# Patient Record
Sex: FEMALE | Race: WHITE | NOT HISPANIC OR LATINO | Employment: OTHER | URBAN - METROPOLITAN AREA
[De-identification: names, ages, dates, MRNs, and addresses within clinical notes are randomized per-mention and may not be internally consistent; named-entity substitution may affect disease eponyms.]

---

## 2017-02-06 ENCOUNTER — APPOINTMENT (OUTPATIENT)
Dept: LAB | Facility: HOSPITAL | Age: 72
End: 2017-02-06

## 2017-02-06 ENCOUNTER — OFFICE VISIT (OUTPATIENT)
Dept: INTERNAL MEDICINE | Facility: CLINIC | Age: 72
End: 2017-02-06

## 2017-02-06 VITALS
WEIGHT: 194 LBS | SYSTOLIC BLOOD PRESSURE: 134 MMHG | HEIGHT: 65 IN | HEART RATE: 68 BPM | BODY MASS INDEX: 32.32 KG/M2 | RESPIRATION RATE: 16 BRPM | DIASTOLIC BLOOD PRESSURE: 70 MMHG | TEMPERATURE: 97.3 F | OXYGEN SATURATION: 97 %

## 2017-02-06 DIAGNOSIS — E78.49 OTHER HYPERLIPIDEMIA: Primary | ICD-10-CM

## 2017-02-06 DIAGNOSIS — Z00.00 PREVENTATIVE HEALTH CARE: ICD-10-CM

## 2017-02-06 DIAGNOSIS — E66.9 NON MORBID OBESITY, UNSPECIFIED OBESITY TYPE: ICD-10-CM

## 2017-02-06 DIAGNOSIS — M15.8 OTHER OSTEOARTHRITIS INVOLVING MULTIPLE JOINTS: ICD-10-CM

## 2017-02-06 DIAGNOSIS — R53.83 FATIGUE, UNSPECIFIED TYPE: ICD-10-CM

## 2017-02-06 DIAGNOSIS — E55.9 VITAMIN D DEFICIENCY: ICD-10-CM

## 2017-02-06 DIAGNOSIS — Z82.49 FAMILY HISTORY OF HEART DISEASE: ICD-10-CM

## 2017-02-06 PROBLEM — C54.9 MALIGNANT NEOPLASM OF BODY OF UTERUS (HCC): Status: ACTIVE | Noted: 2017-02-06

## 2017-02-06 PROBLEM — M15.9 OSTEOARTHRITIS OF MULTIPLE JOINTS: Status: ACTIVE | Noted: 2017-02-06

## 2017-02-06 PROBLEM — E78.5 HYPERLIPIDEMIA: Status: ACTIVE | Noted: 2017-02-06

## 2017-02-06 LAB
25(OH)D3 SERPL-MCNC: 14.3 NG/ML
ALBUMIN SERPL-MCNC: 4.6 G/DL (ref 3.2–4.8)
ALBUMIN/GLOB SERPL: 1.5 G/DL (ref 1.5–2.5)
ALP SERPL-CCNC: 106 U/L (ref 25–100)
ALT SERPL W P-5'-P-CCNC: 16 U/L (ref 7–40)
ANION GAP SERPL CALCULATED.3IONS-SCNC: 8 MMOL/L (ref 3–11)
ARTICHOKE IGE QN: 184 MG/DL (ref 0–130)
AST SERPL-CCNC: 25 U/L (ref 0–33)
BACTERIA UR QL AUTO: ABNORMAL /HPF
BASOPHILS # BLD AUTO: 0.02 10*3/MM3 (ref 0–0.2)
BASOPHILS NFR BLD AUTO: 0.3 % (ref 0–1)
BILIRUB SERPL-MCNC: 0.6 MG/DL (ref 0.3–1.2)
BILIRUB UR QL STRIP: ABNORMAL
BUN BLD-MCNC: 17 MG/DL (ref 9–23)
BUN/CREAT SERPL: 24.3 (ref 7–25)
CALCIUM SPEC-SCNC: 9.9 MG/DL (ref 8.7–10.4)
CHLORIDE SERPL-SCNC: 105 MMOL/L (ref 99–109)
CHOLEST SERPL-MCNC: 275 MG/DL (ref 0–200)
CLARITY UR: ABNORMAL
CO2 SERPL-SCNC: 29 MMOL/L (ref 20–31)
COLOR UR: YELLOW
CREAT BLD-MCNC: 0.7 MG/DL (ref 0.6–1.3)
CRP SERPL-MCNC: 2.3 MG/DL (ref 0–10)
DEPRECATED RDW RBC AUTO: 45.8 FL (ref 37–54)
EOSINOPHIL # BLD AUTO: 0.05 10*3/MM3 (ref 0.1–0.3)
EOSINOPHIL NFR BLD AUTO: 0.7 % (ref 0–3)
ERYTHROCYTE [DISTWIDTH] IN BLOOD BY AUTOMATED COUNT: 12.8 % (ref 11.3–14.5)
GFR SERPL CREATININE-BSD FRML MDRD: 82 ML/MIN/1.73
GLOBULIN UR ELPH-MCNC: 3.1 GM/DL
GLUCOSE BLD-MCNC: 82 MG/DL (ref 70–100)
GLUCOSE UR STRIP-MCNC: NEGATIVE MG/DL
HCT VFR BLD AUTO: 42.9 % (ref 34.5–44)
HDLC SERPL-MCNC: 67 MG/DL (ref 40–60)
HGB BLD-MCNC: 13.7 G/DL (ref 11.5–15.5)
HGB UR QL STRIP.AUTO: NEGATIVE
HYALINE CASTS UR QL AUTO: ABNORMAL /LPF
IMM GRANULOCYTES # BLD: 0.01 10*3/MM3 (ref 0–0.03)
IMM GRANULOCYTES NFR BLD: 0.1 % (ref 0–0.6)
KETONES UR QL STRIP: NEGATIVE
LEUKOCYTE ESTERASE UR QL STRIP.AUTO: ABNORMAL
LYMPHOCYTES # BLD AUTO: 2.05 10*3/MM3 (ref 0.6–4.8)
LYMPHOCYTES NFR BLD AUTO: 29.9 % (ref 24–44)
MCH RBC QN AUTO: 31.4 PG (ref 27–31)
MCHC RBC AUTO-ENTMCNC: 31.9 G/DL (ref 32–36)
MCV RBC AUTO: 98.2 FL (ref 80–99)
MONOCYTES # BLD AUTO: 0.48 10*3/MM3 (ref 0–1)
MONOCYTES NFR BLD AUTO: 7 % (ref 0–12)
NEUTROPHILS # BLD AUTO: 4.24 10*3/MM3 (ref 1.5–8.3)
NEUTROPHILS NFR BLD AUTO: 62 % (ref 41–71)
NITRITE UR QL STRIP: NEGATIVE
PH UR STRIP.AUTO: <=5 [PH] (ref 5–8)
PLATELET # BLD AUTO: 246 10*3/MM3 (ref 150–450)
PMV BLD AUTO: 10.4 FL (ref 6–12)
POTASSIUM BLD-SCNC: 4 MMOL/L (ref 3.5–5.5)
PROT SERPL-MCNC: 7.7 G/DL (ref 5.7–8.2)
PROT UR QL STRIP: NEGATIVE
RBC # BLD AUTO: 4.37 10*6/MM3 (ref 3.89–5.14)
RBC # UR: ABNORMAL /HPF
REF LAB TEST METHOD: ABNORMAL
SODIUM BLD-SCNC: 142 MMOL/L (ref 132–146)
SP GR UR STRIP: 1.02 (ref 1–1.03)
SQUAMOUS #/AREA URNS HPF: ABNORMAL /HPF
TRIGL SERPL-MCNC: 108 MG/DL (ref 0–150)
TSH SERPL DL<=0.05 MIU/L-ACNC: 1.04 MIU/ML (ref 0.35–5.35)
UROBILINOGEN UR QL STRIP: ABNORMAL
VIT B12 BLD-MCNC: 226 PG/ML (ref 211–911)
WBC NRBC COR # BLD: 6.85 10*3/MM3 (ref 3.5–10.8)
WBC UR QL AUTO: ABNORMAL /HPF

## 2017-02-06 PROCEDURE — 99204 OFFICE O/P NEW MOD 45 MIN: CPT | Performed by: NURSE PRACTITIONER

## 2017-02-06 PROCEDURE — 85025 COMPLETE CBC W/AUTO DIFF WBC: CPT | Performed by: NURSE PRACTITIONER

## 2017-02-06 PROCEDURE — 93000 ELECTROCARDIOGRAM COMPLETE: CPT | Performed by: INTERNAL MEDICINE

## 2017-02-06 PROCEDURE — 84443 ASSAY THYROID STIM HORMONE: CPT | Performed by: NURSE PRACTITIONER

## 2017-02-06 PROCEDURE — 36415 COLL VENOUS BLD VENIPUNCTURE: CPT | Performed by: NURSE PRACTITIONER

## 2017-02-06 PROCEDURE — 82607 VITAMIN B-12: CPT | Performed by: NURSE PRACTITIONER

## 2017-02-06 PROCEDURE — 80053 COMPREHEN METABOLIC PANEL: CPT | Performed by: NURSE PRACTITIONER

## 2017-02-06 PROCEDURE — 86140 C-REACTIVE PROTEIN: CPT | Performed by: NURSE PRACTITIONER

## 2017-02-06 PROCEDURE — 82306 VITAMIN D 25 HYDROXY: CPT | Performed by: NURSE PRACTITIONER

## 2017-02-06 PROCEDURE — 81001 URINALYSIS AUTO W/SCOPE: CPT | Performed by: NURSE PRACTITIONER

## 2017-02-06 PROCEDURE — 80061 LIPID PANEL: CPT | Performed by: NURSE PRACTITIONER

## 2017-02-06 RX ORDER — ACETAMINOPHEN 500 MG
500 TABLET ORAL EVERY 6 HOURS PRN
COMMUNITY
End: 2020-07-06

## 2017-02-06 RX ORDER — MULTIVIT WITH CALCIUM,IRON,MIN
1 TABLET ORAL DAILY
Qty: 30 TABLET | Refills: 11
Start: 2017-02-06 | End: 2017-11-13

## 2017-02-06 NOTE — PROGRESS NOTES
Subjective   Bushra Vaz is a 71 y.o. female.     Chief Complaint   Patient presents with   • Hyperlipidemia       History of Present Illness     Here to establish care. Last PCP Dr. Hunter in [place]    1. Uterine Cancer: Pt was diagnosed with uterine cancer in 2010. She has TRINA, BSO and appendectomy done in 2010, followed by 6 chemotherapy treatments. At 5 years post treatment, she was discharged by her oncologist. Her last pelvic was done about one year ago.    2. Hyperlipidemia: Pt has history of elevated cholesterol levels, but is not aware of the readings. She was treated with Atorvastatin last year. After 10 days of treatment, she stopped taking the Atorvastatin due to severe joint pain. She has no known CAD, but her father who was a non-smoker dies of MI at age 79.     3. Osteoarthritis: Pt has several year history of joint pain. She had bilateral knee replacement surgeries done in 2012 due to severe osteoarthritis in her knees. Currently she has mild left hip and left shoulder pains occasionally, which have not yet limited her daily functioning. She takes a Tylenol on occasion for pain management, especially when she plans to be more active.     4. Obesity: Pt's current BMI is 32. She reports no significant weight gain or loss for many years.     The following portions of the patient's history were reviewed and updated as appropriate: allergies, current medications, past family history, past medical history, past social history, past surgical history and problem list.    Active Ambulatory Problems     Diagnosis Date Noted   • Hyperlipidemia 02/06/2017   • Malignant neoplasm of body of uterus 02/06/2017   • Non morbid obesity 02/06/2017   • Osteoarthritis of multiple joints 02/06/2017   • Preventative health care 02/06/2017   • Fatigue 02/06/2017   • Vitamin D deficiency 02/06/2017     Resolved Ambulatory Problems     Diagnosis Date Noted   • No Resolved Ambulatory Problems     Past Medical History    Diagnosis Date   • Osteoarthritis    • Uterine cancer      Past Surgical History   Procedure Laterality Date   • Total abdominal hysterectomy with salpingo oophorectomy  2010     uterine cancer; 6 chemotherapy treatments   • Replacement total knee bilateral Bilateral 2012   • Cataract extraction, bilateral  2002   • Appendectomy  2010     during hysterectomy     Family History   Problem Relation Age of Onset   • Lymphoma Mother      non-hodgkins   • Heart attack Father    • Colon cancer Sister 55   • Stomach cancer Brother 78     non-smoker   • Diabetes Sister      diet controlled   • Breast cancer Other    • Breast cancer Other    • Stroke Neg Hx    • Thyroid disease Neg Hx      Social History     Social History   • Marital status:      Spouse name: N/A   • Number of children: N/A   • Years of education: N/A     Occupational History   • Retired      Social History Main Topics   • Smoking status: Former Smoker   • Smokeless tobacco: Not on file      Comment: smoked socially as young adult   • Alcohol use Yes      Comment: 2-3 drinks weekly   • Drug use: No   • Sexual activity: Not on file     Other Topics Concern   • Not on file     Social History Narrative    Domestic life : Lives with         Religious : Baptism        Sleep hygiene : 7-8 hours/night        Caffeine use : 1 serving of diet coke or tea daily        Exercise habits : Walks 30 minutes daily        Diet : Well-balanced, but loves sweets        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   HENT: Negative for congestion, ear pain, sinus pressure, sore throat and trouble swallowing.    Eyes: Negative for itching and visual disturbance.   Respiratory: Negative for cough and shortness of breath.    Cardiovascular: Negative  "for chest pain, palpitations and leg swelling.   Gastrointestinal: Negative for abdominal pain, blood in stool, constipation, diarrhea and nausea.   Endocrine: Negative for cold intolerance and heat intolerance.   Genitourinary: Negative for dysuria, hematuria, pelvic pain, vaginal bleeding and vaginal discharge.   Musculoskeletal: Positive for arthralgias (left hip and left shoulder). Negative for back pain, gait problem, myalgias and neck pain.   Skin: Negative for rash and wound.   Allergic/Immunologic: Negative for environmental allergies and food allergies.   Neurological: Negative for dizziness, seizures, syncope and headaches.   Hematological: Negative for adenopathy. Does not bruise/bleed easily.   Psychiatric/Behavioral: Positive for sleep disturbance (mild disturbance - wakes frequently). The patient is not nervous/anxious.        Objective   Blood pressure 134/70, pulse 68, temperature 97.3 °F (36.3 °C), temperature source Oral, resp. rate 16, height 65\" (165.1 cm), weight 194 lb (88 kg), SpO2 97 %.    Physical Exam   Constitutional: She is oriented to person, place, and time. She appears well-developed and well-nourished.   HENT:   Head: Normocephalic.   Right Ear: Tympanic membrane and ear canal normal.   Left Ear: Tympanic membrane and ear canal normal.   Nose: No mucosal edema or nasal deformity.   Mouth/Throat: Uvula is midline, oropharynx is clear and moist and mucous membranes are normal.   Eyes: Conjunctivae, EOM and lids are normal. Pupils are equal, round, and reactive to light.   Neck: Trachea normal and full passive range of motion without pain. Neck supple. Carotid bruit is not present. No thyroid mass and no thyromegaly present.   Cardiovascular: Normal rate, regular rhythm and normal heart sounds.    No murmur heard.  Pulses:       Radial pulses are 2+ on the right side, and 2+ on the left side.        Dorsalis pedis pulses are 2+ on the right side, and 2+ on the left side.        Posterior " tibial pulses are 2+ on the right side, and 2+ on the left side.   No edema   Pulmonary/Chest: Effort normal and breath sounds normal. She exhibits no mass and no tenderness. Right breast exhibits no mass. Left breast exhibits no mass.   Abdominal: Soft. Normal appearance and bowel sounds are normal. There is no hepatosplenomegaly. There is no tenderness.   Genitourinary: No breast tenderness.   Genitourinary Comments: deferred   Musculoskeletal: Normal range of motion. She exhibits no edema or tenderness.   No back pain with palpation   Neurological: She is alert and oriented to person, place, and time. She has normal strength and normal reflexes. No cranial nerve deficit or sensory deficit. She displays a negative Romberg sign.   Skin: Skin is warm and dry.   Psychiatric: She has a normal mood and affect. Her speech is normal and behavior is normal. Thought content normal. Cognition and memory are normal.   pleasant   Vitals reviewed.      ECG 12 Lead  Date/Time: 2/6/2017 3:21 PM  Performed by: ALEX CHERRY  Authorized by: ALEX CHERRY   Interpreted by ED physician: Alex Cherry M.D.  Comparison: not compared with previous ECG   Previous ECG: no previous ECG available  Rhythm: sinus rhythm  Rate: normal  BPM: 60  Conduction: conduction normal  ST Segments: ST segments normal  T Waves: T waves normal  QRS axis: normal  Other: no other findings  Clinical impression: normal ECG  Comments: Indication - family history heart disease    Electronically signed Alex Cherry M.D.2/12/2017 4:22 PM           Assessment/Plan   Bushra was seen today for hyperlipidemia.    Diagnoses and all orders for this visit:    Other hyperlipidemia  -     Lipid Panel  -     Comprehensive Metabolic Panel    Non morbid obesity, unspecified obesity type    Other osteoarthritis involving multiple joints    Fatigue, unspecified type  -     CBC & Differential  -     Urinalysis With Microscopic  -     Vitamin B12  -     TSH  -      C-reactive Protein  -     CBC Auto Differential  -     Urinalysis  -     Urinalysis, Microscopic Only    Preventative health care  -     Multiple Vitamins-Minerals (MULTIPLE VITAMINS/WOMENS) tablet; Take 1 tablet by mouth Daily.  -     aspirin 81 MG tablet; Take 1 tablet by mouth Daily.    Vitamin D deficiency  -     Vitamin D 25 Hydroxy      1. Uterine Cancer: Pt is 6 years post TRINA and BSO and chemotherapy for uterine cancer. She is having no vaginal problems. She was discharged from the oncologist's care last year. She is due for pelvic exam and will schedule this in the next month.     2. Hyperlipidemia: Pt has history of elevated cholesterol with intolerance of Atorvastatin. She has no known CAD, but her father did die at age 79 of MI. Will do LP today, then discuss treatment if needed. Her LDL goal is < 100 and ideally 70. She was counseled about ASA therapy and advised to start ASA 81 mg daily due to family history. Consider starting Co Q 10.     3. Osteoarthritis: Pt has done well with bilateral knee replacement. She has intermittent mild joint pain in her left hip and shoulder, which has not limited her activities to date. She may continue taking Tylenol as needed for pain.     4. Obesity: Pt had BMI of 32. Her overall health would likely benefit from 20# weight loss in the next year. Will discuss referral to dietitian at next visit. She was advised to continue walking at least 30 minutes daily. She was advised to start a daily multiple vitamin.    5. Pt is to obtain records of last colonoscopy, mammogram, DEXA, and vaccines. Will plan Wellness in 1 month. She is to schedule pelvic exam soon.    EKG done today.    TGF present @ this OX.    Patient Instructions   1. Start daily Women's Multiple Vitamin.    2. Start Aspirin 81 mg daily.    3. Obtain records from colonoscopy, mammogram, DEXA, primary care.    4. Vaccine due: Tdap, Pneumonvax, and Shingles.    5. Continue walking 30 minutes daily.    6.  Wellness due in one month. May schedule [facility] if desired.       Current Outpatient Prescriptions:   •  acetaminophen (TYLENOL) 500 MG tablet, Take 500 mg by mouth Every 6 (Six) Hours As Needed for mild pain (1-3) (pain)., Disp: , Rfl:   •  aspirin 81 MG tablet, Take 1 tablet by mouth Daily., Disp: , Rfl:   •  Multiple Vitamins-Minerals (MULTIPLE VITAMINS/WOMENS) tablet, Take 1 tablet by mouth Daily., Disp: 30 tablet, Rfl: 11    Allergies   Allergen Reactions   • Atorvastatin      Joint pains         There is no immunization history on file for this patient.        This visit was 50 minutes with 25 minutes spent reviewing past history and counseling pt on management of lipids, heart disease prevention, weight, exercise, medications.         Electronically signed by BENJI Phan 2/8/2017 7:19 AM

## 2017-02-08 ENCOUNTER — TELEPHONE (OUTPATIENT)
Dept: INTERNAL MEDICINE | Facility: CLINIC | Age: 72
End: 2017-02-08

## 2017-02-08 DIAGNOSIS — E53.8 VITAMIN B12 DEFICIENCY: ICD-10-CM

## 2017-02-08 DIAGNOSIS — E55.9 VITAMIN D DEFICIENCY: ICD-10-CM

## 2017-02-08 DIAGNOSIS — E78.49 OTHER HYPERLIPIDEMIA: Primary | ICD-10-CM

## 2017-02-08 RX ORDER — VITAMIN B COMPLEX
1 TABLET ORAL DAILY
Refills: 0
Start: 2017-02-08 | End: 2017-11-13

## 2017-02-08 RX ORDER — PRAVASTATIN SODIUM 10 MG
10 TABLET ORAL EVERY OTHER DAY
Qty: 30 TABLET | Refills: 0 | Status: SHIPPED | OUTPATIENT
Start: 2017-02-08 | End: 2017-03-20 | Stop reason: SDUPTHER

## 2017-02-08 RX ORDER — UBIDECARENONE 200 MG
200 CAPSULE ORAL DAILY
Start: 2017-02-08 | End: 2017-11-13

## 2017-02-08 NOTE — TELEPHONE ENCOUNTER
Pt notified of labs: , HDL 67, , FBG 82, Vit B12 226, Vit D 14.3, other labs OK. Pt is to start Co Q 10 200 mg BID, Vit D 5000 iu daily, Vit B12 2500 mcg SL daily, and Pravastatin 10 mg QOD. She was counseled about risks of low Vit D and Vit B12 levels. Plan to repeat LP, CMP, and Vit D in 1 month. Pt was requested to contact the office with any apparent side effects from the statin.

## 2017-02-12 PROBLEM — Z82.49 FAMILY HISTORY OF HEART DISEASE: Status: ACTIVE | Noted: 2017-02-12

## 2017-02-23 ENCOUNTER — OFFICE VISIT (OUTPATIENT)
Dept: INTERNAL MEDICINE | Facility: CLINIC | Age: 72
End: 2017-02-23

## 2017-02-23 VITALS
DIASTOLIC BLOOD PRESSURE: 70 MMHG | RESPIRATION RATE: 16 BRPM | OXYGEN SATURATION: 96 % | TEMPERATURE: 99 F | WEIGHT: 196 LBS | BODY MASS INDEX: 32.62 KG/M2 | HEART RATE: 76 BPM | SYSTOLIC BLOOD PRESSURE: 122 MMHG

## 2017-02-23 DIAGNOSIS — Z23 NEED FOR STREPTOCOCCUS PNEUMONIAE VACCINATION: ICD-10-CM

## 2017-02-23 DIAGNOSIS — Z12.31 VISIT FOR SCREENING MAMMOGRAM: ICD-10-CM

## 2017-02-23 DIAGNOSIS — N95.2 VAGINAL ATROPHY: Primary | ICD-10-CM

## 2017-02-23 PROCEDURE — G0009 ADMIN PNEUMOCOCCAL VACCINE: HCPCS | Performed by: NURSE PRACTITIONER

## 2017-02-23 PROCEDURE — 99213 OFFICE O/P EST LOW 20 MIN: CPT | Performed by: NURSE PRACTITIONER

## 2017-02-23 PROCEDURE — 90732 PPSV23 VACC 2 YRS+ SUBQ/IM: CPT | Performed by: NURSE PRACTITIONER

## 2017-02-23 NOTE — PATIENT INSTRUCTIONS
1. Pelvic and Pap today.    2. May use Vagisil or KY jelly for lubrication.    3. Pneumovax given today. Rx for Tdap vaccine given to pt - to have done at pharmacy.    4. Continue Women's daily multiple vitamin, Vitamin D 5000 iu daily, 2-3 serving of dairy daily and walking 30 minutes 5-7 days/week.    5. Referred for mammogram.    6. Plan to repeat DEXA in 2018.     7. Continue current meds.    8. Wellness and fasting labs March 17.

## 2017-02-23 NOTE — PROGRESS NOTES
Subjective   Bushra Vaz is a 71 y.o. female.     History of Present Illness     1. Pt here for pelvic exam. Last pelvic was done about one year ago. Pt has history of uterine cancer with TRINA, BSO and appendectomy done in 2010. She had 6 chemotherapy treatments and was discharged by her oncologist at 5 years post treatment. She denies any pelvic symptoms, other than dryness, currently. Last mammogram was 2/12/16.     2. Osteoporosis. Pt had DEXA done 2/12/16, which showed osteoporosis in L-spine and left hip with T score in each area of -2.5. She has history of taking Boniva for a short time about 5-10 years ago, but developed GI distress. Currently she does not want to take any other meds. She wants to continue taking her Women's multiple vitamin daily, Vitamin D 5000 iu daily. She takes in at least 2 servings of dairy daily and has started a regular walking program walking 30 minutes 5-6 days/week.     The following portions of the patient's history were reviewed and updated as appropriate: allergies, current medications, past family history, past medical history, past social history, past surgical history and problem list.    Review of Systems   Constitutional: Negative for fatigue and fever.   HENT: Negative for congestion and sore throat.    Eyes: Negative for pain and itching.   Respiratory: Negative for cough and shortness of breath.    Cardiovascular: Negative for chest pain, palpitations and leg swelling.   Gastrointestinal: Negative for abdominal pain, blood in stool, constipation, diarrhea and nausea.   Endocrine: Negative for cold intolerance and heat intolerance.   Genitourinary: Negative for dysuria, flank pain, hematuria, pelvic pain, vaginal bleeding, vaginal discharge and vaginal pain.   Musculoskeletal: Negative for arthralgias and back pain.   Skin: Negative for rash and wound.   Allergic/Immunologic: Negative for environmental allergies and food allergies.   Neurological: Negative for  dizziness and headaches.   Hematological: Negative for adenopathy. Does not bruise/bleed easily.   Psychiatric/Behavioral: Negative for sleep disturbance. The patient is not nervous/anxious.        Objective   Blood pressure 122/70, pulse 76, temperature 99 °F (37.2 °C), temperature source Oral, resp. rate 16, weight 196 lb (88.9 kg), SpO2 96 %.    Physical Exam   Constitutional: She is oriented to person, place, and time. She appears well-developed and well-nourished.   Cardiovascular: Normal rate, regular rhythm and normal heart sounds.    No murmur heard.  Pulses:       Radial pulses are 2+ on the right side, and 2+ on the left side.   Pulmonary/Chest: Effort normal and breath sounds normal. She has no wheezes. She exhibits no mass and no tenderness. Right breast exhibits no mass. Left breast exhibits no mass.   Abdominal: Soft. Normal appearance and bowel sounds are normal. There is no hepatosplenomegaly. There is no tenderness. Hernia confirmed negative in the right inguinal area and confirmed negative in the left inguinal area.   Genitourinary: Rectum normal and vagina normal. No breast tenderness. Pelvic exam was performed with patient supine. There is no rash, tenderness or lesion on the right labia. There is no rash, tenderness or lesion on the left labia.   Genitourinary Comments: Uterus, cervix, ovaries surgically absent. Pap test done on vaginal tissue.    Lymphadenopathy:        Right: No inguinal adenopathy present.        Left: No inguinal adenopathy present.   Neurological: She is alert and oriented to person, place, and time.   Skin: Skin is warm and dry.   Psychiatric: She has a normal mood and affect. Her behavior is normal.   Vitals reviewed.    Procedures  Assessment/Plan   Bushra was seen today for gynecologic exam.    Diagnoses and all orders for this visit:    Vaginal atrophy  -     Pap IG, Rfx HPV ASCU    Visit for screening mammogram  -     Mammo Screening Digital Tomosynthesis Bilateral With  CAD; Future    Need for Streptococcus pneumoniae vaccination  -     Pneumococcal Polysaccharide Vaccine 23-Valent Greater Than or Equal To 1yo Subcutaneous / IM    1. Pelvic exam was done today with Pap of vaginal wall. Pt has moderate vaginal dryness. Was counseled she may use Vagisil or KY jelly for lubrication.     2. Osteoporosis: Pt has borderline osteoporosis of L-spine and left hip per DEXA done on 2/12/16; T-score -2.5 both areas. She failed Boniva therapy due to GI distress. Declines any new medications at this time. She is to continue a daily women's multiple vitamin, 2-3 servings of dairy daily, Vitamin D 5000 iu daily, and walking at least 30 minutes 5-7 days/week. Plan to repeat DEXA in 2018.    3. Pt referred for annual mammogram screening. Clinical breast exam done today with no abnormality noted.     4. Pneumovax vaccine was given today. She was given Rx for Tdap vaccine. She declines flu vaccine today, but will plan to get the flu vaccine in the Fall.    Continue current meds.    Wellness scheduled March 17..    Patient Instructions   1. Pelvic and Pap today.    2. May use Vagisil or KY jelly for lubrication.    3. Pneumovax given today. Rx for Tdap vaccine given to pt - to have done at pharmacy.    4. Continue Women's daily multiple vitamin, Vitamin D 5000 iu daily, 2-3 serving of dairy daily and walking 30 minutes 5-7 days/week.    5. Referred for mammogram.    6. Plan to repeat DEXA in 2018.     7. Continue current meds.    8. Wellness and fasting labs March 17.      Electronically signed by BENJI Phan 2/24/2017 7:45 AM

## 2017-02-24 PROBLEM — M81.0 OSTEOPOROSIS: Status: ACTIVE | Noted: 2017-02-24

## 2017-02-27 LAB
CONV .: NORMAL
CYTOLOGIST CVX/VAG CYTO: NORMAL
CYTOLOGY CVX/VAG DOC THIN PREP: NORMAL
DX ICD CODE: NORMAL
HIV 1 & 2 AB SER-IMP: NORMAL
OTHER STN SPEC: NORMAL
PATH REPORT.FINAL DX SPEC: NORMAL
STAT OF ADQ CVX/VAG CYTO-IMP: NORMAL

## 2017-02-28 ENCOUNTER — TELEPHONE (OUTPATIENT)
Dept: INTERNAL MEDICINE | Facility: CLINIC | Age: 72
End: 2017-02-28

## 2017-03-16 ENCOUNTER — TELEPHONE (OUTPATIENT)
Dept: INTERNAL MEDICINE | Facility: CLINIC | Age: 72
End: 2017-03-16

## 2017-03-16 DIAGNOSIS — E55.9 VITAMIN D DEFICIENCY: ICD-10-CM

## 2017-03-16 DIAGNOSIS — E78.49 OTHER HYPERLIPIDEMIA: Primary | ICD-10-CM

## 2017-03-16 NOTE — TELEPHONE ENCOUNTER
Pt called requesting to have labs done prior to her visit at 10AM tomorrow. Order for LP, CMP, and Vit D placed.

## 2017-03-17 ENCOUNTER — OFFICE VISIT (OUTPATIENT)
Dept: INTERNAL MEDICINE | Facility: CLINIC | Age: 72
End: 2017-03-17

## 2017-03-17 ENCOUNTER — LAB (OUTPATIENT)
Dept: LAB | Facility: HOSPITAL | Age: 72
End: 2017-03-17

## 2017-03-17 VITALS
OXYGEN SATURATION: 98 % | WEIGHT: 196 LBS | TEMPERATURE: 98.4 F | HEART RATE: 68 BPM | DIASTOLIC BLOOD PRESSURE: 70 MMHG | BODY MASS INDEX: 32.65 KG/M2 | RESPIRATION RATE: 16 BRPM | HEIGHT: 65 IN | SYSTOLIC BLOOD PRESSURE: 138 MMHG

## 2017-03-17 DIAGNOSIS — K59.01 SLOW TRANSIT CONSTIPATION: Primary | ICD-10-CM

## 2017-03-17 DIAGNOSIS — E55.9 VITAMIN D DEFICIENCY: ICD-10-CM

## 2017-03-17 DIAGNOSIS — E78.49 OTHER HYPERLIPIDEMIA: ICD-10-CM

## 2017-03-17 DIAGNOSIS — Z00.00 PREVENTATIVE HEALTH CARE: ICD-10-CM

## 2017-03-17 LAB
25(OH)D3 SERPL-MCNC: 25.1 NG/ML
ALBUMIN SERPL-MCNC: 4.1 G/DL (ref 3.2–4.8)
ALBUMIN/GLOB SERPL: 1.3 G/DL (ref 1.5–2.5)
ALP SERPL-CCNC: 113 U/L (ref 25–100)
ALT SERPL W P-5'-P-CCNC: 14 U/L (ref 7–40)
ANION GAP SERPL CALCULATED.3IONS-SCNC: 1 MMOL/L (ref 3–11)
ARTICHOKE IGE QN: 159 MG/DL (ref 0–130)
AST SERPL-CCNC: 21 U/L (ref 0–33)
BILIRUB SERPL-MCNC: 0.5 MG/DL (ref 0.3–1.2)
BUN BLD-MCNC: 17 MG/DL (ref 9–23)
BUN/CREAT SERPL: 28.3 (ref 7–25)
CALCIUM SPEC-SCNC: 9.5 MG/DL (ref 8.7–10.4)
CHLORIDE SERPL-SCNC: 108 MMOL/L (ref 99–109)
CHOLEST SERPL-MCNC: 226 MG/DL (ref 0–200)
CO2 SERPL-SCNC: 32 MMOL/L (ref 20–31)
CREAT BLD-MCNC: 0.6 MG/DL (ref 0.6–1.3)
GFR SERPL CREATININE-BSD FRML MDRD: 98 ML/MIN/1.73
GLOBULIN UR ELPH-MCNC: 3.1 GM/DL
GLUCOSE BLD-MCNC: 92 MG/DL (ref 70–100)
HDLC SERPL-MCNC: 63 MG/DL (ref 40–60)
POTASSIUM BLD-SCNC: 4.1 MMOL/L (ref 3.5–5.5)
PROT SERPL-MCNC: 7.2 G/DL (ref 5.7–8.2)
SODIUM BLD-SCNC: 141 MMOL/L (ref 132–146)
TRIGL SERPL-MCNC: 110 MG/DL (ref 0–150)

## 2017-03-17 PROCEDURE — G0438 PPPS, INITIAL VISIT: HCPCS | Performed by: NURSE PRACTITIONER

## 2017-03-17 PROCEDURE — 80053 COMPREHEN METABOLIC PANEL: CPT | Performed by: NURSE PRACTITIONER

## 2017-03-17 PROCEDURE — 80061 LIPID PANEL: CPT | Performed by: NURSE PRACTITIONER

## 2017-03-17 PROCEDURE — 82306 VITAMIN D 25 HYDROXY: CPT | Performed by: NURSE PRACTITIONER

## 2017-03-17 PROCEDURE — 36415 COLL VENOUS BLD VENIPUNCTURE: CPT

## 2017-03-17 RX ORDER — CALCIUM POLYCARBOPHIL 625 MG 625 MG/1
625 TABLET ORAL DAILY
Start: 2017-03-17 | End: 2017-06-21 | Stop reason: SDDI

## 2017-03-17 NOTE — PROGRESS NOTES
QUICK REFERENCE INFORMATION:  The ABCs of the Annual Wellness Visit    Initial Medicare Wellness Visit    HEALTH RISK ASSESSMENT    1945    Recent Hospitalizations:  No recent hospitalization(s)..        Current Medical Providers:  Patient Care Team:  BENJI Cornejo as PCP - General (Internal Medicine)        Smoking Status:  History   Smoking Status   • Former Smoker   Smokeless Tobacco   • Not on file     Comment: smoked socially as young adult       Alcohol Consumption:  History   Alcohol Use   • Yes     Comment: 2-3 drinks weekly       Depression Screen:   No flowsheet data found.    Health Habits and Functional and Cognitive Screening:  No flowsheet data found.               Does the patient have evidence of cognitive impairment? No    Asprin use counseling:not applicable      Recent Lab Results:    Visual Acuity:  No exam data present    Age-appropriate Screening Schedule:  Refer to the list below for future screening recommendations based on patient's age, sex and/or medical conditions. Orders for these recommended tests are listed in the plan section. The patient has been provided with a written plan.    Health Maintenance   Topic Date Due   • TDAP/TD VACCINES (1 - Tdap) 09/29/1962   • INFLUENZA VACCINE  08/01/2016   • COLONOSCOPY  02/06/2017   • ZOSTER VACCINE  02/06/2017   • LIPID PANEL  02/06/2018   • MAMMOGRAM  02/12/2018   • DXA SCAN  02/12/2018   • PNEUMOCOCCAL VACCINES (65+ LOW/MEDIUM RISK) (2 of 2 - PCV13) 02/23/2018        Subjective   History of Present Illness    Bushra Vaz is a 71 y.o. female who presents for an Annual Wellness Visit.    The following portions of the patient's history were reviewed and updated as appropriate: allergies, current medications, past family history, past medical history, past social history, past surgical history and problem list.    Outpatient Medications Prior to Visit   Medication Sig Dispense Refill   • acetaminophen (TYLENOL) 500 MG tablet  Take 500 mg by mouth Every 6 (Six) Hours As Needed for mild pain (1-3) (pain).     • aspirin 81 MG tablet Take 1 tablet by mouth Daily.     • Cholecalciferol (VITAMIN D3) 5000 UNITS tablet Take 1 tablet by mouth Daily. 30 tablet    • Coenzyme Q10 200 MG capsule Take 200 mg by mouth Daily.     • Cyanocobalamin 2500 MCG sublingual tablet Place 1 tablet under the tongue Daily.  0   • Multiple Vitamins-Minerals (MULTIPLE VITAMINS/WOMENS) tablet Take 1 tablet by mouth Daily. 30 tablet 11   • pravastatin (PRAVACHOL) 10 MG tablet Take 1 tablet by mouth Every Other Day. 30 tablet 0     No facility-administered medications prior to visit.        Patient Active Problem List   Diagnosis   • Hyperlipidemia   • Malignant neoplasm of body of uterus   • Non morbid obesity   • Osteoarthritis of multiple joints   • Preventative health care   • Fatigue   • Vitamin D deficiency   • Vitamin B12 deficiency   • Family history of heart disease   • Vaginal atrophy   • Osteoporosis       Advanced Care Planning:  has NO advanced directive - information provided to the patient today    Identification of Risk Factors:  Risk factors include: weight , cardiovascular risk and increased fall risk.    Review of Systems   Constitutional: Negative for fatigue and fever.   HENT: Negative for congestion and sore throat.    Eyes: Negative for pain and itching.   Respiratory: Negative for cough and shortness of breath.    Cardiovascular: Negative for chest pain and palpitations.   Gastrointestinal: Negative for abdominal pain and nausea.   Endocrine: Negative for cold intolerance and heat intolerance.   Genitourinary: Negative for dysuria and hematuria.   Musculoskeletal: Positive for arthralgias (mild bilateral knee pain). Negative for back pain.   Skin: Negative for rash and wound.   Allergic/Immunologic: Negative for environmental allergies and food allergies.   Neurological: Negative for dizziness and headaches.   Hematological: Negative for  "adenopathy. Does not bruise/bleed easily.   Psychiatric/Behavioral: Negative for sleep disturbance. The patient is not nervous/anxious.        Compared to one year ago, the patient feels her physical health is the same.  Compared to one year ago, the patient feels her mental health is the same.    Objective     Physical Exam   Constitutional: She is oriented to person, place, and time. She appears well-developed and well-nourished.   Cardiovascular: Normal rate and regular rhythm.    Pulmonary/Chest: Effort normal.   Neurological: She is alert and oriented to person, place, and time.   Skin: Skin is warm and dry.   Psychiatric: She has a normal mood and affect. Her behavior is normal.   Vitals reviewed.      Vitals:    03/17/17 0957   BP: 138/70   BP Location: Right arm   Patient Position: Sitting   Pulse: 68   Resp: 16   Temp: 98.4 °F (36.9 °C)   TempSrc: Oral   SpO2: 98%   Weight: 196 lb (88.9 kg)   Height: 65\" (165.1 cm)       Body mass index is 32.62 kg/(m^2).  Discussed the patient's BMI with her. The BMI is above average; BMI management plan is completed.    Assessment/Plan   Patient Self-Management and Personalized Health Advice  The patient has been provided with information about: diet, exercise and weight management and preventive services including:   · Colorectal cancer screening, fecal occult blood test, Exercise counseling provided, Fall Risk assessment done, Glaucoma screening recommended, TdaP vaccine, Zostavax vaccine (Herpes Zoster).    Visit Diagnoses:  No diagnosis found.    No orders of the defined types were placed in this encounter.      Outpatient Encounter Prescriptions as of 3/17/2017   Medication Sig Dispense Refill   • acetaminophen (TYLENOL) 500 MG tablet Take 500 mg by mouth Every 6 (Six) Hours As Needed for mild pain (1-3) (pain).     • aspirin 81 MG tablet Take 1 tablet by mouth Daily.     • Cholecalciferol (VITAMIN D3) 5000 UNITS tablet Take 1 tablet by mouth Daily. 30 tablet    • " Coenzyme Q10 200 MG capsule Take 200 mg by mouth Daily.     • Cyanocobalamin 2500 MCG sublingual tablet Place 1 tablet under the tongue Daily.  0   • Multiple Vitamins-Minerals (MULTIPLE VITAMINS/WOMENS) tablet Take 1 tablet by mouth Daily. 30 tablet 11   • pravastatin (PRAVACHOL) 10 MG tablet Take 1 tablet by mouth Every Other Day. 30 tablet 0     No facility-administered encounter medications on file as of 3/17/2017.        Reviewed use of high risk medication in the elderly: not applicable  Reviewed for potential of harmful drug interactions in the elderly: not applicable    Follow Up:  No Follow-up on file.     An After Visit Summary and PPPS with all of these plans were given to the patient.       The wellness exam has been reviewed in detail.  The patient has been fully counseled on preventative guidelines for vaccines, cancer screenings, and other health maintenance needs.  Functional testing has been performed to assess capacity for independent living and need for other medical interventions.   The patient was counseled on maintaining a lifestyle to promote good health and to minimize chronic diseases.  The patient has been assisted with scheduling healthcare procedures for the coming year and given a written document outlining these recommendations.    Wellness forms scanned into EHR.    Electronically signed by BENJI Phan 3/17/2017 11:36 AM

## 2017-03-17 NOTE — PATIENT INSTRUCTIONS
1. Wellness done today.    2. Start FiberCon daily for constipation. Drink plenty of fluids. Continue daily walking. Take Miralax as needed for constipation.    3. Tdap vaccine due.    4. Rx for Shingles vaccine given to pt.    5. Eye exam and dental exam due.    6. Hemoccult due in 2-3 weeks.    7. Mammogram scheduled for March 21.    8. Will call pt with lab results done today.    9. Continue current meds.    10. Fasting exam in 3 months.

## 2017-03-20 ENCOUNTER — TELEPHONE (OUTPATIENT)
Dept: INTERNAL MEDICINE | Facility: CLINIC | Age: 72
End: 2017-03-20

## 2017-03-20 ENCOUNTER — HOSPITAL ENCOUNTER (OUTPATIENT)
Dept: MAMMOGRAPHY | Facility: HOSPITAL | Age: 72
Discharge: HOME | End: 2017-03-20
Admitting: NURSE PRACTITIONER

## 2017-03-20 ENCOUNTER — APPOINTMENT (OUTPATIENT)
Dept: OTHER | Facility: HOSPITAL | Age: 72
End: 2017-03-20

## 2017-03-20 DIAGNOSIS — E78.49 OTHER HYPERLIPIDEMIA: ICD-10-CM

## 2017-03-20 DIAGNOSIS — Z92.89 H/O MAMMOGRAM: ICD-10-CM

## 2017-03-20 DIAGNOSIS — Z12.31 VISIT FOR SCREENING MAMMOGRAM: ICD-10-CM

## 2017-03-20 DIAGNOSIS — E55.9 VITAMIN D DEFICIENCY: Primary | ICD-10-CM

## 2017-03-20 PROCEDURE — 77063 BREAST TOMOSYNTHESIS BI: CPT | Performed by: RADIOLOGY

## 2017-03-20 PROCEDURE — G0202 SCR MAMMO BI INCL CAD: HCPCS

## 2017-03-20 PROCEDURE — G0202 SCR MAMMO BI INCL CAD: HCPCS | Performed by: RADIOLOGY

## 2017-03-20 PROCEDURE — 77063 BREAST TOMOSYNTHESIS BI: CPT

## 2017-03-20 RX ORDER — ERGOCALCIFEROL 1.25 MG/1
50000 CAPSULE ORAL WEEKLY
Qty: 12 CAPSULE | Refills: 0 | Status: SHIPPED | OUTPATIENT
Start: 2017-03-20 | End: 2017-06-21

## 2017-03-20 RX ORDER — PRAVASTATIN SODIUM 20 MG
20 TABLET ORAL EVERY OTHER DAY
Qty: 45 TABLET | Refills: 0 | Status: SHIPPED | OUTPATIENT
Start: 2017-03-20 | End: 2017-06-21 | Stop reason: SINTOL

## 2017-03-20 NOTE — TELEPHONE ENCOUNTER
Pt notified of labs:  - down from 184 1 month ago - after taking Pravastatin 10 mg QOD; Vitamin D 25 - up from 15 after one month of Vit D 5000 iu daily. Pt was advised to switch to Simvastatin, but wants to stay on Pravastatin as she is tolerating it well. Will increase Pravastatin to 20 mg QOD, she should take Vit D 50,000 iu weekly for 12 weeks, and continue Vit D 5000 iu daily.  Plan to repeat LP, CMP & Vit D in 3 months.

## 2017-03-29 ENCOUNTER — TELEPHONE (OUTPATIENT)
Dept: INTERNAL MEDICINE | Facility: CLINIC | Age: 72
End: 2017-03-29

## 2017-06-21 ENCOUNTER — APPOINTMENT (OUTPATIENT)
Dept: LAB | Facility: HOSPITAL | Age: 72
End: 2017-06-21

## 2017-06-21 ENCOUNTER — OFFICE VISIT (OUTPATIENT)
Dept: INTERNAL MEDICINE | Facility: CLINIC | Age: 72
End: 2017-06-21

## 2017-06-21 VITALS
RESPIRATION RATE: 16 BRPM | DIASTOLIC BLOOD PRESSURE: 76 MMHG | OXYGEN SATURATION: 97 % | BODY MASS INDEX: 32.28 KG/M2 | WEIGHT: 194 LBS | SYSTOLIC BLOOD PRESSURE: 130 MMHG | TEMPERATURE: 98.1 F | HEART RATE: 68 BPM

## 2017-06-21 DIAGNOSIS — E78.49 OTHER HYPERLIPIDEMIA: Primary | ICD-10-CM

## 2017-06-21 DIAGNOSIS — E53.8 VITAMIN B12 DEFICIENCY: ICD-10-CM

## 2017-06-21 DIAGNOSIS — E55.9 VITAMIN D DEFICIENCY: ICD-10-CM

## 2017-06-21 LAB
25(OH)D3 SERPL-MCNC: 41.5 NG/ML
ALBUMIN SERPL-MCNC: 4.4 G/DL (ref 3.2–4.8)
ALBUMIN/GLOB SERPL: 1.4 G/DL (ref 1.5–2.5)
ALP SERPL-CCNC: 103 U/L (ref 25–100)
ALT SERPL W P-5'-P-CCNC: 16 U/L (ref 7–40)
ANION GAP SERPL CALCULATED.3IONS-SCNC: 8 MMOL/L (ref 3–11)
ARTICHOKE IGE QN: 180 MG/DL (ref 0–130)
AST SERPL-CCNC: 24 U/L (ref 0–33)
BILIRUB SERPL-MCNC: 0.6 MG/DL (ref 0.3–1.2)
BUN BLD-MCNC: 18 MG/DL (ref 9–23)
BUN/CREAT SERPL: 30 (ref 7–25)
CALCIUM SPEC-SCNC: 9.9 MG/DL (ref 8.7–10.4)
CHLORIDE SERPL-SCNC: 109 MMOL/L (ref 99–109)
CHOLEST SERPL-MCNC: 257 MG/DL (ref 0–200)
CK SERPL-CCNC: 73 U/L (ref 26–174)
CO2 SERPL-SCNC: 26 MMOL/L (ref 20–31)
CREAT BLD-MCNC: 0.6 MG/DL (ref 0.6–1.3)
GFR SERPL CREATININE-BSD FRML MDRD: 98 ML/MIN/1.73
GLOBULIN UR ELPH-MCNC: 3.1 GM/DL
GLUCOSE BLD-MCNC: 96 MG/DL (ref 70–100)
HDLC SERPL-MCNC: 60 MG/DL (ref 40–60)
POTASSIUM BLD-SCNC: 4.4 MMOL/L (ref 3.5–5.5)
PROT SERPL-MCNC: 7.5 G/DL (ref 5.7–8.2)
SODIUM BLD-SCNC: 143 MMOL/L (ref 132–146)
TRIGL SERPL-MCNC: 113 MG/DL (ref 0–150)
VIT B12 BLD-MCNC: 307 PG/ML (ref 211–911)

## 2017-06-21 PROCEDURE — 82550 ASSAY OF CK (CPK): CPT | Performed by: NURSE PRACTITIONER

## 2017-06-21 PROCEDURE — 82306 VITAMIN D 25 HYDROXY: CPT | Performed by: NURSE PRACTITIONER

## 2017-06-21 PROCEDURE — 36415 COLL VENOUS BLD VENIPUNCTURE: CPT | Performed by: NURSE PRACTITIONER

## 2017-06-21 PROCEDURE — 99214 OFFICE O/P EST MOD 30 MIN: CPT | Performed by: NURSE PRACTITIONER

## 2017-06-21 PROCEDURE — 82607 VITAMIN B-12: CPT | Performed by: NURSE PRACTITIONER

## 2017-06-21 PROCEDURE — 80053 COMPREHEN METABOLIC PANEL: CPT | Performed by: NURSE PRACTITIONER

## 2017-06-21 PROCEDURE — 80061 LIPID PANEL: CPT | Performed by: NURSE PRACTITIONER

## 2017-06-21 NOTE — PATIENT INSTRUCTIONS
1. Labs today to check cholesterol, Vitamin D, and Vitamin B12.    2. Goal LDL < 100 and ideally 70 in light of father's history of heart disease. Resume Co Q 10 200 mg daily.     3. Tdap vaccine and Shingles (Zoster) vaccine due - to be done at pharmacy.    4. Walk at least 30 minutes daily.     5. Continue current meds.    6. Fasting f/u in 3 months.

## 2017-06-21 NOTE — PROGRESS NOTES
Subjective   Bushra Vaz is a 71 y.o. female.     History of Present Illness     1. Hyperlipidemia: Pt has history of elevated LDL of 184. After one month of Pravastatin 10 mg QOD, her level dropped to 159. She was advised to switch to Simvastatin at that time, but asked to stay on Pravastatin as she was tolerating it well. Her dose was increased to Pravastatin 20 mg QOD in March. Today she reports she developed bilateral ankle pain, which improved after she experimented with holding the Pravastatin. Ankle pain resolved each time she help the Pravastatin. She ultimately stopped taking Pravastatin about 2 months ago, with no ankle pain since. She has not been taking Co Q 10.     2. Vitamin D Deficiency: Pt had severely low Vitamin D level of 14 in February. She was prescribed Ergocalciferol 50,000 iu weekly for 12 weeks, in addition to Vitamin D 5000 iu daily. Today she reports she completed the weekly Vitamin D, but has been taking Vitamin D 5000 iu only 2-3 times/week.    3. Vitamin B12 Deficiency: Pt's Vitamin B12 level was 226 in February, at which time she was instructed to start Vitamin B12 SL 2500 mcg daily. She has been taking Vitamin B12 2500 mcg orally ( not SL) almost daily for the past 3 months.     4. Overweight: Pt's current BMI is 32.6. She has not been consistent with an exercise program this Spring. She eats a well-balanced diet, but admits to eating too many sweets.     The following portions of the patient's history were reviewed and updated as appropriate: allergies, current medications, past family history, past medical history, past social history, past surgical history and problem list.    Review of Systems   Constitutional: Negative for fatigue and fever.   HENT: Negative for congestion, ear pain and sore throat.    Eyes: Negative for pain and itching.   Respiratory: Negative for cough and shortness of breath.    Cardiovascular: Negative for chest pain, palpitations and leg swelling.    Gastrointestinal: Negative for abdominal pain and nausea.   Endocrine: Negative for cold intolerance and heat intolerance.   Genitourinary: Negative for dysuria and hematuria.   Musculoskeletal: Negative for arthralgias and back pain.   Skin: Negative for rash and wound.   Allergic/Immunologic: Negative for environmental allergies and food allergies.   Neurological: Negative for dizziness and headaches.   Hematological: Negative for adenopathy. Does not bruise/bleed easily.   Psychiatric/Behavioral: Negative for sleep disturbance. The patient is not nervous/anxious.        Objective   Blood pressure 130/76, pulse 68, temperature 98.1 °F (36.7 °C), temperature source Oral, resp. rate 16, weight 194 lb (88 kg), SpO2 97 %.    Physical Exam   Constitutional: She is oriented to person, place, and time. She appears well-developed and well-nourished.   HENT:   Right Ear: Tympanic membrane and ear canal normal.   Left Ear: Tympanic membrane and ear canal normal.   Mouth/Throat: Oropharynx is clear and moist and mucous membranes are normal.   Cardiovascular: Normal rate, regular rhythm and normal heart sounds.    No murmur heard.  No edema   Pulmonary/Chest: Effort normal and breath sounds normal.   Abdominal: Soft. Normal appearance and bowel sounds are normal. There is no hepatosplenomegaly. There is no tenderness.   Neurological: She is alert and oriented to person, place, and time.   Skin: Skin is warm and dry.   Psychiatric: She has a normal mood and affect. Her behavior is normal.   Vitals reviewed.    Procedures  Assessment/Plan   Bushra was seen today for hyperlipidemia.    Diagnoses and all orders for this visit:    Other hyperlipidemia  -     Lipid Panel  -     Comprehensive Metabolic Panel  -     CK    Vitamin B12 deficiency  -     Vitamin B12    Vitamin D deficiency  -     Vitamin D 25 Hydroxy      1. Hyperlipidemia: Pt's LDL is moderately elevated. She has no known CAD, but her father, who was non-smoker had  fatal MI at age 79.   She has not tolerated Pravastatin QOD or Atorvastatin due to joint pains. She was instructed to start Co Q 10 200 mg daily. Will do LP today, then reassess treatment. LDL goal < 100 and ideally 70.    2. Vitamin D Deficiency: Will repeat Vitamin D level today. Pt was counseled about risks of low Vitamin D level. She was instructed to take Vitamin D 5000 iu daily until further notice.    3. Vitamin B12 Deficiency: Will repeat Vitamin B12 today. Pt has been taking oral rather than SL Vitamin B12 2500 mcg daily. Will reassess dosing after labs complete. She was counseled about risks of low Vitamin B12 level.    4. Overweight: Current BMI is 32.6. Pt was instructed to walk daily for at lest 30 minutes. She is not interested in working with dietitian at this time. She would benefit from 15-20# weight loss over the next year.     She was counseled about risks and benefits of both Tdap and Shingles vaccines.     Continue other meds.    Fasting f/u in 3 months.    Patient Instructions   1. Labs today to check cholesterol, Vitamin D, and Vitamin B12.    2. Goal LDL < 100 and ideally 70 in light of father's history of heart disease. Resume Co Q 10 200 mg daily.     3. Tdap vaccine and Shingles (Zoster) vaccine due - to be done at pharmacy.    4. Walk at least 30 minutes daily.     5. Continue current meds.    6. Fasting f/u in 3 months.          Electronically signed by BENJI Phan 6/21/2017 1:20 PM

## 2017-06-22 ENCOUNTER — TELEPHONE (OUTPATIENT)
Dept: INTERNAL MEDICINE | Facility: CLINIC | Age: 72
End: 2017-06-22

## 2017-06-22 NOTE — TELEPHONE ENCOUNTER
Pt notified of labs: Vit D 41.5, B12 307 - better, but still borderline low; ; FBG 96; CK OK. Pt instructed to continue Vit B12 2500 mcg daily, but switch to SL; continue Vitamin D 5000 iu daily. LDL not at goal of < 100, but due to recent intolerance of statins, will hold statin for another 3 months, then retest LP. Pt was offered, but declined, referral to dietitian, wanting to tighten up on her diet. She was advised to eat heart healthy diet.

## 2017-11-10 ENCOUNTER — APPOINTMENT (OUTPATIENT)
Dept: GENERAL RADIOLOGY | Facility: HOSPITAL | Age: 72
End: 2017-11-10

## 2017-11-10 ENCOUNTER — HOSPITAL ENCOUNTER (EMERGENCY)
Facility: HOSPITAL | Age: 72
Discharge: HOME | End: 2017-11-10
Attending: EMERGENCY MEDICINE | Admitting: EMERGENCY MEDICINE

## 2017-11-10 VITALS
DIASTOLIC BLOOD PRESSURE: 66 MMHG | WEIGHT: 200 LBS | HEIGHT: 65 IN | SYSTOLIC BLOOD PRESSURE: 149 MMHG | BODY MASS INDEX: 33.32 KG/M2 | TEMPERATURE: 98.5 F | HEART RATE: 74 BPM | RESPIRATION RATE: 17 BRPM | OXYGEN SATURATION: 94 %

## 2017-11-10 DIAGNOSIS — R07.9 CHEST PAIN, UNSPECIFIED TYPE: Primary | ICD-10-CM

## 2017-11-10 LAB
ALBUMIN SERPL-MCNC: 4 G/DL (ref 3.2–4.8)
ALBUMIN/GLOB SERPL: 1.3 G/DL (ref 1.5–2.5)
ALP SERPL-CCNC: 107 U/L (ref 25–100)
ALT SERPL W P-5'-P-CCNC: 17 U/L (ref 7–40)
ANION GAP SERPL CALCULATED.3IONS-SCNC: 5 MMOL/L (ref 3–11)
AST SERPL-CCNC: 22 U/L (ref 0–33)
BASOPHILS # BLD AUTO: 0.02 10*3/MM3 (ref 0–0.2)
BASOPHILS NFR BLD AUTO: 0.4 % (ref 0–1)
BILIRUB SERPL-MCNC: 0.5 MG/DL (ref 0.3–1.2)
BNP SERPL-MCNC: 23 PG/ML (ref 0–100)
BUN BLD-MCNC: 15 MG/DL (ref 9–23)
BUN/CREAT SERPL: 21.4 (ref 7–25)
CALCIUM SPEC-SCNC: 9.2 MG/DL (ref 8.7–10.4)
CHLORIDE SERPL-SCNC: 108 MMOL/L (ref 99–109)
CO2 SERPL-SCNC: 27 MMOL/L (ref 20–31)
CREAT BLD-MCNC: 0.7 MG/DL (ref 0.6–1.3)
D DIMER PPP FEU-MCNC: 0.42 MG/L (FEU) (ref 0–0.5)
DEPRECATED RDW RBC AUTO: 45.7 FL (ref 37–54)
EOSINOPHIL # BLD AUTO: 0.04 10*3/MM3 (ref 0–0.3)
EOSINOPHIL NFR BLD AUTO: 0.9 % (ref 0–3)
ERYTHROCYTE [DISTWIDTH] IN BLOOD BY AUTOMATED COUNT: 12.7 % (ref 11.3–14.5)
GFR SERPL CREATININE-BSD FRML MDRD: 82 ML/MIN/1.73
GLOBULIN UR ELPH-MCNC: 3.1 GM/DL
GLUCOSE BLD-MCNC: 114 MG/DL (ref 70–100)
HCT VFR BLD AUTO: 43 % (ref 34.5–44)
HGB BLD-MCNC: 14.1 G/DL (ref 11.5–15.5)
HOLD SPECIMEN: NORMAL
HOLD SPECIMEN: NORMAL
IMM GRANULOCYTES # BLD: 0.01 10*3/MM3 (ref 0–0.03)
IMM GRANULOCYTES NFR BLD: 0.2 % (ref 0–0.6)
LIPASE SERPL-CCNC: 37 U/L (ref 6–51)
LYMPHOCYTES # BLD AUTO: 1.52 10*3/MM3 (ref 0.6–4.8)
LYMPHOCYTES NFR BLD AUTO: 33 % (ref 24–44)
MCH RBC QN AUTO: 32 PG (ref 27–31)
MCHC RBC AUTO-ENTMCNC: 32.8 G/DL (ref 32–36)
MCV RBC AUTO: 97.5 FL (ref 80–99)
MONOCYTES # BLD AUTO: 0.33 10*3/MM3 (ref 0–1)
MONOCYTES NFR BLD AUTO: 7.2 % (ref 0–12)
NEUTROPHILS # BLD AUTO: 2.69 10*3/MM3 (ref 1.5–8.3)
NEUTROPHILS NFR BLD AUTO: 58.3 % (ref 41–71)
PLATELET # BLD AUTO: 243 10*3/MM3 (ref 150–450)
PMV BLD AUTO: 9.9 FL (ref 6–12)
POTASSIUM BLD-SCNC: 3.8 MMOL/L (ref 3.5–5.5)
PROT SERPL-MCNC: 7.1 G/DL (ref 5.7–8.2)
RBC # BLD AUTO: 4.41 10*6/MM3 (ref 3.89–5.14)
SODIUM BLD-SCNC: 140 MMOL/L (ref 132–146)
TROPONIN I SERPL-MCNC: 0.01 NG/ML (ref 0–0.07)
TROPONIN I SERPL-MCNC: <0.006 NG/ML
WBC NRBC COR # BLD: 4.61 10*3/MM3 (ref 3.5–10.8)
WHOLE BLOOD HOLD SPECIMEN: NORMAL
WHOLE BLOOD HOLD SPECIMEN: NORMAL

## 2017-11-10 PROCEDURE — 80053 COMPREHEN METABOLIC PANEL: CPT | Performed by: EMERGENCY MEDICINE

## 2017-11-10 PROCEDURE — 85025 COMPLETE CBC W/AUTO DIFF WBC: CPT | Performed by: EMERGENCY MEDICINE

## 2017-11-10 PROCEDURE — 85379 FIBRIN DEGRADATION QUANT: CPT | Performed by: PHYSICIAN ASSISTANT

## 2017-11-10 PROCEDURE — 99284 EMERGENCY DEPT VISIT MOD MDM: CPT

## 2017-11-10 PROCEDURE — 93005 ELECTROCARDIOGRAM TRACING: CPT | Performed by: EMERGENCY MEDICINE

## 2017-11-10 PROCEDURE — 71010 HC CHEST PA OR AP: CPT

## 2017-11-10 PROCEDURE — 84484 ASSAY OF TROPONIN QUANT: CPT | Performed by: EMERGENCY MEDICINE

## 2017-11-10 PROCEDURE — 83690 ASSAY OF LIPASE: CPT | Performed by: EMERGENCY MEDICINE

## 2017-11-10 PROCEDURE — 94770: CPT

## 2017-11-10 PROCEDURE — 83880 ASSAY OF NATRIURETIC PEPTIDE: CPT | Performed by: EMERGENCY MEDICINE

## 2017-11-10 PROCEDURE — 84484 ASSAY OF TROPONIN QUANT: CPT

## 2017-11-10 RX ORDER — SODIUM CHLORIDE 0.9 % (FLUSH) 0.9 %
10 SYRINGE (ML) INJECTION AS NEEDED
Status: DISCONTINUED | OUTPATIENT
Start: 2017-11-10 | End: 2017-11-10 | Stop reason: HOSPADM

## 2017-11-10 RX ORDER — NITROGLYCERIN 0.4 MG/1
0.4 TABLET SUBLINGUAL
Status: DISCONTINUED | OUTPATIENT
Start: 2017-11-10 | End: 2017-11-10 | Stop reason: HOSPADM

## 2017-11-10 RX ORDER — ASPIRIN 81 MG/1
324 TABLET, CHEWABLE ORAL ONCE
Status: COMPLETED | OUTPATIENT
Start: 2017-11-10 | End: 2017-11-10

## 2017-11-10 RX ADMIN — NITROGLYCERIN 0.4 MG: 0.4 TABLET SUBLINGUAL at 07:39

## 2017-11-10 RX ADMIN — ASPIRIN 81 MG 324 MG: 81 TABLET ORAL at 07:26

## 2017-11-10 NOTE — ED NOTES
Patient requested another blanket. Gave patient another blanket. Patient happy, states she does not need anything else     Giacomo Ortega  11/10/17 1005

## 2017-11-10 NOTE — DISCHARGE INSTRUCTIONS
Rest.  Continue your daily aspirin.  Return to ER if worse.  Follow up with the chest pain clinic (they will call you for time).  Choose one of the [facility] providers below to establish a PCP.  Follow up with one of the [facility] physician groups below to setup primary care. If you have trouble following up, please call Leny Oh, our transitional care nurse, at [phone].    (Dr. Hunter, Dr. Esparza, Dr. Michael, and Dr. Tillman.)  [facility], Primary Care, [phone], [address], [place] [address]    [facility], Primary Care, [phone], [address], [facility] [address] [place], [address] [facility], Primary Care, [phone], [address], [address] [place], [address] [facility], Primary Care, [phone], [address], [address] [place], [address]     [place] [address] [facility], Primary Care, [phone], [address], [address] [place], [address]    [place] [address] [facility], Primary Care, [phone], [address]    [place], [address] [facility], Primary Care, [phone], [address], [address] [place], [address] [facility], Primary Care, [phone], [address], [address] [place], [address] [facility]) [facility], Primary Care, [phone], [address], [address] [place], [address] [facility], Primary Care, [phone], [address], [address] [place], [address] [facility], Primary Care, [phone], [address], [address], [place], [address]     [facility], Primary Care,  [phone], [address], [place] [address]

## 2017-11-10 NOTE — ED PROVIDER NOTES
"Subjective   HPI Comments: 72-year-old female presents to the emergency department with complaints of left-sided chest pain.  The patient states that she has had a \"odd feeling\" in her chest for about a month.  This morning, she had fairly severe pain in the left chest that radiated into the neck.  The pain began about 6:30 this morning at rest.  She had mild shortness of breath associated with the pain.  She notes that she has had a mild cough for about the past 3-4 weeks.  No nausea or diaphoresis.  No fever.  Past medical history of hyperlipidemia, uterine cancer in 2010 treated with hysterectomy and chemotherapy to resolution, bilateral knee replacement.  The patient is a former smoker but quit 25 years ago.  She denies any alcohol or drug use.  Her PCP is Dr. Cherry.  She has not had any previous cardiac workups.    Patient is a 72 y.o. female presenting with chest pain.   History provided by:  Patient  Chest Pain   Pain location:  L chest  Pain quality comment:  Unable to describe  Pain radiates to:  Neck  Pain severity:  Moderate  Onset quality:  Sudden  Duration: odd sensation in chest for a month, but acute pain began at 6:30 this morning.  Timing:  Constant  Context: at rest    Relieved by:  Nothing  Worsened by:  Nothing  Ineffective treatments:  None tried  Associated symptoms: cough (mild, non-productive) and shortness of breath (mild)    Associated symptoms: no abdominal pain, no anxiety, no back pain, no dizziness, no fever, no headache, no heartburn, no lower extremity edema, no nausea, no orthopnea, no palpitations, no syncope, no vomiting and no weakness        Review of Systems   Constitutional: Negative for chills and fever.   HENT: Negative for congestion, ear pain, nosebleeds, rhinorrhea and sore throat.    Eyes: Negative for pain, discharge and visual disturbance.   Respiratory: Positive for cough (mild, non-productive) and shortness of breath (mild). Negative for wheezing.    Cardiovascular: " Positive for chest pain. Negative for palpitations, orthopnea, leg swelling and syncope.   Gastrointestinal: Negative for abdominal pain, blood in stool, diarrhea, heartburn, nausea and vomiting.   Endocrine: Negative.    Genitourinary: Negative for dysuria, hematuria and urgency.   Musculoskeletal: Negative for arthralgias and back pain.   Skin: Negative for pallor and rash.   Allergic/Immunologic: Negative for immunocompromised state.   Neurological: Negative for dizziness, speech difficulty, weakness and headaches.   Hematological: Negative for adenopathy. Does not bruise/bleed easily.   Psychiatric/Behavioral: Negative.        Past Medical History:   Diagnosis Date   • Colon polyp     per colonoscopy 7/17/13   • Diverticulosis     per colonoscopy 7/17/13   • Hyperlipidemia    • Internal hemorrhoid     per colonoscopy 7/17/13   • Osteoarthritis    • Uterine cancer     2010   • Vitamin B12 deficiency    • Vitamin D deficiency        Allergies   Allergen Reactions   • Atorvastatin      Joint pains   • Pravastatin Sodium      Ankle pain       Past Surgical History:   Procedure Laterality Date   • APPENDECTOMY  2010    during hysterectomy   • BREAST CYST EXCISION     • CATARACT EXTRACTION, BILATERAL  2002   • OMENTECTOMY  2010    with TRINA/BSO - uterine cancer   • REPLACEMENT TOTAL KNEE BILATERAL Bilateral 2012   • TOTAL ABDOMINAL HYSTERECTOMY WITH SALPINGO OOPHORECTOMY  2010    uterine cancer; 6 chemotherapy treatments       Family History   Problem Relation Age of Onset   • Lymphoma Mother      non-hodgkins   • Heart attack Father    • Colon cancer Sister 55   • Stomach cancer Brother 78     non-smoker   • Diabetes Sister      diet controlled   • Breast cancer Other    • Breast cancer Other    • Stroke Neg Hx    • Thyroid disease Neg Hx        Social History     Social History   • Marital status:      Spouse name: N/A   • Number of children: N/A   • Years of education: N/A     Occupational History   • Retired  "     Social History Main Topics   • Smoking status: Former Smoker   • Smokeless tobacco: None      Comment: smoked socially as young adult   • Alcohol use Yes      Comment: 2-3 drinks weekly   • Drug use: No   • Sexual activity: Not Asked     Other Topics Concern   • None     Social History Narrative    Domestic life : Lives with         Episcopal : [employer]        Sleep hygiene : 7-8 hours/night        Caffeine use : 1 serving of diet coke or tea daily        Exercise habits : Walks 30 minutes daily        Diet : Well-balanced, but loves sweets        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 She appears well-developed and well-nourished. No distress.   HENT:   Head: Normocephalic and atraumatic.   Nose: Nose normal.   Mouth/Throat: Oropharynx is clear and moist.   Eyes: EOM are normal. Pupils are equal, round, and reactive to light. No scleral icterus.   Neck: Normal range of motion. Neck supple.   Cardiovascular: Normal rate, regular rhythm, normal heart sounds and intact distal pulses.    No murmur heard.  Pulmonary/Chest: Effort normal and breath sounds normal. No respiratory distress. She has no wheezes. She has no rales. She exhibits no tenderness.   Abdominal: Soft. Bowel sounds are normal. There is no tenderness.   Musculoskeletal: Normal range of motion. She exhibits no edema or tenderness.   Neurological: She is alert and oriented to person, place, and time.   Skin: Skin is warm and dry. No rash noted. She is not diaphoretic.   Psychiatric: She has a normal mood and affect.   Nursing note and vitals reviewed.      Procedures         ED Course  ED Course    Pt resting comfortably.  States her chest discomfort is now 3/10 and \"dull\".  Both sets of troponins are negative, with no upward trend.  " EKG is normal.  Negative chest xray.  D-dimer is negative.  Her heart score is 4.   Will have cardiology come see her in the ER.  1:33 PM  Spoke with Crista in cardiology.  She will have someone come see the pt.  1:33 PM  Pt resting comfortably.  Pt states cardiology has not been in to see her yet.  I spoke with Shanita at cardiology and she advised that they were running behind but still planned to see her.  I advised the pt.    1:33 PM  Pt still hasn't seen cardiology and is upset and wants to go home.  Discussed risks and benefits of leaving and she still wants to go home.  She is alert and well oriented, fully capable of making her own decision regarding this.  I will still plan to refer her to the chest pain clinic and have her return to ER if worsening or persistent symptoms.  Recent Results (from the past 24 hour(s))   BNP    Collection Time: 11/10/17  7:17 AM   Result Value Ref Range    BNP 23.0 0.0 - 100.0 pg/mL   Lavender Top    Collection Time: 11/10/17  7:17 AM   Result Value Ref Range    Extra Tube hold for add-on    Gold Top - SST    Collection Time: 11/10/17  7:17 AM   Result Value Ref Range    Extra Tube Hold for add-ons.    CBC Auto Differential    Collection Time: 11/10/17  7:17 AM   Result Value Ref Range    WBC 4.61 3.50 - 10.80 10*3/mm3    RBC 4.41 3.89 - 5.14 10*6/mm3    Hemoglobin 14.1 11.5 - 15.5 g/dL    Hematocrit 43.0 34.5 - 44.0 %    MCV 97.5 80.0 - 99.0 fL    MCH 32.0 (H) 27.0 - 31.0 pg    MCHC 32.8 32.0 - 36.0 g/dL    RDW 12.7 11.3 - 14.5 %    RDW-SD 45.7 37.0 - 54.0 fl    MPV 9.9 6.0 - 12.0 fL    Platelets 243 150 - 450 10*3/mm3    Neutrophil % 58.3 41.0 - 71.0 %    Lymphocyte % 33.0 24.0 - 44.0 %    Monocyte % 7.2 0.0 - 12.0 %    Eosinophil % 0.9 0.0 - 3.0 %    Basophil % 0.4 0.0 - 1.0 %    Immature Grans % 0.2 0.0 - 0.6 %    Neutrophils, Absolute 2.69 1.50 - 8.30 10*3/mm3    Lymphocytes, Absolute 1.52 0.60 - 4.80 10*3/mm3    Monocytes, Absolute 0.33 0.00 - 1.00 10*3/mm3    Eosinophils,  Absolute 0.04 0.00 - 0.30 10*3/mm3    Basophils, Absolute 0.02 0.00 - 0.20 10*3/mm3    Immature Grans, Absolute 0.01 0.00 - 0.03 10*3/mm3   POC Troponin, Rapid    Collection Time: 11/10/17  7:25 AM   Result Value Ref Range    Troponin I 0.01 0.00 - 0.07 ng/mL   Comprehensive Metabolic Panel    Collection Time: 11/10/17  7:57 AM   Result Value Ref Range    Glucose 114 (H) 70 - 100 mg/dL    BUN 15 9 - 23 mg/dL    Creatinine 0.70 0.60 - 1.30 mg/dL    Sodium 140 132 - 146 mmol/L    Potassium 3.8 3.5 - 5.5 mmol/L    Chloride 108 99 - 109 mmol/L    CO2 27.0 20.0 - 31.0 mmol/L    Calcium 9.2 8.7 - 10.4 mg/dL    Total Protein 7.1 5.7 - 8.2 g/dL    Albumin 4.00 3.20 - 4.80 g/dL    ALT (SGPT) 17 7 - 40 U/L    AST (SGOT) 22 0 - 33 U/L    Alkaline Phosphatase 107 (H) 25 - 100 U/L    Total Bilirubin 0.5 0.3 - 1.2 mg/dL    eGFR Non African Amer 82 >60 mL/min/1.73    Globulin 3.1 gm/dL    A/G Ratio 1.3 (L) 1.5 - 2.5 g/dL    BUN/Creatinine Ratio 21.4 7.0 - 25.0    Anion Gap 5.0 3.0 - 11.0 mmol/L   Lipase    Collection Time: 11/10/17  7:57 AM   Result Value Ref Range    Lipase 37 6 - 51 U/L   Light Blue Top    Collection Time: 11/10/17  7:57 AM   Result Value Ref Range    Extra Tube hold for add-on    Green Top (Gel)    Collection Time: 11/10/17  7:57 AM   Result Value Ref Range    Extra Tube Hold for add-ons.    D-dimer, Quantitative    Collection Time: 11/10/17  9:09 AM   Result Value Ref Range    D-Dimer, Quantitative 0.42 0.00 - 0.50 mg/L (FEU)   Troponin    Collection Time: 11/10/17  9:21 AM   Result Value Ref Range    Troponin I <0.006 <=0.039 ng/mL     Note: In addition to lab results from this visit, the labs listed above may include labs taken at another facility or during a different encounter within the last 24 hours. Please correlate lab times with ED admission and discharge times for further clarification of the services performed during this visit.    XR Chest 1 View   Final Result   Chronic lung changes without acute  parenchymal disease   process. Borderline cardiomegaly.       D:  11/10/2017   E:  11/10/2017       This report was finalized on 11/10/2017 11:11 AM by Dr. Chi Long.            Vitals:    11/10/17 0900 11/10/17 1000 11/10/17 1100 11/10/17 1200   BP: 136/67 151/67 144/62 140/64   Pulse: 57 56 60 58   Resp:       Temp:       TempSrc:       SpO2: 96% 98% 96% 95%   Weight:       Height:         Medications   sodium chloride 0.9 % flush 10 mL (not administered)   nitroglycerin (NITROSTAT) SL tablet 0.4 mg (0.4 mg Sublingual Given 11/10/17 0739)   aspirin chewable tablet 324 mg (324 mg Oral Given 11/10/17 0726)     ECG/EMG Results (last 24 hours)     Procedure Component Value Units Date/Time    ECG 12 Lead [[id number]] Collected:  11/10/17 0719     Updated:  11/10/17 0739    ECG 12 Lead [[id number]] Collected:  11/10/17 1019     Updated:  11/10/17 1024            Recent Results (from the past 24 hour(s))   BNP    Collection Time: 11/10/17  7:17 AM   Result Value Ref Range    BNP 23.0 0.0 - 100.0 pg/mL   Lavender Top    Collection Time: 11/10/17  7:17 AM   Result Value Ref Range    Extra Tube hold for add-on    Gold Top - SST    Collection Time: 11/10/17  7:17 AM   Result Value Ref Range    Extra Tube Hold for add-ons.    CBC Auto Differential    Collection Time: 11/10/17  7:17 AM   Result Value Ref Range    WBC 4.61 3.50 - 10.80 10*3/mm3    RBC 4.41 3.89 - 5.14 10*6/mm3    Hemoglobin 14.1 11.5 - 15.5 g/dL    Hematocrit 43.0 34.5 - 44.0 %    MCV 97.5 80.0 - 99.0 fL    MCH 32.0 (H) 27.0 - 31.0 pg    MCHC 32.8 32.0 - 36.0 g/dL    RDW 12.7 11.3 - 14.5 %    RDW-SD 45.7 37.0 - 54.0 fl    MPV 9.9 6.0 - 12.0 fL    Platelets 243 150 - 450 10*3/mm3    Neutrophil % 58.3 41.0 - 71.0 %    Lymphocyte % 33.0 24.0 - 44.0 %    Monocyte % 7.2 0.0 - 12.0 %    Eosinophil % 0.9 0.0 - 3.0 %    Basophil % 0.4 0.0 - 1.0 %    Immature Grans % 0.2 0.0 - 0.6 %    Neutrophils, Absolute 2.69 1.50 - 8.30 10*3/mm3    Lymphocytes, Absolute 1.52 0.60 -  4.80 10*3/mm3    Monocytes, Absolute 0.33 0.00 - 1.00 10*3/mm3    Eosinophils, Absolute 0.04 0.00 - 0.30 10*3/mm3    Basophils, Absolute 0.02 0.00 - 0.20 10*3/mm3    Immature Grans, Absolute 0.01 0.00 - 0.03 10*3/mm3   POC Troponin, Rapid    Collection Time: 11/10/17  7:25 AM   Result Value Ref Range    Troponin I 0.01 0.00 - 0.07 ng/mL   Comprehensive Metabolic Panel    Collection Time: 11/10/17  7:57 AM   Result Value Ref Range    Glucose 114 (H) 70 - 100 mg/dL    BUN 15 9 - 23 mg/dL    Creatinine 0.70 0.60 - 1.30 mg/dL    Sodium 140 132 - 146 mmol/L    Potassium 3.8 3.5 - 5.5 mmol/L    Chloride 108 99 - 109 mmol/L    CO2 27.0 20.0 - 31.0 mmol/L    Calcium 9.2 8.7 - 10.4 mg/dL    Total Protein 7.1 5.7 - 8.2 g/dL    Albumin 4.00 3.20 - 4.80 g/dL    ALT (SGPT) 17 7 - 40 U/L    AST (SGOT) 22 0 - 33 U/L    Alkaline Phosphatase 107 (H) 25 - 100 U/L    Total Bilirubin 0.5 0.3 - 1.2 mg/dL    eGFR Non African Amer 82 >60 mL/min/1.73    Globulin 3.1 gm/dL    A/G Ratio 1.3 (L) 1.5 - 2.5 g/dL    BUN/Creatinine Ratio 21.4 7.0 - 25.0    Anion Gap 5.0 3.0 - 11.0 mmol/L   Lipase    Collection Time: 11/10/17  7:57 AM   Result Value Ref Range    Lipase 37 6 - 51 U/L   Light Blue Top    Collection Time: 11/10/17  7:57 AM   Result Value Ref Range    Extra Tube hold for add-on    Green Top (Gel)    Collection Time: 11/10/17  7:57 AM   Result Value Ref Range    Extra Tube Hold for add-ons.    D-dimer, Quantitative    Collection Time: 11/10/17  9:09 AM   Result Value Ref Range    D-Dimer, Quantitative 0.42 0.00 - 0.50 mg/L (FEU)   Troponin    Collection Time: 11/10/17  9:21 AM   Result Value Ref Range    Troponin I <0.006 <=0.039 ng/mL     Note: In addition to lab results from this visit, the labs listed above may include labs taken at another facility or during a different encounter within the last 24 hours. Please correlate lab times with ED admission and discharge times for further clarification of the services performed during this  visit.    XR Chest 1 View   Final Result   Chronic lung changes without acute parenchymal disease   process. Borderline cardiomegaly.       D:  11/10/2017   E:  11/10/2017       This report was finalized on 11/10/2017 11:11 AM by Dr. Chi Long.            Vitals:    11/10/17 0900 11/10/17 1000 11/10/17 1100 11/10/17 1200   BP: 136/67 151/67 144/62 140/64   Pulse: 57 56 60 58   Resp:       Temp:       TempSrc:       SpO2: 96% 98% 96% 95%   Weight:       Height:         Medications   sodium chloride 0.9 % flush 10 mL (not administered)   nitroglycerin (NITROSTAT) SL tablet 0.4 mg (0.4 mg Sublingual Given 11/10/17 0739)   aspirin chewable tablet 324 mg (324 mg Oral Given 11/10/17 0726)     ECG/EMG Results (last 24 hours)     Procedure Component Value Units Date/Time    ECG 12 Lead [[id number]] Collected:  11/10/17 0719     Updated:  11/10/17 0739    ECG 12 Lead [[id number]] Collected:  11/10/17 1019     Updated:  11/10/17 1024                      MDM    Final diagnoses:   Chest pain, unspecified type            CONNOR Donald  11/10/17 [id number]

## 2017-11-13 ENCOUNTER — OFFICE VISIT (OUTPATIENT)
Dept: CARDIOLOGY | Facility: HOSPITAL | Age: 72
End: 2017-11-13

## 2017-11-13 VITALS
WEIGHT: 201 LBS | SYSTOLIC BLOOD PRESSURE: 165 MMHG | TEMPERATURE: 98.1 F | BODY MASS INDEX: 33.49 KG/M2 | DIASTOLIC BLOOD PRESSURE: 77 MMHG | HEART RATE: 78 BPM | HEIGHT: 65 IN | RESPIRATION RATE: 18 BRPM | OXYGEN SATURATION: 100 %

## 2017-11-13 DIAGNOSIS — R00.1 BRADYCARDIA: ICD-10-CM

## 2017-11-13 DIAGNOSIS — R07.9 CHEST PAIN, UNSPECIFIED TYPE: Primary | ICD-10-CM

## 2017-11-13 DIAGNOSIS — R10.13 DYSPEPSIA: ICD-10-CM

## 2017-11-13 DIAGNOSIS — R03.0 ELEVATED BLOOD PRESSURE READING: ICD-10-CM

## 2017-11-13 PROCEDURE — 99214 OFFICE O/P EST MOD 30 MIN: CPT | Performed by: NURSE PRACTITIONER

## 2017-11-13 NOTE — PROGRESS NOTES
"[facility]  [facility]    Encounter Date:2017     Bushra Vaz  [address] [facility] [address]  [phone]    1945    No Known Provider    Bushra Vaz is a 72 y.o. female.      Subjective:     Chief Complaint:  Chest Pain (dyspnea,  radiating chest pain.)       HPI      72-year-old female presented to [facility] ED 11/10/17  with complaints of left-sided chest pain.  The patient states that she has had a \"odd feeling\" in her chest for about a month.  The morning of ED visit, she had fairly severe pain in the left chest that radiated into the neck.  Pain occurred at rest She had mild shortness of breath associated with the pain.  She notes that she has had a mild cough for about the past 3-4 weeks.  No nausea or diaphoresis.  No fever.  No exertional CP, pressure.  Dyspnea with exertion is described as mild to moderate.  Past medical history of hyperlipidemia, uterine cancer in  treated with hysterectomy and chemotherapy to resolution, bilateral knee replacement.  The patient is a former smoker but quit 25 years ago.  She denies any alcohol or drug use.  Father  of MI in his 70's.   She has not had any previous cardiac workups.  Chest discomfort has continued. Described as a burning sensation.  Has not improved with PRN TUMS.       Patient is a 72 y.o. female presenting with chest pain.   History provided by:  Patient  Chest Pain   Pain location:  L chest  Pain quality comment:  Unable to describe  Pain radiates to:  Neck  Pain severity:  Moderate  Onset quality:  Sudden  Duration: odd sensation in chest for a month, recently worsened  Timing:  Constant  Context: at rest    Relieved by:  Nothing  Worsened by:  Nothing  Ineffective treatments:  TUMS  Associated symptoms: cough (mild, non-productive) and shortness of breath (mild-moderate)    Associated symptoms: no abdominal pain, no anxiety, no back pain, no dizziness, no fever, no headache, " no lower extremity edema, no nausea, no orthopnea, no palpitations    Cardiac risk factors:  History of dyslipidemia  Tobacco use remote, sedentary lifestyle, obesity (BMI > 30),  age (>50)      Allergies   Allergen Reactions   • Atorvastatin      Joint pains   • Pravastatin Sodium      Ankle pain         Current Outpatient Prescriptions:   •  acetaminophen (TYLENOL) 500 MG tablet, Take 500 mg by mouth Every 6 (Six) Hours As Needed for mild pain (1-3) (pain)., Disp: , Rfl:   •  aspirin 81 MG tablet, Take 1 tablet by mouth Daily., Disp: , Rfl:     The following portions of the patient's history were reviewed and updated as appropriate in Epic:  Problem list, allergies, current medications, past medical and surgical history, past social and family history.     Review of Systems   Constitution: Negative for chills, decreased appetite, diaphoresis, fever, weakness, malaise/fatigue, night sweats, weight gain and weight loss.   HENT: Negative for congestion and nosebleeds.    Eyes: Negative for blurred vision, visual disturbance and visual halos.   Cardiovascular: Positive for chest pain. Negative for claudication, cyanosis, dyspnea on exertion, irregular heartbeat, leg swelling, near-syncope, orthopnea, palpitations, paroxysmal nocturnal dyspnea and syncope.   Respiratory: Negative for cough, hemoptysis, shortness of breath, sleep disturbances due to breathing, snoring, sputum production and wheezing.    Endocrine: Negative for cold intolerance, heat intolerance, polydipsia, polyphagia and polyuria.   Hematologic/Lymphatic: Does not bruise/bleed easily.   Skin: Negative for dry skin, itching and rash.   Musculoskeletal: Negative for falls, joint pain, joint swelling, muscle weakness and myalgias.   Gastrointestinal: Positive for heartburn. Negative for bloating, abdominal pain, constipation, diarrhea, dysphagia, melena, nausea and vomiting.   Genitourinary: Negative for dysuria, flank pain, hematuria and nocturia.  "  Neurological: Negative for difficulty with concentration, excessive daytime sleepiness, dizziness, headaches and loss of balance.   Psychiatric/Behavioral: Negative for altered mental status and depression. The patient is not nervous/anxious.    Allergic/Immunologic: Negative for environmental allergies.       Objective:     Vitals:    11/13/17 0929 11/13/17 0931 11/13/17 0932   BP: 149/73 157/75 165/77   BP Location: Right arm Left arm Left arm   Patient Position: Sitting Sitting Standing   Pulse: 69 72 78   Resp: 18     Temp: 98.1 °F (36.7 °C)     TempSrc: Temporal Artery      SpO2: 100%     Weight: 201 lb (91.2 kg)     Height: 65\" (165.1 cm)           Physical Exam   Constitutional: She is oriented to person, place, and time. She appears well-developed and well-nourished. No distress.   HENT:   Head: Normocephalic and atraumatic.   Mouth/Throat: Oropharynx is clear and moist.   Eyes: Conjunctivae are normal. Pupils are equal, round, and reactive to light. No scleral icterus.   Neck: No hepatojugular reflux and no JVD present. Carotid bruit is not present. No tracheal deviation present. No thyromegaly present.   Cardiovascular: Normal rate, regular rhythm, normal heart sounds and intact distal pulses.  Exam reveals no friction rub.    No murmur heard.  Pulmonary/Chest: Effort normal and breath sounds normal.   Abdominal: Soft. Bowel sounds are normal. She exhibits no distension. There is no tenderness.   Musculoskeletal: She exhibits no edema.   Lymphadenopathy:     She has no cervical adenopathy.   Neurological: She is alert and oriented to person, place, and time.   Skin: Skin is warm, dry and intact. No rash noted. No cyanosis or erythema. No pallor.   Psychiatric: She has a normal mood and affect. Her behavior is normal. Thought content normal.   Vitals reviewed.      Lab and Diagnostic Review:  Admission on 11/10/2017, Discharged on 11/10/2017   Component Date Value Ref Range Status   • Glucose 11/10/2017 " 114* 70 - 100 mg/dL Final   • BUN 11/10/2017 15  9 - 23 mg/dL Final   • Creatinine 11/10/2017 0.70  0.60 - 1.30 mg/dL Final   • Sodium 11/10/2017 140  132 - 146 mmol/L Final   • Potassium 11/10/2017 3.8  3.5 - 5.5 mmol/L Final   • Chloride 11/10/2017 108  99 - 109 mmol/L Final   • CO2 11/10/2017 27.0  20.0 - 31.0 mmol/L Final   • Calcium 11/10/2017 9.2  8.7 - 10.4 mg/dL Final   • Total Protein 11/10/2017 7.1  5.7 - 8.2 g/dL Final   • Albumin 11/10/2017 4.00  3.20 - 4.80 g/dL Final   • ALT (SGPT) 11/10/2017 17  7 - 40 U/L Final   • AST (SGOT) 11/10/2017 22  0 - 33 U/L Final   • Alkaline Phosphatase 11/10/2017 107* 25 - 100 U/L Final   • Total Bilirubin 11/10/2017 0.5  0.3 - 1.2 mg/dL Final   • eGFR Non African Amer 11/10/2017 82  >60 mL/min/1.73 Final   • Globulin 11/10/2017 3.1  gm/dL Final   • A/G Ratio 11/10/2017 1.3* 1.5 - 2.5 g/dL Final   • BUN/Creatinine Ratio 11/10/2017 21.4  7.0 - 25.0 Final   • Anion Gap 11/10/2017 5.0  3.0 - 11.0 mmol/L Final   • Lipase 11/10/2017 37  6 - 51 U/L Final   • BNP 11/10/2017 23.0  0.0 - 100.0 pg/mL Final   • Extra Tube 11/10/2017 hold for add-on   Final    Auto resulted   • Extra Tube 11/10/2017 Hold for add-ons.   Final    Auto resulted.   • Extra Tube 11/10/2017 hold for add-on   Final    Auto resulted   • Extra Tube 11/10/2017 Hold for add-ons.   Final    Auto resulted.   • WBC 11/10/2017 4.61  3.50 - 10.80 10*3/mm3 Final   • RBC 11/10/2017 4.41  3.89 - 5.14 10*6/mm3 Final   • Hemoglobin 11/10/2017 14.1  11.5 - 15.5 g/dL Final   • Hematocrit 11/10/2017 43.0  34.5 - 44.0 % Final   • MCV 11/10/2017 97.5  80.0 - 99.0 fL Final   • MCH 11/10/2017 32.0* 27.0 - 31.0 pg Final   • MCHC 11/10/2017 32.8  32.0 - 36.0 g/dL Final   • RDW 11/10/2017 12.7  11.3 - 14.5 % Final   • RDW-SD 11/10/2017 45.7  37.0 - 54.0 fl Final   • MPV 11/10/2017 9.9  6.0 - 12.0 fL Final   • Platelets 11/10/2017 243  150 - 450 10*3/mm3 Final   • Neutrophil % 11/10/2017 58.3  41.0 - 71.0 % Final   • Lymphocyte %  11/10/2017 33.0  24.0 - 44.0 % Final   • Monocyte % 11/10/2017 7.2  0.0 - 12.0 % Final   • Eosinophil % 11/10/2017 0.9  0.0 - 3.0 % Final   • Basophil % 11/10/2017 0.4  0.0 - 1.0 % Final   • Immature Grans % 11/10/2017 0.2  0.0 - 0.6 % Final   • Neutrophils, Absolute 11/10/2017 2.69  1.50 - 8.30 10*3/mm3 Final   • Lymphocytes, Absolute 11/10/2017 1.52  0.60 - 4.80 10*3/mm3 Final   • Monocytes, Absolute 11/10/2017 0.33  0.00 - 1.00 10*3/mm3 Final   • Eosinophils, Absolute 11/10/2017 0.04  0.00 - 0.30 10*3/mm3 Final   • Basophils, Absolute 11/10/2017 0.02  0.00 - 0.20 10*3/mm3 Final   • Immature Grans, Absolute 11/10/2017 0.01  0.00 - 0.03 10*3/mm3 Final   • D-Dimer, Quantitative 11/10/2017 0.42  0.00 - 0.50 mg/L (FEU) Final   • Troponin I 11/10/2017 0.01  0.00 - 0.07 ng/mL Final    Serial Number: [id number]:  [id number]   • Troponin I 11/10/2017 <0.006  <=0.039 ng/mL Final     EKG 11/10/17: Sinus bradycardia 55 bpm  Assessment and Plan:     1. Chest pain, unspecified type  JAYNE:  1  Radiating symptoms, dyspnea    Discuss stress test options.  Patient wants to try a treadmill stress test at this time.  Patient understands that if she is unable to reach target heart rate or test is nondiagnostic may need to consider other options.  - Treadmill Stress Test; Future    2. Elevated blood pressure reading  Elevated today.  Denies history of hypertension.  Continue to monitor    3. Dyspepsia  Possible dyspepsia.  Patient will begin over-the-counter Pepcid daily and monitor symptoms    4. Bradycardia  A bradycardiac during ER visit, continue to monitor.  Discussed signs and symptoms of bradycardia and when to call    Patient will follow-up in the heart and valve Center 4-6 weeks or sooner if needed      *Please note that portions of this note were completed with a voice recognition program. Efforts were made to edit the dictations, but occasionally words are mistranscribed.

## 2017-11-15 ENCOUNTER — HOSPITAL ENCOUNTER (OUTPATIENT)
Dept: CARDIOLOGY | Facility: HOSPITAL | Age: 72
Discharge: HOME | End: 2017-11-15
Admitting: NURSE PRACTITIONER

## 2017-11-15 DIAGNOSIS — R07.9 CHEST PAIN, UNSPECIFIED TYPE: ICD-10-CM

## 2017-11-15 LAB
BH CV STRESS BP STAGE 1: NORMAL
BH CV STRESS DURATION MIN STAGE 1: 3
BH CV STRESS DURATION MIN STAGE 2: 1
BH CV STRESS DURATION SEC STAGE 1: 0
BH CV STRESS DURATION SEC STAGE 2: 0
BH CV STRESS GRADE STAGE 1: 10
BH CV STRESS GRADE STAGE 2: 12
BH CV STRESS HR STAGE 1: 141
BH CV STRESS HR STAGE 2: 150
BH CV STRESS METS STAGE 1: 5
BH CV STRESS METS STAGE 2: 7.5
BH CV STRESS PROTOCOL 1: NORMAL
BH CV STRESS RECOVERY BP: NORMAL MMHG
BH CV STRESS RECOVERY HR: 79 BPM
BH CV STRESS SPEED STAGE 1: 1.7
BH CV STRESS SPEED STAGE 2: 2.5
BH CV STRESS STAGE 1: 1
BH CV STRESS STAGE 2: 2
MAXIMAL PREDICTED HEART RATE: 146 BPM
PERCENT MAX PREDICTED HR: 102.74 %
STRESS BASELINE BP: NORMAL MMHG
STRESS BASELINE HR: 75 BPM
STRESS PERCENT HR: 121 %
STRESS POST ESTIMATED WORKLOAD: 7 METS
STRESS POST EXERCISE DUR MIN: 4 MIN
STRESS POST EXERCISE DUR SEC: 0 SEC
STRESS POST PEAK BP: NORMAL MMHG
STRESS POST PEAK HR: 150 BPM
STRESS TARGET HR: 124 BPM

## 2017-11-15 PROCEDURE — 93018 CV STRESS TEST I&R ONLY: CPT | Performed by: INTERNAL MEDICINE

## 2017-11-15 PROCEDURE — 93017 CV STRESS TEST TRACING ONLY: CPT

## 2017-11-16 PROBLEM — R07.9 CHEST PAIN: Status: ACTIVE | Noted: 2017-11-16

## 2018-02-21 ENCOUNTER — OFFICE VISIT (OUTPATIENT)
Dept: FAMILY MEDICINE CLINIC | Facility: CLINIC | Age: 73
End: 2018-02-21

## 2018-02-21 VITALS
WEIGHT: 198.8 LBS | SYSTOLIC BLOOD PRESSURE: 134 MMHG | HEART RATE: 78 BPM | HEIGHT: 65 IN | OXYGEN SATURATION: 99 % | DIASTOLIC BLOOD PRESSURE: 82 MMHG | TEMPERATURE: 98.1 F | BODY MASS INDEX: 33.12 KG/M2

## 2018-02-21 DIAGNOSIS — Z85.42 HISTORY OF UTERINE CANCER: ICD-10-CM

## 2018-02-21 DIAGNOSIS — E78.2 MIXED HYPERLIPIDEMIA: Primary | ICD-10-CM

## 2018-02-21 DIAGNOSIS — Z12.39 BREAST CANCER SCREENING: ICD-10-CM

## 2018-02-21 DIAGNOSIS — Z12.11 COLON CANCER SCREENING: ICD-10-CM

## 2018-02-21 PROBLEM — M81.0 OSTEOPOROSIS: Status: RESOLVED | Noted: 2017-02-24 | Resolved: 2018-02-21

## 2018-02-21 PROBLEM — R03.0 ELEVATED BLOOD PRESSURE READING: Status: RESOLVED | Noted: 2017-11-13 | Resolved: 2018-02-21

## 2018-02-21 PROBLEM — M15.9 OSTEOARTHRITIS OF MULTIPLE JOINTS: Status: RESOLVED | Noted: 2017-02-06 | Resolved: 2018-02-21

## 2018-02-21 PROBLEM — R53.83 FATIGUE: Status: RESOLVED | Noted: 2017-02-06 | Resolved: 2018-02-21

## 2018-02-21 PROBLEM — Z00.00 PREVENTATIVE HEALTH CARE: Status: RESOLVED | Noted: 2017-02-06 | Resolved: 2018-02-21

## 2018-02-21 PROBLEM — K59.01 SLOW TRANSIT CONSTIPATION: Status: RESOLVED | Noted: 2017-03-17 | Resolved: 2018-02-21

## 2018-02-21 PROBLEM — E55.9 VITAMIN D DEFICIENCY: Status: RESOLVED | Noted: 2017-02-06 | Resolved: 2018-02-21

## 2018-02-21 PROBLEM — R07.9 CHEST PAIN: Status: RESOLVED | Noted: 2017-11-16 | Resolved: 2018-02-21

## 2018-02-21 PROBLEM — E53.8 VITAMIN B12 DEFICIENCY: Status: RESOLVED | Noted: 2017-02-08 | Resolved: 2018-02-21

## 2018-02-21 PROBLEM — R00.1 BRADYCARDIA: Status: RESOLVED | Noted: 2017-11-13 | Resolved: 2018-02-21

## 2018-02-21 PROCEDURE — 99203 OFFICE O/P NEW LOW 30 MIN: CPT | Performed by: PHYSICIAN ASSISTANT

## 2018-02-21 NOTE — PROGRESS NOTES
Subjective   Bushra Vaz is a 72 y.o. female  Establish Care (New patient establish care, previous PCP Domitila Chavez)      History of Present Illness  Patient comes in today to establish care in our practice as a new patient.  She was a patient at Dr. Cherry's office but always saw Domitila.  Her  started coming here as a patient.  She has a fairly benign past medical history with exception of a diagnosis of uterine cancer in 2010 when she lived in [facility].  She had a total hysterectomy and BSO.  She also had a colonoscopy approximate 5 years ago that is due to be repeated.  She has a family history with a sister that was diagnosed with an survived colon cancer.  She denies he problems today.  She is due for mammogram.  She would like to establish care with a gynecologist follow-up on her history of uterine cancer.  She has history of hyperlipidemia that she treats with diet and exercise, no history of CVA, MI or TIAs.  The following portions of the patient's history were reviewed and updated as appropriate: allergies, current medications, past social history and problem list    Review of Systems   Constitutional: Negative for fatigue and unexpected weight change.   Respiratory: Negative for cough, chest tightness and shortness of breath.    Cardiovascular: Negative for chest pain, palpitations and leg swelling.   Gastrointestinal: Negative.  Negative for nausea.   Genitourinary: Negative.    Skin: Negative for color change and rash.   Neurological: Negative for dizziness, syncope, weakness and headaches.       Objective     Vitals:    02/21/18 1302   BP: 134/82   Pulse: 78   Temp: 98.1 °F (36.7 °C)   SpO2: 99%       Physical Exam   Constitutional: She appears well-developed and well-nourished. No distress.   Neck: No JVD present.   Cardiovascular: Normal rate, regular rhythm, normal heart sounds and intact distal pulses.    No murmur heard.  Pulmonary/Chest: Effort normal and breath  sounds normal. No respiratory distress. She exhibits no tenderness.   Abdominal: Soft. She exhibits no distension. There is no tenderness.   Musculoskeletal: She exhibits no edema.   Skin: Skin is warm and dry. She is not diaphoretic. No erythema. No pallor.   Psychiatric: She has a normal mood and affect.   Nursing note and vitals reviewed.      Assessment/Plan     Diagnoses and all orders for this visit:    Mixed hyperlipidemia    History of uterine cancer  -     Ambulatory Referral to Gynecology    Breast cancer screening  -     Mammo Screening Digital Tomosynthesis Bilateral With CAD; Future    Colon cancer screening  -     Ambulatory Referral to Colorectal Surgery    Advised patient to follow an improved diet higher in fruits and vegetables, low were in titrated sweets and lower in fats.  Advised increased exercise.  Follow up annually and as needed.

## 2018-03-30 ENCOUNTER — APPOINTMENT (OUTPATIENT)
Dept: MAMMOGRAPHY | Facility: HOSPITAL | Age: 73
End: 2018-03-30

## 2018-04-05 ENCOUNTER — OFFICE VISIT (OUTPATIENT)
Dept: OBSTETRICS AND GYNECOLOGY | Facility: CLINIC | Age: 73
End: 2018-04-05

## 2018-04-05 VITALS
WEIGHT: 200 LBS | HEIGHT: 55 IN | DIASTOLIC BLOOD PRESSURE: 70 MMHG | BODY MASS INDEX: 46.29 KG/M2 | SYSTOLIC BLOOD PRESSURE: 124 MMHG

## 2018-04-05 DIAGNOSIS — Z85.42 HISTORY OF UTERINE CANCER: Primary | ICD-10-CM

## 2018-04-05 DIAGNOSIS — C54.1 MALIGNANT NEOPLASM OF ENDOMETRIUM (HCC): ICD-10-CM

## 2018-04-05 PROCEDURE — G0101 CA SCREEN;PELVIC/BREAST EXAM: HCPCS | Performed by: OBSTETRICS & GYNECOLOGY

## 2018-04-09 PROBLEM — Z90.710 HX OF TOTAL HYSTERECTOMY: Status: ACTIVE | Noted: 2018-04-09

## 2018-04-09 NOTE — PROGRESS NOTES
"Subjective   Chief Complaint   Patient presents with   • Annual Exam     CHAPARRITANITHYA Vaz is a 72 y.o. year old No obstetric history on file. menopausal female presenting to be seen for her annual exam.  There has not been vaginal bleeding in the last 12 months.  Hot flashes and night sweats are not a significant problem.    She had been living in [place] for 15 years and has moved back to [place] about 18 months ago.  She was born in [place].  Her  is a retired .  2010 showing a hysterectomy for endometrial cancer.  I'm not sure if this is carcinomatosis that she mentioned.  She is given a try to sign a release so I can find out exactly all of the surgical procedures  CEA numbers which she reported chemotherapy for 6 months and then PET scan's for 3 years.  History of colon cancer in her sister and she has a colonoscopy scheduled herself.  Her mammogram was rescheduled for May 2.    SEXUAL Hx:  She is not sexually active.  Vaginal dryness is not a problem.    HEALTH Hx:  She exercises regularly: no.  She wears her seat belt:yes.  She has concerns about domestic violence: no.  She has noticed changes in height: no.              Calcium intake is adequate    The following portions of the patient's history were reviewed and updated as appropriate:problem list, current medications, allergies, past family history, past medical history, past social history and past surgical history.    Smoking status: Former Smoker                                                              Packs/day: 0.20      Years: 0.00         Types: Cigarettes     Quit date: 1980  Smokeless tobacco: Never Used                      Comment: smoked socially as young adult    Review of Systems   Her bowels and bladder are normal     Objective   /70   Ht 64.5 cm (25.39\")   Wt 90.7 kg (200 lb)   .06 kg/m²      General:  well developed; well nourished  no acute distress  appears stated age   Skin: "  No suspicious lesions seen   Thyroid: not examined   Breasts:  Examined in supine position  Symmetric without masses or skin dimpling  Nipples normal without inversion, lesions or discharge  There are no palpable axillary nodes  There appears to be discoloration under the left more so than the right breast consistent with tinea versicolor.   Abdomen: soft, non-tender; no masses  no umbilical or inginual hernias are present  no hepato-splenomegaly   Pelvis: Clinical staff was present for exam  External genitalia:  normal appearance of the external genitalia including Bartholin's and Cannelton's glands.  :  urethral meatus normal;  Vaginal:  atrophic mucosal changes are present; Slight relaxation and general.  Pap taken of the cuff.  Uterus:  absent.  Adnexa:  non palpable bilaterally.  Rectal:  digital rectal exam not performed; anus visually normal appearing.        Assessment   1. Normal postmenopausal female status post hysterectomy for uterine/endometrial cancer 2010.  I'll like to get results of Pap all G along with a lab test and PET scan she's had she reports she had 3 of those postop.  Questionably carcinomatosis.     Plan   1. Calcium discussed  1200 mg daily in divided doses ideally in diet  2. Regular weight bearing exercise  3. Breast self awareness, mammograms discussed - scheduled May 2  4. Colonoscopy scheduled due to family history her sister  5. Signed release of information regarding her pathology surgery etc. concerning endometrial cancer.    No orders of the defined types were placed in this encounter.          This note was electronically signed.    Luis A Gallego M.D.  April 9, 2018

## 2018-04-17 ENCOUNTER — HOSPITAL ENCOUNTER (OUTPATIENT)
Dept: MAMMOGRAPHY | Facility: HOSPITAL | Age: 73
Discharge: HOME | End: 2018-04-17
Admitting: PHYSICIAN ASSISTANT

## 2018-04-17 DIAGNOSIS — Z12.39 BREAST CANCER SCREENING: ICD-10-CM

## 2018-04-17 PROCEDURE — 77067 SCR MAMMO BI INCL CAD: CPT

## 2018-04-17 PROCEDURE — 77067 SCR MAMMO BI INCL CAD: CPT | Performed by: RADIOLOGY

## 2018-04-17 PROCEDURE — 77063 BREAST TOMOSYNTHESIS BI: CPT

## 2018-04-17 PROCEDURE — 77063 BREAST TOMOSYNTHESIS BI: CPT | Performed by: RADIOLOGY

## 2018-05-02 ENCOUNTER — APPOINTMENT (OUTPATIENT)
Dept: MAMMOGRAPHY | Facility: HOSPITAL | Age: 73
End: 2018-05-02

## 2018-05-16 ENCOUNTER — TELEPHONE (OUTPATIENT)
Dept: OBSTETRICS AND GYNECOLOGY | Facility: CLINIC | Age: 73
End: 2018-05-16

## 2018-05-16 PROBLEM — C54.1 MALIGNANT NEOPLASM OF ENDOMETRIUM (HCC): Status: ACTIVE | Noted: 2017-02-06

## 2018-05-16 NOTE — TELEPHONE ENCOUNTER
I spoke with Bushra regarding review of her 2010 op note and path report.  I actually had run into Dr. Haley today and briefly reviewed her situation.  He does not think that anything else needs to be done 8 years after the fact.  She reports she received 6 doses or courses of chemotherapy.  Currently she is asymptomatic and her Pap test was normal although given a endometrial carcinoma that may not show anything status post hysterectomy.  She voices understanding will let us know if anything changes or she become symptomatic.

## 2019-02-12 ENCOUNTER — OFFICE VISIT (OUTPATIENT)
Dept: FAMILY MEDICINE CLINIC | Facility: CLINIC | Age: 74
End: 2019-02-12

## 2019-02-12 ENCOUNTER — HOSPITAL ENCOUNTER (OUTPATIENT)
Dept: GENERAL RADIOLOGY | Facility: HOSPITAL | Age: 74
Discharge: HOME | End: 2019-02-12
Admitting: PHYSICIAN ASSISTANT

## 2019-02-12 VITALS
OXYGEN SATURATION: 97 % | DIASTOLIC BLOOD PRESSURE: 70 MMHG | BODY MASS INDEX: 33.15 KG/M2 | WEIGHT: 199 LBS | HEART RATE: 78 BPM | TEMPERATURE: 98.2 F | SYSTOLIC BLOOD PRESSURE: 138 MMHG | HEIGHT: 65 IN

## 2019-02-12 DIAGNOSIS — M54.42 CHRONIC LEFT-SIDED LOW BACK PAIN WITH LEFT-SIDED SCIATICA: ICD-10-CM

## 2019-02-12 DIAGNOSIS — J06.9 ACUTE URI: Primary | ICD-10-CM

## 2019-02-12 DIAGNOSIS — R10.32 GROIN PAIN, LEFT: ICD-10-CM

## 2019-02-12 DIAGNOSIS — G89.29 CHRONIC LEFT-SIDED LOW BACK PAIN WITH LEFT-SIDED SCIATICA: ICD-10-CM

## 2019-02-12 PROCEDURE — 99213 OFFICE O/P EST LOW 20 MIN: CPT | Performed by: PHYSICIAN ASSISTANT

## 2019-02-12 PROCEDURE — 72110 X-RAY EXAM L-2 SPINE 4/>VWS: CPT

## 2019-02-12 RX ORDER — DEXTROMETHORPHAN HYDROBROMIDE AND PROMETHAZINE HYDROCHLORIDE 15; 6.25 MG/5ML; MG/5ML
5 SYRUP ORAL 4 TIMES DAILY PRN
Qty: 120 ML | Refills: 0 | Status: SHIPPED | OUTPATIENT
Start: 2019-02-12 | End: 2019-03-06

## 2019-02-12 RX ORDER — AZITHROMYCIN 250 MG/1
TABLET, FILM COATED ORAL
Qty: 6 TABLET | Refills: 0 | Status: SHIPPED | OUTPATIENT
Start: 2019-02-12 | End: 2019-03-06

## 2019-02-12 NOTE — PROGRESS NOTES
Subjective   Bushra Vaz is a 73 y.o. female  Sore Throat (sore throat x3 days ) and Cough (productive cough with green mucus x3 days)      History of Present Illness  Patient comes in today for evaluation of 2 separate issues.  She states that she's been feeling sick with a sore throat and cough productive of green mucus for the past 3 days.  She had chills on Saturday but no fever.  She states the cough is keeping her up at night.  Additionally she's been experiencing pain in her groin on the left side into her inner thigh radiating to her back for the last couple of months.  No history of trauma.  The following portions of the patient's history were reviewed and updated as appropriate: allergies, current medications, past social history and problem list    Review of Systems   Constitutional: Negative for fever.   HENT: Positive for congestion, postnasal drip, rhinorrhea, sneezing, sore throat and voice change. Negative for sinus pressure.    Respiratory: Positive for cough.    Musculoskeletal: Positive for back pain and myalgias. Negative for gait problem.   Neurological: Negative for tremors, weakness and numbness.       Objective     Vitals:    02/12/19 1305   BP: 138/70   Pulse: 78   Temp: 98.2 °F (36.8 °C)   SpO2: 97%       Physical Exam   Constitutional: She is oriented to person, place, and time. She appears well-developed and well-nourished. No distress.   HENT:   Head: Normocephalic and atraumatic.   Right Ear: Tympanic membrane and ear canal normal.   Left Ear: Tympanic membrane and ear canal normal.   Nose: Mucosal edema, rhinorrhea and sinus tenderness present. Right sinus exhibits maxillary sinus tenderness and frontal sinus tenderness. Left sinus exhibits maxillary sinus tenderness and frontal sinus tenderness.   Mouth/Throat: Oropharynx is clear and moist. No oropharyngeal exudate.   Eyes: Pupils are equal, round, and reactive to light.   Cardiovascular: Normal rate, regular rhythm and normal  heart sounds.   Pulmonary/Chest: Effort normal and breath sounds normal.   Abdominal: Soft. Bowel sounds are normal. She exhibits no distension and no mass. There is no tenderness.   Musculoskeletal: She exhibits no tenderness ( groin/hip nontender).        Lumbar back: She exhibits decreased range of motion, bony tenderness and pain. She exhibits no swelling, no deformity and no spasm.   Neurological: She is alert and oriented to person, place, and time. She has normal reflexes. No cranial nerve deficit or sensory deficit. She exhibits normal muscle tone. Coordination normal.   Skin: She is not diaphoretic.   Nursing note and vitals reviewed.      Assessment/Plan     Diagnoses and all orders for this visit:    Acute URI    Groin pain, left    Chronic left-sided low back pain with left-sided sciatica  -     XR Spine Lumbar 4+ View    Other orders  -     azithromycin (ZITHROMAX Z-SAMMIE) 250 MG tablet; Take 2 tablets the first day, then 1 tablet daily for 4 days.  -     promethazine-dextromethorphan (PROMETHAZINE-DM) 6.25-15 MG/5ML syrup; Take 5 mL by mouth 4 (Four) Times a Day As Needed for Cough.

## 2019-02-14 ENCOUNTER — TELEPHONE (OUTPATIENT)
Dept: FAMILY MEDICINE CLINIC | Facility: CLINIC | Age: 74
End: 2019-02-14

## 2019-02-14 ENCOUNTER — TRANSCRIBE ORDERS (OUTPATIENT)
Dept: FAMILY MEDICINE CLINIC | Facility: CLINIC | Age: 74
End: 2019-02-14

## 2019-02-14 DIAGNOSIS — M25.552 BILATERAL HIP PAIN: Primary | ICD-10-CM

## 2019-02-14 DIAGNOSIS — M25.551 BILATERAL HIP PAIN: Primary | ICD-10-CM

## 2019-02-14 NOTE — TELEPHONE ENCOUNTER
----- Message from Tasneem So sent at 2/14/2019  8:57 AM EST -----  Contact: PT.  PT. SAW MAYELA ON Tuesday, TOLD TO CALL BACK OFFICE IN 2 DAYS, RE. TEST RESULTS ON XRAY OF BACK, DONE @ [facility]  OUT[facility]IENT [facility].    PT. CAN BE REACHED @ [facility] #.

## 2019-03-06 ENCOUNTER — OFFICE VISIT (OUTPATIENT)
Dept: ORTHOPEDIC SURGERY | Facility: CLINIC | Age: 74
End: 2019-03-06

## 2019-03-06 VITALS — BODY MASS INDEX: 31.81 KG/M2 | HEART RATE: 72 BPM | HEIGHT: 65 IN | WEIGHT: 190.92 LBS | OXYGEN SATURATION: 98 %

## 2019-03-06 DIAGNOSIS — M16.12 PRIMARY OSTEOARTHRITIS OF LEFT HIP: ICD-10-CM

## 2019-03-06 DIAGNOSIS — M70.62 TROCHANTERIC BURSITIS OF LEFT HIP: Primary | ICD-10-CM

## 2019-03-06 PROCEDURE — 20610 DRAIN/INJ JOINT/BURSA W/O US: CPT | Performed by: ORTHOPAEDIC SURGERY

## 2019-03-06 PROCEDURE — 99203 OFFICE O/P NEW LOW 30 MIN: CPT | Performed by: ORTHOPAEDIC SURGERY

## 2019-03-06 RX ORDER — TRIAMCINOLONE ACETONIDE 40 MG/ML
40 INJECTION, SUSPENSION INTRA-ARTICULAR; INTRAMUSCULAR
Status: COMPLETED | OUTPATIENT
Start: 2019-03-06 | End: 2019-03-06

## 2019-03-06 RX ORDER — ROPIVACAINE HYDROCHLORIDE 5 MG/ML
4 INJECTION, SOLUTION EPIDURAL; INFILTRATION; PERINEURAL
Status: COMPLETED | OUTPATIENT
Start: 2019-03-06 | End: 2019-03-06

## 2019-03-06 RX ADMIN — TRIAMCINOLONE ACETONIDE 40 MG: 40 INJECTION, SUSPENSION INTRA-ARTICULAR; INTRAMUSCULAR at 09:27

## 2019-03-06 RX ADMIN — ROPIVACAINE HYDROCHLORIDE 4 ML: 5 INJECTION, SOLUTION EPIDURAL; INFILTRATION; PERINEURAL at 09:27

## 2019-03-06 NOTE — PROGRESS NOTES
[facility] Note    Subjective     Chief Complaint   Patient presents with   • Left Hip - Pain        HPI    Bushra Vaz is a 73 y.o. female.  She presents today for evaluation of left hip pain.  She has had pain for 2-3 months, following a particular injury.  The pain is worse with walking.  Pain is currently 3 out of 10, primarily located on the lateral aspect of the hip currently.  She was having some groin pain when it first started.  The groin pain has improved.  She has tried anti-inflammatories such as ibuprofen without long-term benefit.  No previous physical therapy.      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   • Non morbid obesity   • Family history of heart disease   • Vaginal atrophy   • Hx of total hysterectomy, BSO, appendix 2010     Past Medical History:   Diagnosis Date   • Colon polyp     per colonoscopy 7/17/13   • Diverticulosis     per colonoscopy 7/17/13   • Drug therapy 2010    uterine cancer   • Hyperlipidemia    • Internal hemorrhoid     per colonoscopy 7/17/13   • Osteoarthritis    • Uterine cancer (CMS/HCC)     2010   • Vitamin B12 deficiency    • Vitamin D deficiency       Past Surgical History:   Procedure Laterality Date   • APPENDECTOMY  2010    during hysterectomy   • BREAST EXCISIONAL BIOPSY Right    • CATARACT EXTRACTION, BILATERAL  2002   • OMENTECTOMY  2010    with TRINA/BSO - uterine cancer   • REPLACEMENT TOTAL KNEE BILATERAL Bilateral 2012   • TOTAL ABDOMINAL HYSTERECTOMY WITH SALPINGO OOPHORECTOMY  2010    uterine cancer; 6 chemotherapy treatments      Family History   Problem Relation Age of Onset   • Lymphoma Mother         non-hodgkins   • Heart attack Father 70   • Colon cancer Sister 55   • Stomach cancer Brother 78        non-smoker   • Diabetes Sister         diet controlled   • Stroke Neg Hx    • Thyroid disease Neg Hx    • Breast cancer Neg Hx     • Ovarian cancer Neg Hx      Social History     Socioeconomic History   • Marital status:      Spouse name: Not on file   • Number of children: Not on file   • Years of education: Not on file   • Highest education level: Not on file   Social Needs   • Financial resource strain: Not on file   • Food insecurity - worry: Not on file   • Food insecurity - inability: Not on file   • Transportation needs - medical: Not on file   • Transportation needs - non-medical: Not on file   Occupational History   • Occupation: Retired   Tobacco Use   • Smoking status: Former Smoker     Packs/day: 0.20     Types: Cigarettes     Last attempt to quit: 1980     Years since quittin.2   • Smokeless tobacco: Never Used   • Tobacco comment: smoked socially as young adult   Substance and Sexual Activity   • Alcohol use: Yes     Comment: 2-3 drinks weekly   • Drug use: No   • Sexual activity: Yes   Other Topics Concern   • Not on file   Social History Narrative    Domestic life : Lives with         Spiritism : Faith        Sleep hygiene : 7-8 hours/night        Caffeine use : 1 serving of diet coke or tea daily        Exercise habits : Walks 30 minutes daily        Diet : Well-balanced, but loves sweets        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     • aspirin 81 MG tablet Take 1 tablet by mouth Daily.     • [DISCONTINUED] azithromycin (ZITHROMAX Z-SAMMIE) 250 MG tablet Take 2 tablets the first day, then 1 tablet daily for 4 days. 6 tablet 0   • [DISCONTINUED] promethazine-dextromethorphan (PROMETHAZINE-DM) 6.25-15 MG/5ML syrup Take 5 mL by mouth 4 (Four) Times a Day As Needed for Cough. 120 mL 0      No current facility-administered medications on file prior to visit.       Allergies   Allergen Reactions   • Atorvastatin      Joint pains   • Pravastatin Sodium      Ankle pain        Review of Systems   Constitutional: Negative for activity change, appetite change, chills, diaphoresis, fatigue, fever and unexpected weight change.   HENT: Negative for congestion, dental problem, drooling, ear discharge, ear pain, facial swelling, hearing loss, mouth sores, nosebleeds, postnasal drip, rhinorrhea, sinus pressure, sneezing, sore throat, tinnitus, trouble swallowing and voice change.    Eyes: Negative for photophobia, pain, discharge, redness, itching and visual disturbance.   Respiratory: Negative for apnea, cough, choking, chest tightness, shortness of breath, wheezing and stridor.    Cardiovascular: Negative for chest pain, palpitations and leg swelling.   Gastrointestinal: Negative for abdominal distention, abdominal pain, anal bleeding, blood in stool, constipation, diarrhea, nausea, rectal pain and vomiting.   Endocrine: Negative for cold intolerance, heat intolerance, polydipsia, polyphagia and polyuria.   Genitourinary: Negative for decreased urine volume, difficulty urinating, dysuria, enuresis, flank pain, frequency, genital sores, hematuria and urgency.   Musculoskeletal: Negative for arthralgias, back pain, gait problem, joint swelling, myalgias, neck pain and neck stiffness.        Left hip pain    Skin: Negative for color change, pallor, rash and wound.   Allergic/Immunologic: Negative for environmental allergies, food allergies and immunocompromised state.   Neurological: Negative for dizziness, tremors, seizures, syncope, facial asymmetry, speech difficulty, weakness, light-headedness, numbness and headaches.   Hematological: Negative for adenopathy. Does not bruise/bleed easily.   Psychiatric/Behavioral: Negative for agitation, behavioral problems, confusion, decreased concentration, dysphoric  "mood, hallucinations, self-injury, sleep disturbance and suicidal ideas. The patient is not nervous/anxious and is not hyperactive.         Objective      Physical Exam  Pulse 72   Ht 165.1 cm (65\")   Wt 86.6 kg (190 lb 14.7 oz)   SpO2 98%   Breastfeeding? No   BMI 31.77 kg/m²     Body mass index is 31.77 kg/m².    General:   Mental Status:  Alert   Appearance: Cooperative, in no acute distress   Build and Nutrition: Well-nourished and well developed female   Orientation: Alert and oriented to person, place and time   Posture: Normal   Gait: Normal    Integument:   Left hip: No skin lesions, no rash, no ecchymosis    Neurologic:   Sensation:    Left foot: Intact to light touch on the dorsal and plantar aspect   Motor:  Left lower extremity: 5/5 quadriceps, hamstrings, ankle dorsiflexors, and ankle plantar flexors  Vascular:   Left lower extremity: 2+ dorsalis pedis pulse, prompt capillary refill    Lower Extremity:   Left Hip:    Tenderness:  Over the lateral aspect of the hip    Swelling:  None    Crepitus:  None    Atrophy:  None    Range of motion:  External Rotation: 30°       Internal Rotation: 30°       Flexion:  100°       Extension:  0°   Instability:  None  Deformities:  None  Functional testing: Negative Stinchfield    No leg length discrepancy        Imaging/Studies  Imaging Results (last 24 hours)     Procedure Component Value Units Date/Time    XR Hip With or Without Pelvis 1 View Left [[id number]] Resulted:  03/06/19 0905     Updated:  03/06/19 0906    Narrative:       Left Hip Radiographs  Indication: left hip pain  Views: low AP pelvis and lateral of the left hip    Comparison: no prior studies available for review    Findings:   Arthritic changes are appreciated in the left hip joint, with joint space   narrowing, acetabular irregularity, mild thickening of the femoral neck,   with no acute bony abnormalities.            Assessment and Plan     Bushra was seen today for pain.    Diagnoses and all " orders for this visit:    Trochanteric bursitis of left hip  -     XR Hip With or Without Pelvis 1 View Left  -     Ambulatory Referral to Physical Therapy Evaluate and treat  -     Large Joint Arthrocentesis: L greater trochanteric bursa    Primary osteoarthritis of left hip        I reviewed my findings with the patient today.  She does have an element of hip arthritis radiographically, but most of her pain is on the lateral aspect of the hip.  She also has some posterior pain.  That could be coming from her back.  However, the lateral pain will be addressed today with a trial of a trochanteric injection and physical therapy.  I will see her back in 2 months, but sooner for any problems.  If her pain more localizes to the groin in the future, we may initiate further treatment for that also.    Of note, she had 50% improvement just a few minutes following the injection.    Return in about 2 months (around 5/6/2019).      Medical Decision Making  Management Options : prescription/IM medicine and physical/occupational therapy  Data/Risk: radiology tests and independent visualization of imaging, lab tests, or EMG/NCV      Antonio Oviedo MD  03/06/19  9:58 AM

## 2019-03-06 NOTE — PROGRESS NOTES
Procedure   Large Joint Arthrocentesis: L greater trochanteric bursa  Date/Time: 3/6/2019 9:27 AM  Consent given by: patient  Site marked: site marked  Timeout: Immediately prior to procedure a time out was called to verify the correct patient, procedure, equipment, support staff and site/side marked as required   Supporting Documentation  Indications: pain   Procedure Details  Location: hip - L greater trochanteric bursa  Preparation: Patient was prepped and draped in the usual sterile fashion  Needle size: 22 G  Approach: anterolateral  Medications administered: 4 mL ropivacaine 0.5 %; 40 mg triamcinolone acetonide 40 MG/ML  Patient tolerance: patient tolerated the procedure well with no immediate complications

## 2019-03-18 ENCOUNTER — TRANSCRIBE ORDERS (OUTPATIENT)
Dept: ADMINISTRATIVE | Facility: HOSPITAL | Age: 74
End: 2019-03-18

## 2019-03-18 DIAGNOSIS — Z12.31 VISIT FOR SCREENING MAMMOGRAM: Primary | ICD-10-CM

## 2019-04-16 ENCOUNTER — OFFICE VISIT (OUTPATIENT)
Dept: OBSTETRICS AND GYNECOLOGY | Facility: CLINIC | Age: 74
End: 2019-04-16

## 2019-04-16 VITALS
BODY MASS INDEX: 31.5 KG/M2 | SYSTOLIC BLOOD PRESSURE: 124 MMHG | DIASTOLIC BLOOD PRESSURE: 80 MMHG | WEIGHT: 196 LBS | HEIGHT: 66 IN

## 2019-04-16 DIAGNOSIS — C54.1 MALIGNANT NEOPLASM OF ENDOMETRIUM (HCC): ICD-10-CM

## 2019-04-16 DIAGNOSIS — M81.8 OTHER OSTEOPOROSIS WITHOUT CURRENT PATHOLOGICAL FRACTURE: ICD-10-CM

## 2019-04-16 DIAGNOSIS — N95.2 VAGINAL ATROPHY: Primary | ICD-10-CM

## 2019-04-16 PROBLEM — M81.0 OSTEOPOROSIS: Status: ACTIVE | Noted: 2019-04-16

## 2019-04-16 PROBLEM — Z80.0 FAMILY HISTORY OF COLON CANCER: Status: ACTIVE | Noted: 2019-04-16

## 2019-04-16 PROBLEM — Z90.710 HX OF TOTAL HYSTERECTOMY: Status: RESOLVED | Noted: 2018-04-09 | Resolved: 2019-04-16

## 2019-04-16 PROCEDURE — 99213 OFFICE O/P EST LOW 20 MIN: CPT | Performed by: OBSTETRICS & GYNECOLOGY

## 2019-04-16 NOTE — PROGRESS NOTES
DataSubjective   Chief Complaint   Patient presents with   • Hyperlipidemia     Bushra Vaz is a 73 y.o. year old No obstetric history on file. who is post-menopausal.  She is S/P hysterectomy presenting to be seen for her annual exam.  This past year she has not been on hormone replacement therapy.  There has not been vaginal bleeding in the last 12 months.  Menopausal symptoms are not present.    SEXUAL Hx:  She is currently sexually active.    [place] is painful: yes - and OTC lubricants ARE NOT effective              She has concerns about domestic violence no    HEALTH Hx:  Level of weekly physical activity: she is busy yard etc  She wears her seat belt: yes.  Self breast awareness: yes  Calcium servings per day: 2  Caffeine intake:1 equivalent to a cup of coffee              Alcohol:regular (light)              Social History    Tobacco Use      Smoking status: Former Smoker        Packs/day: 0.20        Types: Cigarettes        Quit date:         Years since quittin.3      Smokeless tobacco: Never Used      Tobacco comment: smoked socially as young adult      The following portions of the patient's history were reviewed and updated as appropriate:problem list, current medications, allergies, past family history, past medical history, past social history and past surgical history    Current Outpatient Medications:   •  acetaminophen (TYLENOL) 500 MG tablet, Take 500 mg by mouth Every 6 (Six) Hours As Needed for mild pain (1-3) (pain)., Disp: , Rfl:     Review of Systems  Constitutional POS: left groin and hip pain - saw Dr Preet angelo no help    NEG: anorexia, chills or night sweats   Genitourinary POS: urgency    NEG: dysuria, frequency or hematuria   Gastointestinal POS: nothing reported    NEG: bloating, change in bowel habits, melena or reflux symptoms   Integument POS: yeast under breast left> right    NEG: moles that are changing in size, shape, color   Breast POS: nothing reported     "NEG: persistent breast lump, skin dimpling or nipple discharge        Objective   /80   Ht 166.4 cm (65.5\")   Wt 88.9 kg (196 lb)   Breastfeeding? No   BMI 32.12 kg/m²     General:  well developed; well nourished  no acute distress  appears stated age   Skin:  No suspicious lesions seen  red rash under breasts   Thyroid: not examined   Breasts:  Examined in supine position  Symmetric without masses or skin dimpling  Nipples normal without inversion, lesions or discharge  There are no palpable axillary nodes   Abdomen: soft, non-tender; no masses  no umbilical or inguinal hernias are present  no hepato-splenomegaly   Pelvis: Clinical staff was present for exam  External genitalia:  normal appearance of the external genitalia including Bartholin's and [place]'s glands.  :  urethral meatus normal;  Vaginal:  atrophic mucosal changes are present; pH = 7  Uterus:  absent.  Adnexa:  non palpable bilaterally.       Lab and Imaging Review   Pap test  Mammogram report       Assessment   1. Post hysterectomy postmenopausal examination.  She had endometrial cancer 2010.  Doing well with no complaints.  Other than fact insurance does not cover Pap test but every 2 years.  2. Osteoporosis with an adequate exercise and adequate calcium intake will give calcium diet information and suggested supplement.  May need follow-up bone density study one years time  3. Relatively asymptomatic vaginal atrophy she is not interested in any sort of estrogen or [place] will try over-the-counter lubrications.  We will give her sample KY gel.  4. Mild yeast infection underneath left breast greater than right have suggested over-the-counter antifungal or even Selsun Blue at night  5. Gynecologic, breast and colorectal screening protocols were reviewed.  There is a note regarding normal colonoscopy 2018.  5-year plan due to her sister having colon cancer.       Plan   1. Increase exercise and calcium  2. The importance of keeping all " planned follow-up and taking all medications as prescribed was emphasized.  3. Self breast awareness and mammogram protocols discussed.  She has a mammogram scheduled in May  4. Follow up for annual exam or PRN     No orders of the defined types were placed in this encounter.    No orders of the defined types were placed in this encounter.         This note was electronically signed.    Luis A Gallego MD  April 16, 2019    Note: Speech recognition transcription software may have been used to create portions of this document.  An attempt at proofreading has been made but errors in transcription could still be present.

## 2019-05-02 ENCOUNTER — HOSPITAL ENCOUNTER (OUTPATIENT)
Dept: MAMMOGRAPHY | Facility: HOSPITAL | Age: 74
Discharge: HOME | End: 2019-05-02
Admitting: OBSTETRICS & GYNECOLOGY

## 2019-05-02 DIAGNOSIS — Z12.31 VISIT FOR SCREENING MAMMOGRAM: ICD-10-CM

## 2019-05-02 PROCEDURE — 77063 BREAST TOMOSYNTHESIS BI: CPT

## 2019-05-02 PROCEDURE — 77063 BREAST TOMOSYNTHESIS BI: CPT | Performed by: RADIOLOGY

## 2019-05-02 PROCEDURE — 77067 SCR MAMMO BI INCL CAD: CPT

## 2019-05-02 PROCEDURE — 77067 SCR MAMMO BI INCL CAD: CPT | Performed by: RADIOLOGY

## 2020-01-23 ENCOUNTER — OFFICE VISIT (OUTPATIENT)
Dept: FAMILY MEDICINE CLINIC | Facility: CLINIC | Age: 75
End: 2020-01-23

## 2020-01-23 VITALS
TEMPERATURE: 98.3 F | HEART RATE: 81 BPM | BODY MASS INDEX: 32.11 KG/M2 | RESPIRATION RATE: 18 BRPM | HEIGHT: 66 IN | OXYGEN SATURATION: 98 % | WEIGHT: 199.8 LBS

## 2020-01-23 DIAGNOSIS — R05.9 COUGH: ICD-10-CM

## 2020-01-23 DIAGNOSIS — J01.90 ACUTE SINUSITIS, RECURRENCE NOT SPECIFIED, UNSPECIFIED LOCATION: Primary | ICD-10-CM

## 2020-01-23 PROCEDURE — 99213 OFFICE O/P EST LOW 20 MIN: CPT | Performed by: FAMILY MEDICINE

## 2020-01-23 RX ORDER — CEFDINIR 300 MG/1
300 CAPSULE ORAL 2 TIMES DAILY
Qty: 20 CAPSULE | Refills: 0 | Status: SHIPPED | OUTPATIENT
Start: 2020-01-23 | End: 2020-07-06

## 2020-01-23 RX ORDER — GUAIFENESIN AND CODEINE PHOSPHATE 100; 10 MG/5ML; MG/5ML
5 SOLUTION ORAL 4 TIMES DAILY PRN
Qty: 240 ML | Refills: 0 | Status: SHIPPED | OUTPATIENT
Start: 2020-01-23 | End: 2020-07-06

## 2020-01-23 NOTE — PROGRESS NOTES
Subjective   Bushra Vaz is a 74 y.o. female    Chief Complaint  Nasal congestion  Sinus congestion and drainage  Cough    History of Present Illness  Patient presents with 2-week history of nasal and sinus congestion with mucopurulent drainage including blood streaking that is persisted.  Cough is more of an irritative cough worse at night that is nonproductive.  Patient denies fever or chills, myalgias or arthralgias.  Leaving for trip to [place] soon to visit kids and grandkids.    The following portions of the patient's history were reviewed and updated as appropriate: allergies, current medications, past social history and problem list    Review of Systems   Constitutional: Negative for chills, fatigue and fever.   HENT: Positive for congestion, ear pain, postnasal drip, rhinorrhea and sinus pressure. Negative for sore throat, trouble swallowing and voice change.    Eyes: Negative for pain and visual disturbance.   Respiratory: Positive for cough. Negative for shortness of breath.    Musculoskeletal: Negative for arthralgias and myalgias.   Neurological: Positive for headaches. Negative for dizziness.   Hematological: Negative for adenopathy.       Objective     Vitals:    01/23/20 1548   Pulse: 81   Resp: 18   Temp: 98.3 °F (36.8 °C)   SpO2: 98%       Physical Exam   Constitutional: She is oriented to person, place, and time. She appears well-developed and well-nourished.   HENT:   Head: Normocephalic and atraumatic.   Right Ear: Tympanic membrane and ear canal normal.   Left Ear: Tympanic membrane and ear canal normal.   Nose: Mucosal edema, rhinorrhea and sinus tenderness present. Right sinus exhibits maxillary sinus tenderness and frontal sinus tenderness. Left sinus exhibits maxillary sinus tenderness and frontal sinus tenderness.   Mouth/Throat: Oropharynx is clear and moist. No oropharyngeal exudate.   Eyes: Pupils are equal, round, and reactive to light. Conjunctivae are normal.   Neck: Neck supple.    Cardiovascular: Normal rate and regular rhythm.   Pulmonary/Chest: Effort normal and breath sounds normal.   Lymphadenopathy:     She has no cervical adenopathy.   Neurological: She is alert and oriented to person, place, and time.   Nursing note and vitals reviewed.      Assessment/Plan   Problem List Items Addressed This Visit     None      Visit Diagnoses     Acute sinusitis, recurrence not specified, unspecified location    -  Primary    Relevant Medications    cefdinir (OMNICEF) 300 MG capsule    Cough        Relevant Medications    guaiFENesin-codeine (GUAIFENESIN AC) 100-10 MG/5ML liquid

## 2020-03-18 DIAGNOSIS — J01.90 ACUTE SINUSITIS, RECURRENCE NOT SPECIFIED, UNSPECIFIED LOCATION: ICD-10-CM

## 2020-03-18 RX ORDER — CEFDINIR 300 MG/1
300 CAPSULE ORAL 2 TIMES DAILY
Qty: 20 CAPSULE | Refills: 0 | Status: CANCELLED | OUTPATIENT
Start: 2020-03-18

## 2020-03-18 NOTE — TELEPHONE ENCOUNTER
See if she is having clear or dark congestion, any sinus pressure or sore throat, any fever? She may just need a decongestant

## 2020-03-18 NOTE — TELEPHONE ENCOUNTER
PATIENT CALLED AND STATED SHE HAD TAKEN cefdinir (OMNICEF) 300 MG capsule. THE PATIENT STATES THAT HER EARS ARE FEELING CONGESTED AND STUFFY AGAIN. SHE WOULD LIKE FOR THIS TO BE SENT IN TO HER PHARMACY AGAIN IF POSSIBLE. THE PATIENT WOULD LIKE TO AVOID COMING IN. PLEASE ADVISE.     PHARMACY IS [address]     PATIENT CALL BACK [phone]

## 2020-05-28 ENCOUNTER — TRANSCRIBE ORDERS (OUTPATIENT)
Dept: ADMINISTRATIVE | Facility: HOSPITAL | Age: 75
End: 2020-05-28

## 2020-05-28 DIAGNOSIS — Z12.31 VISIT FOR SCREENING MAMMOGRAM: Primary | ICD-10-CM

## 2020-07-06 ENCOUNTER — OFFICE VISIT (OUTPATIENT)
Dept: OBSTETRICS AND GYNECOLOGY | Facility: CLINIC | Age: 75
End: 2020-07-06

## 2020-07-06 VITALS
HEIGHT: 65 IN | DIASTOLIC BLOOD PRESSURE: 80 MMHG | WEIGHT: 198 LBS | SYSTOLIC BLOOD PRESSURE: 126 MMHG | BODY MASS INDEX: 32.99 KG/M2

## 2020-07-06 DIAGNOSIS — M81.8 OTHER OSTEOPOROSIS WITHOUT CURRENT PATHOLOGICAL FRACTURE: ICD-10-CM

## 2020-07-06 DIAGNOSIS — Z01.411 ENCOUNTER FOR GYNECOLOGICAL EXAMINATION WITH ABNORMAL FINDING: Primary | ICD-10-CM

## 2020-07-06 PROCEDURE — G0101 CA SCREEN;PELVIC/BREAST EXAM: HCPCS | Performed by: OBSTETRICS & GYNECOLOGY

## 2020-07-06 NOTE — PROGRESS NOTES
DataSubjective   Chief Complaint   Patient presents with   • Annual Exam     Bushra Vaz is a 74 y.o. year old No obstetric history on file. who is post-menopausal.  She is S/P hysterectomy presenting to be seen for her annual exam.  This past year she has not been on hormone replacement therapy.  There has not been vaginal bleeding in the last 12 months.  Menopausal symptoms are not present.  Feels good and is busy works in the yard  She may have yeast underneath her breast otherwise no real complaints last year asked her to use Selsun Blue.  Probably for tenia versicolor  I discussed osteoporosis she is not on any medications does not even take calcium.  Discussed importance of calcium exercise and possibly other medications.  Last DEXA scan was in 2016 in [place] so I [place] update that here to be sure she does not need medication prevent hip fracture.    SEXUAL Hx:  She is not currently sexually active.  No interest; no problems  [place] is painful: not asked              She has concerns about domestic violence no    HEALTH Hx:  Level of weekly physical activity: 20 minutes  She wears her seat belt: yes.  Self breast awareness: yes  Calcium servings per day: 1  Caffeine intake:1+ equivalent to a cup of coffee  Social History     Substance and Sexual Activity   Alcohol Use Yes    Comment: 2-3 drinks weekly                 Social History    Tobacco Use      Smoking status: Former Smoker        Packs/day: 0.20        Types: Cigarettes        Quit date: 1980        Years since quittin.5      Smokeless tobacco: Never Used      Tobacco comment: smoked socially as young adult      The following portions of the patient's history were reviewed and updated as appropriate:problem list, current medications, allergies, past family history, past medical history, past social history and past surgical history  No current outpatient medications on file.    Review of Systems  Constitutional POS: nothing  "reported    NEG: anorexia or night sweats   Genitourinary POS: nothing reported    NEG: dysuria, frequency or hematuria   Gastointestinal POS: nothing reported    NEG: bloating, change in bowel habits, melena or reflux symptoms   Breast                       POS: itches underneath ? yeast  NEG: persistent breast lump, skin dimpling, breast tenderness or nipple discharge                    Objective   /80   Ht 165.7 cm (65.25\")   Wt 89.8 kg (198 lb)   Breastfeeding No   BMI 32.70 kg/m²     General:  well developed; well nourished  no acute distress  appears stated age   Skin:  No suspicious lesions seen; underneath her breast there is slight discoloration darker pigmented consistent with possible tenia versicolor left greater than right   Thyroid: not examined   Breasts:  Examined in supine position  Symmetric without masses or skin dimpling  Nipples normal without inversion, lesions or discharge  Fibrocystic changes are present both breasts without a discrete mass   Abdomen: soft, non-tender; no masses  no umbilical or inguinal hernias are present  no hepato-splenomegaly   Pelvis: Clinical staff was present for exam  External genitalia:  normal appearance of the external genitalia including Bartholin's and [place]'s glands.  :  urethral meatus normal;  Vaginal:  atrophic mucosal changes are present;  Cervix:  absent. Pap at cuff  Uterus:  absent.  Adnexa:  non palpable bilaterally.  Rectal:  digital rectal exam not performed; anus visually normal appearing.       Lab and Imaging Review   LIPIDS, Pap test, PATHOLOGY   and Vitamin D  DEXA  Mammogram report       Assessment   1. Normal post hysterectomy postmenopausal examination.  She had surgery in 2010 for stage IIIa adenocarcinoma of the endometrium.  Discussed that pretty much unknown exact frequency we should test her for an adenosquamous carcinoma of the endometrium would be different than the cervical cancer.  She would rather be safe than sorry.  " Discussed that may be a 3 or 5-year protocol after 10 years would be appropriate however Medicare only pays every 2 years for Pap testing.  2. History of osteoporosis needs DEXA scanning and encouraged calcium will give diet list along with once again encouraging regular exercise  3. Gynecologic, breast and colorectal screening protocols were reviewed.  She has a sister with colon cancer normal in 2018 at [facility]       Plan   1. Possibly get DEXA scan at the same time she has her mammogram scheduled in August 2. The importance of keeping all planned follow-up and taking all medications as prescribed was emphasized.  3. Self breast awareness and mammogram protocols discussed.  4. Regular exercise and calcium ( ideally dietary) discussed  5. Follow up for annual exam or PRN     No orders of the defined types were placed in this encounter.    Orders Placed This Encounter   Procedures   • DEXA Bone Density Axial     Standing Status:   Future     Standing Expiration Date:   7/6/2021     Order Specific Question:   Reason for Exam:     Answer:   Post-menopausal          This note was electronically signed.    Luis A Gallego MD  July 6, 2020    Note: Speech recognition transcription software may have been used to create portions of this document.  An attempt at proofreading has been made but errors in transcription could still be present.

## 2020-09-04 ENCOUNTER — APPOINTMENT (OUTPATIENT)
Dept: MAMMOGRAPHY | Facility: HOSPITAL | Age: 75
End: 2020-09-04

## 2020-09-25 ENCOUNTER — OFFICE VISIT (OUTPATIENT)
Dept: FAMILY MEDICINE CLINIC | Facility: CLINIC | Age: 75
End: 2020-09-25

## 2020-09-25 VITALS
WEIGHT: 198 LBS | DIASTOLIC BLOOD PRESSURE: 98 MMHG | TEMPERATURE: 96.9 F | HEART RATE: 83 BPM | HEIGHT: 65 IN | OXYGEN SATURATION: 99 % | BODY MASS INDEX: 32.99 KG/M2 | SYSTOLIC BLOOD PRESSURE: 140 MMHG

## 2020-09-25 DIAGNOSIS — Z23 IMMUNIZATION DUE: ICD-10-CM

## 2020-09-25 DIAGNOSIS — Z00.00 MEDICARE ANNUAL WELLNESS VISIT, SUBSEQUENT: Primary | ICD-10-CM

## 2020-09-25 DIAGNOSIS — R03.0 ELEVATED BLOOD PRESSURE READING: ICD-10-CM

## 2020-09-25 DIAGNOSIS — E78.2 MIXED HYPERLIPIDEMIA: ICD-10-CM

## 2020-09-25 DIAGNOSIS — J02.9 SORE THROAT: ICD-10-CM

## 2020-09-25 PROCEDURE — G0439 PPPS, SUBSEQ VISIT: HCPCS | Performed by: FAMILY MEDICINE

## 2020-09-25 NOTE — PROGRESS NOTES
The ABCs of the Annual Wellness Visit  Subsequent Medicare Wellness Visit    Chief Complaint   Patient presents with   • Medicare Wellness-subsequent     Pt is not fasting this morning   • Hyperlipidemia       Subjective   History of Present Illness:  Bushra Vaz is a 74 y.o. female who presents for a Subsequent Medicare Wellness Visit.    HEALTH RISK ASSESSMENT    Recent Hospitalizations:  No hospitalization(s) within the last year.    Current Medical Providers:  Patient Care Team:  Marian Bowden PA-C as PCP - General (Family Medicine)  Noah English MD as PCP - Claims Attributed    Smoking Status:  Social History     Tobacco Use   Smoking Status Former Smoker   • Packs/day: 0.20   • Types: Cigarettes   • Quit date:    • Years since quittin.7   Smokeless Tobacco Never Used   Tobacco Comment    smoked socially as young adult       Alcohol Consumption:  Social History     Substance and Sexual Activity   Alcohol Use Yes    Comment: 2-3 drinks weekly       Depression Screen:   PHQ-2/PHQ-9 Depression Screening 2020   Little interest or pleasure in doing things 0   Feeling down, depressed, or hopeless 0   Trouble falling or staying asleep, or sleeping too much -   Feeling tired or having little energy -   Poor appetite or overeating -   Feeling bad about yourself - or that you are a failure or have let yourself or your family down -   Trouble concentrating on things, such as reading the newspaper or watching television -   Moving or speaking so slowly that other people could have noticed. Or the opposite - being so fidgety or restless that you have been moving around a lot more than usual -   Thoughts that you would be better off dead, or of hurting yourself in some way -   Total Score 0   If you checked off any problems, how difficult have these problems made it for you to do your work, take care of things at home, or get along with other people? -       Fall Risk Screen:  ILIANA  Fall Risk Assessment was completed, and patient is at LOW risk for falls.Assessment completed on:9/25/2020    Health Habits and Functional and Cognitive Screening:  Functional & Cognitive Status 9/25/2020   Do you have difficulty preparing food and eating? No   Do you have difficulty bathing yourself, getting dressed or grooming yourself? No   Do you have difficulty using the toilet? No   Do you have difficulty moving around from place to place? No   Do you have trouble with steps or getting out of a bed or a chair? No   Current Diet Well Balanced Diet   Dental Exam Up to date   Eye Exam Not up to date   Exercise (times per week) 0 times per week   Current Exercise Activities Include None   Do you need help using the phone?  No   Are you deaf or do you have serious difficulty hearing?  No   Do you need help with transportation? No   Do you need help shopping? No   Do you need help preparing meals?  No   Do you need help with housework?  No   Do you need help with laundry? No   Do you need help taking your medications? No   Do you need help managing money? No   Do you ever drive or ride in a car without wearing a seat belt? No         Does the patient have evidence of cognitive impairment? No    Asprin use counseling:Does not need ASA (and currently is not on it)    Age-appropriate Screening Schedule:  Refer to the list below for future screening recommendations based on patient's age, sex and/or medical conditions. Orders for these recommended tests are listed in the plan section. The patient has been provided with a written plan.    Health Maintenance   Topic Date Due   • TDAP/TD VACCINES (1 - Tdap) 09/29/1964   • ZOSTER VACCINE (2 of 2) 04/18/2018   • LIPID PANEL  06/21/2018   • DXA SCAN  05/15/2020   • MAMMOGRAM  05/02/2021   • COLONOSCOPY  04/11/2023   • INFLUENZA VACCINE  Completed          The following portions of the patient's history were reviewed and updated as appropriate: allergies, current medications,  "past family history, past medical history, past social history, past surgical history and problem list.    No outpatient medications prior to visit.     No facility-administered medications prior to visit.        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 Patient does not have an advance directive, information provided.    Review of Systems     Patient complains of a raw scratchy throat particularly noted in the mornings. Present now for 2 months.  No fever or chills.  No hoarseness or cough.  Denies heartburn or indigestion.  Denies sinus or allergy congestion or postnasal drainage.    Compared to one year ago, the patient feels her physical health is the same.  Compared to one year ago, the patient feels her mental health is the same.    Reviewed chart for potential of high risk medication in the elderly: not applicable  Reviewed chart for potential of harmful drug interactions in the elderly:not applicable    Objective         Vitals:    09/25/20 1020   BP: 140/98   Pulse: 83   Temp: 96.9 °F (36.1 °C)   SpO2: 99%   Weight: 89.8 kg (198 lb)   Height: 165.7 cm (65.24\")       Body mass index is 32.71 kg/m².  Discussed the patient's BMI with her. The BMI is above average; no BMI management plan is appropriate..    Physical Exam     Pulse noted to be slightly irregular.  Elevated blood pressure at 140/98.  Oropharynx is clear without erythema or drainage.          Assessment/Plan   Medicare Risks and Personalized Health Plan  CMS Preventative Services Quick Reference  Immunizations Discussed/Encouraged (specific immunizations; Influenza, Prevnar and Shingrix )  Inactivity/Sedentary  Obesity/Overweight     The above risks/problems have been discussed with the " patient.  Pertinent information has been shared with the patient in the After Visit Summary.  Follow up plans and orders are seen below in the Assessment/Plan Section.    Diagnoses and all orders for this visit:    1. Medicare annual wellness visit, subsequent (Primary)    2. Elevated blood pressure reading  -     Comprehensive Metabolic Panel; Future  -     Lipid Panel; Future  -     TSH; Future  -     T4, Free; Future    3. Sore throat  -     TSH; Future  -     T4, Free; Future  -     CBC (No Diff); Future    4. Mixed hyperlipidemia  -     Comprehensive Metabolic Panel; Future  -     Lipid Panel; Future  -     TSH; Future  -     T4, Free; Future      Follow Up:  Return in about 1 year (around 9/25/2021) for Medicare Wellness.     An After Visit Summary and PPPS were given to the patient.

## 2020-09-30 ENCOUNTER — LAB (OUTPATIENT)
Dept: LAB | Facility: HOSPITAL | Age: 75
End: 2020-09-30

## 2020-09-30 DIAGNOSIS — E78.2 MIXED HYPERLIPIDEMIA: ICD-10-CM

## 2020-09-30 DIAGNOSIS — J02.9 SORE THROAT: ICD-10-CM

## 2020-09-30 DIAGNOSIS — R03.0 ELEVATED BLOOD PRESSURE READING: ICD-10-CM

## 2020-09-30 LAB
ALBUMIN SERPL-MCNC: 4.4 G/DL (ref 3.5–5.2)
ALBUMIN/GLOB SERPL: 1.5 G/DL
ALP SERPL-CCNC: 117 U/L (ref 39–117)
ALT SERPL W P-5'-P-CCNC: 14 U/L (ref 1–33)
ANION GAP SERPL CALCULATED.3IONS-SCNC: 10.5 MMOL/L (ref 5–15)
AST SERPL-CCNC: 19 U/L (ref 1–32)
BILIRUB SERPL-MCNC: 0.6 MG/DL (ref 0–1.2)
BUN SERPL-MCNC: 13 MG/DL (ref 8–23)
BUN/CREAT SERPL: 18.6 (ref 7–25)
CALCIUM SPEC-SCNC: 9.5 MG/DL (ref 8.6–10.5)
CHLORIDE SERPL-SCNC: 105 MMOL/L (ref 98–107)
CHOLEST SERPL-MCNC: 228 MG/DL (ref 0–200)
CO2 SERPL-SCNC: 25.5 MMOL/L (ref 22–29)
CREAT SERPL-MCNC: 0.7 MG/DL (ref 0.57–1)
DEPRECATED RDW RBC AUTO: 41.2 FL (ref 37–54)
ERYTHROCYTE [DISTWIDTH] IN BLOOD BY AUTOMATED COUNT: 11.9 % (ref 12.3–15.4)
GFR SERPL CREATININE-BSD FRML MDRD: 81 ML/MIN/1.73
GLOBULIN UR ELPH-MCNC: 3 GM/DL
GLUCOSE SERPL-MCNC: 104 MG/DL (ref 65–99)
HCT VFR BLD AUTO: 42.7 % (ref 34–46.6)
HDLC SERPL-MCNC: 49 MG/DL (ref 40–60)
HGB BLD-MCNC: 14.3 G/DL (ref 12–15.9)
LDLC SERPL CALC-MCNC: 155 MG/DL (ref 0–100)
LDLC/HDLC SERPL: 3.17 {RATIO}
MCH RBC QN AUTO: 31.5 PG (ref 26.6–33)
MCHC RBC AUTO-ENTMCNC: 33.5 G/DL (ref 31.5–35.7)
MCV RBC AUTO: 94.1 FL (ref 79–97)
PLATELET # BLD AUTO: 246 10*3/MM3 (ref 140–450)
PMV BLD AUTO: 10.4 FL (ref 6–12)
POTASSIUM SERPL-SCNC: 4.3 MMOL/L (ref 3.5–5.2)
PROT SERPL-MCNC: 7.4 G/DL (ref 6–8.5)
RBC # BLD AUTO: 4.54 10*6/MM3 (ref 3.77–5.28)
SODIUM SERPL-SCNC: 141 MMOL/L (ref 136–145)
T4 FREE SERPL-MCNC: 1.39 NG/DL (ref 0.93–1.7)
TRIGL SERPL-MCNC: 118 MG/DL (ref 0–150)
TSH SERPL DL<=0.05 MIU/L-ACNC: 1.39 UIU/ML (ref 0.27–4.2)
VLDLC SERPL-MCNC: 23.6 MG/DL (ref 5–40)
WBC # BLD AUTO: 4.45 10*3/MM3 (ref 3.4–10.8)

## 2020-09-30 PROCEDURE — 80061 LIPID PANEL: CPT

## 2020-09-30 PROCEDURE — 36415 COLL VENOUS BLD VENIPUNCTURE: CPT

## 2020-09-30 PROCEDURE — 80053 COMPREHEN METABOLIC PANEL: CPT

## 2020-09-30 PROCEDURE — 84439 ASSAY OF FREE THYROXINE: CPT

## 2020-09-30 PROCEDURE — 84443 ASSAY THYROID STIM HORMONE: CPT

## 2020-09-30 PROCEDURE — 85027 COMPLETE CBC AUTOMATED: CPT

## 2020-10-02 PROCEDURE — 90694 VACC AIIV4 NO PRSRV 0.5ML IM: CPT | Performed by: FAMILY MEDICINE

## 2020-10-02 PROCEDURE — G0008 ADMIN INFLUENZA VIRUS VAC: HCPCS | Performed by: FAMILY MEDICINE

## 2020-10-29 ENCOUNTER — TELEPHONE (OUTPATIENT)
Dept: FAMILY MEDICINE CLINIC | Facility: CLINIC | Age: 75
End: 2020-10-29

## 2020-10-29 NOTE — TELEPHONE ENCOUNTER
PT IS APPLYING FOR A NEW SOCIAL SECURITY CARD AND THEY NEED HER LAST OFFICE NOTES FROM 9/25.    PT IS REQUESTING THE OFFICE NOTES FROM 9/25 SIGNED AND DATED BY . WITH A SEAL OR STAMP.    SOCIAL SECURITY IS WAITING FOR THIS INFORMATION BEFORE THEY SEND A NEW CARD.    PT STATES THAT SHE NEEDS IT ASAP.    PLEASE ADVISE  [phone]

## 2020-10-29 NOTE — TELEPHONE ENCOUNTER
PATIENT IS ALSO NEEDING HER DATE OF BIRTH ADDED TO THE DOCUMENT FOR SOCIAL SECURITY.     CONTACT: [phone]

## 2020-10-30 NOTE — TELEPHONE ENCOUNTER
PATIENT CALLED TO CHECK ON THE STATUS OF THIS REQUEST AND WANTED TO LEAVE HER CELL NUMBER FOR A CALL BACK. SHE STATE SHE CAN PICK IT UP AS SOON AS IT IS READY.    PLEASE CALL PATIENT -[phone]

## 2020-11-03 ENCOUNTER — TELEPHONE (OUTPATIENT)
Dept: FAMILY MEDICINE CLINIC | Facility: CLINIC | Age: 75
End: 2020-11-03

## 2020-11-03 RX ORDER — AMOXICILLIN AND CLAVULANATE POTASSIUM 875; 125 MG/1; MG/1
1 TABLET, FILM COATED ORAL 2 TIMES DAILY
Qty: 20 TABLET | Refills: 0 | Status: SHIPPED | OUTPATIENT
Start: 2020-11-03 | End: 2020-11-13

## 2020-11-03 NOTE — TELEPHONE ENCOUNTER
PATIENT STATES HER THROAT ISN'T ANY BETTER THAN WHEN SHE LAST SAW DR. LOMBARDO ON 09-25-20.  SHE WANTS TO KNOW IF DR. LOMBARDO CAN CALL SOMETHING IN FOR HER.

## 2020-11-18 ENCOUNTER — HOSPITAL ENCOUNTER (OUTPATIENT)
Dept: BONE DENSITY | Facility: HOSPITAL | Age: 75
Discharge: HOME | End: 2020-11-18
Admitting: OBSTETRICS & GYNECOLOGY

## 2020-11-18 DIAGNOSIS — M81.8 OTHER OSTEOPOROSIS WITHOUT CURRENT PATHOLOGICAL FRACTURE: ICD-10-CM

## 2020-11-18 PROCEDURE — 77080 DXA BONE DENSITY AXIAL: CPT

## 2020-11-19 PROBLEM — M85.89 OSTEOPENIA OF MULTIPLE SITES: Status: ACTIVE | Noted: 2020-11-19

## 2020-11-19 RX ORDER — ALENDRONATE SODIUM 70 MG/1
70 TABLET ORAL
Qty: 4 TABLET | Refills: 12 | Status: SHIPPED | OUTPATIENT
Start: 2020-11-19 | End: 2021-01-18

## 2020-12-23 ENCOUNTER — HOSPITAL ENCOUNTER (OUTPATIENT)
Dept: MAMMOGRAPHY | Facility: HOSPITAL | Age: 75
Discharge: HOME | End: 2020-12-23
Admitting: OBSTETRICS & GYNECOLOGY

## 2020-12-23 DIAGNOSIS — Z12.31 VISIT FOR SCREENING MAMMOGRAM: ICD-10-CM

## 2020-12-23 PROCEDURE — 77067 SCR MAMMO BI INCL CAD: CPT

## 2020-12-23 PROCEDURE — 77063 BREAST TOMOSYNTHESIS BI: CPT | Performed by: RADIOLOGY

## 2020-12-23 PROCEDURE — 77067 SCR MAMMO BI INCL CAD: CPT | Performed by: RADIOLOGY

## 2020-12-23 PROCEDURE — 77063 BREAST TOMOSYNTHESIS BI: CPT

## 2021-01-18 ENCOUNTER — OFFICE VISIT (OUTPATIENT)
Dept: FAMILY MEDICINE CLINIC | Facility: CLINIC | Age: 76
End: 2021-01-18

## 2021-01-18 ENCOUNTER — TELEPHONE (OUTPATIENT)
Dept: FAMILY MEDICINE CLINIC | Facility: CLINIC | Age: 76
End: 2021-01-18

## 2021-01-18 VITALS
TEMPERATURE: 96.8 F | DIASTOLIC BLOOD PRESSURE: 88 MMHG | OXYGEN SATURATION: 98 % | HEART RATE: 96 BPM | HEIGHT: 65 IN | BODY MASS INDEX: 34.02 KG/M2 | WEIGHT: 204.2 LBS | SYSTOLIC BLOOD PRESSURE: 144 MMHG

## 2021-01-18 DIAGNOSIS — R22.1 NECK MASS: Primary | ICD-10-CM

## 2021-01-18 PROCEDURE — 99213 OFFICE O/P EST LOW 20 MIN: CPT | Performed by: FAMILY MEDICINE

## 2021-01-18 NOTE — PROGRESS NOTES
Subjective   Bushra Vaz is a 75 y.o. female    Chief Complaint    Lump/mass left neck    History of Present Illness  Patient presents today with a relatively recently noted fullness or mass in the left neck region.  She has not noted that it is increasing in size rapidly.  It is not tender.  She has not been ill with any upper respiratory symptoms, sore throats, earaches, scalp infections.  She is concerned about lymph node cancers as her mother had non-Hodgkin's lymphoma.  Patient has a remote history of endometrial cancer in 2010.    The following portions of the patient's history were reviewed and updated as appropriate: allergies, current medications, past social history and problem list    Review of Systems   Constitutional: Negative.  Negative for chills, fatigue and fever.   HENT: Negative.    Eyes: Negative.    Respiratory: Negative.    Cardiovascular: Negative.    Genitourinary: Negative.    Musculoskeletal: Negative.    Skin: Negative.    Neurological: Negative.    Hematological: Positive for adenopathy.   Psychiatric/Behavioral: Negative.        Objective     Vitals:    01/18/21 1427   BP: 144/88   Pulse: 96   Temp: 96.8 °F (36 °C)   SpO2: 98%       Physical Exam  Vitals signs and nursing note reviewed.   Constitutional:       Appearance: She is obese.   HENT:      Head: Normocephalic and atraumatic.      Right Ear: Tympanic membrane and ear canal normal.      Left Ear: Tympanic membrane and ear canal normal.      Nose: Nose normal.      Mouth/Throat:      Mouth: Mucous membranes are moist.      Pharynx: No oropharyngeal exudate.   Eyes:      Extraocular Movements: Extraocular movements intact.      Conjunctiva/sclera: Conjunctivae normal.      Pupils: Pupils are equal, round, and reactive to light.   Neck:      Musculoskeletal: No neck rigidity or muscular tenderness.      Vascular: No carotid bruit.      Comments: Area of fullness poorly circumscribed in the left supraclavicular region.  No  palpable nodes or masses in the clavicular region nor the anterior or posterior cervical region.  Lymphadenopathy:      Cervical: No cervical adenopathy.   Neurological:      Mental Status: She is alert.         Assessment/Plan   Problems Addressed this Visit     None      Visit Diagnoses     Neck mass    -  Primary    Relevant Orders    CT soft tissue neck wo contrast      Diagnoses       Codes Comments    Neck mass    -  Primary ICD-10-CM: R22.1  ICD-9-CM: 784.2         Exam is more consistent with a apical fat pad than any adenopathy or other type mass.

## 2021-01-18 NOTE — TELEPHONE ENCOUNTER
PATIENT FEELS A LUMP ON THE SIDE OF HER THROAT.  SHE'S CONCERNED IT'S SOMETHING SERIOUS AND WOULD LIKE TO COME INTO THE OFFICE TODAY.

## 2021-01-22 ENCOUNTER — HOSPITAL ENCOUNTER (OUTPATIENT)
Dept: CT IMAGING | Facility: HOSPITAL | Age: 76
Discharge: HOME | End: 2021-01-22
Admitting: FAMILY MEDICINE

## 2021-01-22 DIAGNOSIS — R22.1 NECK MASS: ICD-10-CM

## 2021-01-22 PROCEDURE — 70490 CT SOFT TISSUE NECK W/O DYE: CPT

## 2021-01-25 ENCOUNTER — TELEPHONE (OUTPATIENT)
Dept: FAMILY MEDICINE CLINIC | Facility: CLINIC | Age: 76
End: 2021-01-25

## 2021-07-08 ENCOUNTER — OFFICE VISIT (OUTPATIENT)
Dept: OBSTETRICS AND GYNECOLOGY | Facility: CLINIC | Age: 76
End: 2021-07-08

## 2021-07-08 VITALS
SYSTOLIC BLOOD PRESSURE: 124 MMHG | BODY MASS INDEX: 33.99 KG/M2 | DIASTOLIC BLOOD PRESSURE: 80 MMHG | WEIGHT: 204 LBS | HEIGHT: 65 IN

## 2021-07-08 DIAGNOSIS — M85.89 OSTEOPENIA OF MULTIPLE SITES: Primary | ICD-10-CM

## 2021-07-08 DIAGNOSIS — N95.2 VAGINAL ATROPHY: ICD-10-CM

## 2021-07-08 DIAGNOSIS — M81.8 OTHER OSTEOPOROSIS WITHOUT CURRENT PATHOLOGICAL FRACTURE: ICD-10-CM

## 2021-07-08 PROBLEM — R79.89 LOW VITAMIN D LEVEL: Status: ACTIVE | Noted: 2021-07-08

## 2021-07-08 PROCEDURE — 99213 OFFICE O/P EST LOW 20 MIN: CPT | Performed by: OBSTETRICS & GYNECOLOGY

## 2021-07-08 RX ORDER — ALENDRONATE SODIUM 70 MG/1
70 TABLET ORAL
Qty: 4 TABLET | Refills: 12 | Status: SHIPPED | OUTPATIENT
Start: 2021-07-08 | End: 2021-10-04

## 2021-07-08 NOTE — PROGRESS NOTES
Subjective   Chief Complaint   Patient presents with   • Osteoporosis     Bushra Vaz is a 75 y.o. year old  menopausal female presenting to be seen to discuss her bone loss.    There has not been vaginal bleeding in the last 12 months.  Hot flashes and night sweats are not a significant problem.  It does not sound like she gets enough calcium in her diet she gets some but does not know how much.  I reviewed she had a history of low vitamin D of 14.6 of them returned to normal with treatment but she is not taking any vitamin D.  She is out in the sun somewhat.  I suggested she will need to be on about 2000 units daily.  We will also give her some dietary information regarding calcium  Discussed her last bone density study she does not recall any but getting in touch with her about I reviewed the computer and I left her a note after I got the results indicating I thought she should consider treatment since her risk for hip fracture was 4.6% over 10 years risk for major fracture was 15%.  Thresholds are 20% and 3%.  Also discussed weightbearing exercise.  She does not get any she is active at home but that is all  Up-to-date on colonoscopy she had a Pap test last year because she has a history of adeno squamous carcinoma of the endometrium .  May need to repeat these about every 3 years?  Other option would be simply to visualize the vaginal cuff.    SEXUAL Hx:  She is not sexually active.  Vaginal dryness is not a problem.  [place] is painful:not asked  She has concerns about domestic violence: no  Discussed risk for STD and sexual behavior.    HEALTH Hx:  She exercises regularly: no. Active at home  She wears her seat belt:yes.  Self breast awareness: no  She has noticed changes in height: no              Calcium intake is not adequate 1 daily              Caffeine intake: no or mild caffeine use              Discussed immunizations, screenings, mental and dental health.    The following portions  "of the patient's history were reviewed and updated as appropriate:problem list, current medications, allergies, past family history, past medical history, past social history and past surgical history.      Current Outpatient Medications:   •  alendronate (Fosamax) 70 MG tablet, Take 1 tablet by mouth Every 7 (Seven) Days. Take on an empty stomach once weekly and avoid lying down for 30 minutes, Disp: 4 tablet, Rfl: 12    Social History    Tobacco Use      Smoking status: Former Smoker        Packs/day: 0.20        Types: Cigarettes        Quit date:         Years since quittin.5      Smokeless tobacco: Never Used      Tobacco comment: smoked socially as young adult    Social History     Substance and Sexual Activity   Alcohol Use Yes    Comment: 2-3 drinks weekly     Discussed avoidance of illicit drugs    Review of systems  Constitutional   POS weight gain                           NEG fatigue, fevers and malaise  Breast               POS nothing reported                           NEG persistent breast lump, skin dimpling, breast tenderness or nipple discharge  GI                     POS nothing reported                           NEG bloating, change in bowel habits, melena or reflux symptoms                      POS nothing reported                           NEG dysuria or hematuria           Objective   /80   Ht 165.1 cm (65\")   Wt 92.5 kg (204 lb)   Breastfeeding No   BMI 33.95 kg/m²      General:  well developed; well nourished  no acute distress  appears stated age   Sk  No suspicious lesions seen   Thyroid: not examined   Breasts:  Examined in supine position  Symmetric without masses or skin dimpling  Nipples normal without inversion, lesions or discharge  Fibrocystic changes are present both breasts without a discrete mass   Abdomen: soft, non-tender; no masses  no umbilical or inguinal hernias are present  no hepato-splenomegaly   Pelvis: Clinical staff was present for exam  External " genitalia:  normal appearance of the external genitalia including Bartholin's and Sophia's glands.  :  urethral meatus normal;  Vaginal:  atrophic mucosal changes are present;  Uterus:  absent.  Adnexa:  non palpable bilaterally.  Rectal:  digital rectal exam not performed; anus visually normal appearing.       Lab Review   CBC, CMP, Pap test, TSH and Vitamin D  Imaging review  DEXA  Mammogram report               Assessment     1. Osteoporosis bone density study 2016/osteopenia in 2020 but the T-scores at the spine and one femoral neck were -2.4 and the other femoral neck -2.2 so essentially osteoporosis.   2.  History of low vitamin D level no longer on replacement.  Inadequate calcium and exercise  2.  Mammogram, colonoscopy and Pap up-to-date.  Lab work through Dr. English       Plan     1. Annual or sooner as needed  2. Calcium discussed  1200 mg daily in divided doses ideally in diet information given  3. Epic information provided regarding osteoporosis and osteoporosis friendly diet  4. Regular weight bearing exercise, nutrition, injury avoidance and maintaining healthy weight discussed  5. Breast self awareness and mammograms discussed      New Medications Ordered This Visit   Medications   • alendronate (Fosamax) 70 MG tablet     Sig: Take 1 tablet by mouth Every 7 (Seven) Days. Take on an empty stomach once weekly and avoid lying down for 30 minutes     Dispense:  4 tablet     Refill:  12      No orders of the defined types were placed in this encounter.             This note was electronically signed.    Luis A Gallego M.D.  July 8, 2021

## 2021-10-04 ENCOUNTER — OFFICE VISIT (OUTPATIENT)
Dept: FAMILY MEDICINE CLINIC | Facility: CLINIC | Age: 76
End: 2021-10-04

## 2021-10-04 VITALS
WEIGHT: 203.6 LBS | TEMPERATURE: 98 F | BODY MASS INDEX: 33.92 KG/M2 | HEART RATE: 66 BPM | SYSTOLIC BLOOD PRESSURE: 122 MMHG | OXYGEN SATURATION: 98 % | RESPIRATION RATE: 16 BRPM | HEIGHT: 65 IN | DIASTOLIC BLOOD PRESSURE: 78 MMHG

## 2021-10-04 DIAGNOSIS — Z00.00 MEDICARE ANNUAL WELLNESS VISIT, SUBSEQUENT: Primary | ICD-10-CM

## 2021-10-04 DIAGNOSIS — Z23 NEED FOR VACCINATION: ICD-10-CM

## 2021-10-04 DIAGNOSIS — R53.83 FATIGUE, UNSPECIFIED TYPE: ICD-10-CM

## 2021-10-04 DIAGNOSIS — E78.2 MIXED HYPERLIPIDEMIA: ICD-10-CM

## 2021-10-04 PROCEDURE — G0008 ADMIN INFLUENZA VIRUS VAC: HCPCS | Performed by: FAMILY MEDICINE

## 2021-10-04 PROCEDURE — 90662 IIV NO PRSV INCREASED AG IM: CPT | Performed by: FAMILY MEDICINE

## 2021-10-04 PROCEDURE — G0439 PPPS, SUBSEQ VISIT: HCPCS | Performed by: FAMILY MEDICINE

## 2021-10-04 NOTE — PROGRESS NOTES
The ABCs of the Annual Wellness Visit  Subsequent Medicare Wellness Visit    No chief complaint on file.     Subjective   History of Present Illness:  Bushra Vaz is a 76 y.o. female who presents for a Subsequent Medicare Wellness Visit.    The following portions of the patient's history were reviewed and   updated as appropriate: allergies, current medications, past family history, past medical history, past social history, past surgical history and problem list.    Compared to one year ago, the patient feels her physical   health is the same.    Compared to one year ago, the patient feels her mental   health is the same.    Recent Hospitalizations:  She was not admitted to the hospital during the last year.       Current Medical Providers:  Patient Care Team:  Noah English MD as PCP - General (Family Medicine)    Outpatient Medications Prior to Visit   Medication Sig Dispense Refill   • alendronate (Fosamax) 70 MG tablet Take 1 tablet by mouth Every 7 (Seven) Days. Take on an empty stomach once weekly and avoid lying down for 30 minutes 4 tablet 12     No facility-administered medications prior to visit.       No opioid medication identified on active medication list. I have reviewed chart for other potential  high risk medication/s and harmful drug interactions in the elderly.          Aspirin is not on active medication list.  Aspirin use is not indicated based on review of current medical condition/s. Risk of harm outweighs potential benefits.  .    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   • Non morbid obesity   • Vaginal atrophy   • Osteoporosis DEXA scan 2016   • Osteopenia of multiple sites DEXA November 2020 FRAX risk 15% / 4.6% hip   • History low vitamin D level corrected on supplementation     Advance Care Planning  has NO advanced directive - not interested in additional  "information    Review of Systems      Objective      Vitals:    10/04/21 0903   BP: 122/78   Pulse: 66   Resp: 16   Temp: 98 °F (36.7 °C)   SpO2: 98%   Weight: 92.4 kg (203 lb 9.6 oz)   Height: 165.1 cm (65\")   PainSc: 0-No pain     BMI Readings from Last 1 Encounters:   10/04/21 33.88 kg/m²   BMI is above normal parameters. Recommendations include: nutrition counseling    Does the patient have evidence of cognitive impairment? No    Physical Exam            HEALTH RISK ASSESSMENT    Smoking Status:  Social History     Tobacco Use   Smoking Status Former Smoker   • Packs/day: 0.20   • Types: Cigarettes   • Quit date:    • Years since quittin.7   Smokeless Tobacco Never Used   Tobacco Comment    smoked socially as young adult     Alcohol Consumption:  Social History     Substance and Sexual Activity   Alcohol Use Yes    Comment: 2-3 drinks weekly     Fall Risk Screen:    ILIANA Fall Risk Assessment was completed, and patient is at LOW risk for falls.Assessment completed on:10/4/2021    Depression Screening:  PHQ-2/PHQ-9 Depression Screening 10/4/2021   Little interest or pleasure in doing things 0   Feeling down, depressed, or hopeless 0   Trouble falling or staying asleep, or sleeping too much -   Feeling tired or having little energy -   Poor appetite or overeating -   Feeling bad about yourself - or that you are a failure or have let yourself or your family down -   Trouble concentrating on things, such as reading the newspaper or watching television -   Moving or speaking so slowly that other people could have noticed. Or the opposite - being so fidgety or restless that you have been moving around a lot more than usual -   Thoughts that you would be better off dead, or of hurting yourself in some way -   Total Score 0   If you checked off any problems, how difficult have these problems made it for you to do your work, take care of things at home, or get along with other people? -       Health Habits and " Functional and Cognitive Screening:  Functional & Cognitive Status 10/4/2021   Do you have difficulty preparing food and eating? No   Do you have difficulty bathing yourself, getting dressed or grooming yourself? No   Do you have difficulty using the toilet? No   Do you have difficulty moving around from place to place? No   Do you have trouble with steps or getting out of a bed or a chair? No   Current Diet Well Balanced Diet   Dental Exam Up to date   Eye Exam Up to date   Exercise (times per week) 0 times per week   Current Exercise Activities Include -   Do you need help using the phone?  No   Are you deaf or do you have serious difficulty hearing?  No   Do you need help with transportation? No   Do you need help shopping? No   Do you need help preparing meals?  No   Do you need help with housework?  No   Do you need help with laundry? No   Do you need help taking your medications? No   Do you need help managing money? No   Do you ever drive or ride in a car without wearing a seat belt? No   Have you felt unusual stress, anger or loneliness in the last month? No   Who do you live with? Spouse   If you need help, do you have trouble finding someone available to you? No   Have you been bothered in the last four weeks by sexual problems? No   Do you have difficulty concentrating, remembering or making decisions? No       Age-appropriate Screening Schedule:  Refer to the list below for future screening recommendations based on patient's age, sex and/or medical conditions. Orders for these recommended tests are listed in the plan section. The patient has been provided with a written plan.    Health Maintenance   Topic Date Due   • TDAP/TD VACCINES (1 - Tdap) Never done   • ZOSTER VACCINE (2 of 2) 04/18/2018   • LIPID PANEL  09/30/2021   • DXA SCAN  11/18/2022   • MAMMOGRAM  12/23/2022   • INFLUENZA VACCINE  Completed              Assessment/Plan     CMS Preventative Services Quick Reference  Risk Factors Identified  During Encounter  Immunizations Discussed/Encouraged (specific Immunizations; Shingrix  Inactivity/Sedentary  Obesity/Overweight   The above risks/problems have been discussed with the patient.  Follow up actions/plans if indicated are seen below in the Assessment/Plan Section.  Pertinent information has been shared with the patient in the After Visit Summary.    Diagnoses and all orders for this visit:    1. Medicare annual wellness visit, subsequent (Primary)    2. Mixed hyperlipidemia  -     Comprehensive Metabolic Panel; Future  -     Lipid Panel; Future    3. Need for vaccination  -     Fluzone High-Dose 65+yrs (6210-8414)    4. Fatigue, unspecified type  -     Comprehensive Metabolic Panel; Future  -     CBC (No Diff); Future        Follow Up:  Return in about 1 year (around 10/4/2022) for Medicare Wellness.     An After Visit Summary and PPPS were given to the patient.

## 2021-10-28 ENCOUNTER — LAB (OUTPATIENT)
Dept: LAB | Facility: HOSPITAL | Age: 76
End: 2021-10-28

## 2021-10-28 DIAGNOSIS — E78.2 MIXED HYPERLIPIDEMIA: ICD-10-CM

## 2021-10-28 DIAGNOSIS — R53.83 FATIGUE, UNSPECIFIED TYPE: ICD-10-CM

## 2021-10-28 LAB
ALBUMIN SERPL-MCNC: 4.4 G/DL (ref 3.5–5.2)
ALBUMIN/GLOB SERPL: 1.3 G/DL
ALP SERPL-CCNC: 114 U/L (ref 39–117)
ALT SERPL W P-5'-P-CCNC: 13 U/L (ref 1–33)
ANION GAP SERPL CALCULATED.3IONS-SCNC: 10.8 MMOL/L (ref 5–15)
AST SERPL-CCNC: 24 U/L (ref 1–32)
BILIRUB SERPL-MCNC: 0.6 MG/DL (ref 0–1.2)
BUN SERPL-MCNC: 18 MG/DL (ref 8–23)
BUN/CREAT SERPL: 24.7 (ref 7–25)
CALCIUM SPEC-SCNC: 9.6 MG/DL (ref 8.6–10.5)
CHLORIDE SERPL-SCNC: 105 MMOL/L (ref 98–107)
CHOLEST SERPL-MCNC: 243 MG/DL (ref 0–200)
CO2 SERPL-SCNC: 25.2 MMOL/L (ref 22–29)
CREAT SERPL-MCNC: 0.73 MG/DL (ref 0.57–1)
DEPRECATED RDW RBC AUTO: 39.5 FL (ref 37–54)
ERYTHROCYTE [DISTWIDTH] IN BLOOD BY AUTOMATED COUNT: 11.6 % (ref 12.3–15.4)
GFR SERPL CREATININE-BSD FRML MDRD: 77 ML/MIN/1.73
GLOBULIN UR ELPH-MCNC: 3.3 GM/DL
GLUCOSE SERPL-MCNC: 101 MG/DL (ref 65–99)
HCT VFR BLD AUTO: 45 % (ref 34–46.6)
HDLC SERPL-MCNC: 54 MG/DL (ref 40–60)
HGB BLD-MCNC: 15.2 G/DL (ref 12–15.9)
LDLC SERPL CALC-MCNC: 170 MG/DL (ref 0–100)
LDLC/HDLC SERPL: 3.1 {RATIO}
MCH RBC QN AUTO: 31.5 PG (ref 26.6–33)
MCHC RBC AUTO-ENTMCNC: 33.8 G/DL (ref 31.5–35.7)
MCV RBC AUTO: 93.2 FL (ref 79–97)
PLATELET # BLD AUTO: 273 10*3/MM3 (ref 140–450)
PMV BLD AUTO: 10.5 FL (ref 6–12)
POTASSIUM SERPL-SCNC: 4.2 MMOL/L (ref 3.5–5.2)
PROT SERPL-MCNC: 7.7 G/DL (ref 6–8.5)
RBC # BLD AUTO: 4.83 10*6/MM3 (ref 3.77–5.28)
SODIUM SERPL-SCNC: 141 MMOL/L (ref 136–145)
TRIGL SERPL-MCNC: 108 MG/DL (ref 0–150)
VLDLC SERPL-MCNC: 19 MG/DL (ref 5–40)
WBC # BLD AUTO: 4.47 10*3/MM3 (ref 3.4–10.8)

## 2021-10-28 PROCEDURE — 85027 COMPLETE CBC AUTOMATED: CPT

## 2021-10-28 PROCEDURE — 36415 COLL VENOUS BLD VENIPUNCTURE: CPT

## 2021-10-28 PROCEDURE — 80061 LIPID PANEL: CPT

## 2021-10-28 PROCEDURE — 80053 COMPREHEN METABOLIC PANEL: CPT

## 2022-01-04 ENCOUNTER — TRANSCRIBE ORDERS (OUTPATIENT)
Dept: ADMINISTRATIVE | Facility: HOSPITAL | Age: 77
End: 2022-01-04

## 2022-01-04 DIAGNOSIS — Z12.31 VISIT FOR SCREENING MAMMOGRAM: Primary | ICD-10-CM

## 2022-03-08 ENCOUNTER — HOSPITAL ENCOUNTER (OUTPATIENT)
Dept: MAMMOGRAPHY | Facility: HOSPITAL | Age: 77
Discharge: HOME | End: 2022-03-08
Admitting: NURSE PRACTITIONER

## 2022-03-08 DIAGNOSIS — Z12.31 VISIT FOR SCREENING MAMMOGRAM: ICD-10-CM

## 2022-03-08 PROCEDURE — 77063 BREAST TOMOSYNTHESIS BI: CPT

## 2022-03-08 PROCEDURE — 77067 SCR MAMMO BI INCL CAD: CPT

## 2022-03-08 PROCEDURE — 77067 SCR MAMMO BI INCL CAD: CPT | Performed by: RADIOLOGY

## 2022-03-08 PROCEDURE — 77063 BREAST TOMOSYNTHESIS BI: CPT | Performed by: RADIOLOGY

## 2022-05-15 ENCOUNTER — APPOINTMENT (OUTPATIENT)
Dept: GENERAL RADIOLOGY | Facility: HOSPITAL | Age: 77
End: 2022-05-15

## 2022-05-15 ENCOUNTER — APPOINTMENT (OUTPATIENT)
Dept: CT IMAGING | Facility: HOSPITAL | Age: 77
End: 2022-05-15

## 2022-05-15 ENCOUNTER — HOSPITAL ENCOUNTER (INPATIENT)
Facility: HOSPITAL | Age: 77
LOS: 6 days | Discharge: NURSING, REHAB OR LONG-TERM CARE | End: 2022-05-21
Attending: EMERGENCY MEDICINE | Admitting: INTERNAL MEDICINE

## 2022-05-15 DIAGNOSIS — S09.90XA INJURY OF HEAD, INITIAL ENCOUNTER: ICD-10-CM

## 2022-05-15 DIAGNOSIS — I48.91 ATRIAL FIBRILLATION WITH RAPID VENTRICULAR RESPONSE: ICD-10-CM

## 2022-05-15 DIAGNOSIS — S72.22XA: Primary | ICD-10-CM

## 2022-05-15 DIAGNOSIS — S72.22XA CLOSED DISPLACED SUBTROCHANTERIC FRACTURE OF LEFT FEMUR, INITIAL ENCOUNTER: ICD-10-CM

## 2022-05-15 PROBLEM — S72.002A CLOSED LEFT HIP FRACTURE (HCC): Status: ACTIVE | Noted: 2022-05-15

## 2022-05-15 PROBLEM — Z86.73 HISTORY OF CVA (CEREBROVASCULAR ACCIDENT): Status: ACTIVE | Noted: 2022-05-15

## 2022-05-15 PROBLEM — R73.9 HYPERGLYCEMIA: Status: ACTIVE | Noted: 2022-05-15

## 2022-05-15 PROBLEM — S72.009A HIP FRACTURE: Status: ACTIVE | Noted: 2022-05-15

## 2022-05-15 PROBLEM — D72.829 LEUKOCYTOSIS: Status: ACTIVE | Noted: 2022-05-15

## 2022-05-15 LAB
ABO GROUP BLD: NORMAL
ALBUMIN SERPL-MCNC: 3.8 G/DL (ref 3.5–5.2)
ALBUMIN/GLOB SERPL: 1.3 G/DL
ALP SERPL-CCNC: 111 U/L (ref 39–117)
ALT SERPL W P-5'-P-CCNC: 13 U/L (ref 1–33)
ANION GAP SERPL CALCULATED.3IONS-SCNC: 12 MMOL/L (ref 5–15)
AST SERPL-CCNC: 20 U/L (ref 1–32)
BASOPHILS # BLD AUTO: 0.03 10*3/MM3 (ref 0–0.2)
BASOPHILS NFR BLD AUTO: 0.2 % (ref 0–1.5)
BILIRUB SERPL-MCNC: 0.5 MG/DL (ref 0–1.2)
BLD GP AB SCN SERPL QL: NEGATIVE
BUN SERPL-MCNC: 15 MG/DL (ref 8–23)
BUN/CREAT SERPL: 21.4 (ref 7–25)
CALCIUM SPEC-SCNC: 8.7 MG/DL (ref 8.6–10.5)
CHLORIDE SERPL-SCNC: 102 MMOL/L (ref 98–107)
CO2 SERPL-SCNC: 23 MMOL/L (ref 22–29)
CREAT SERPL-MCNC: 0.7 MG/DL (ref 0.57–1)
DEPRECATED RDW RBC AUTO: 44.8 FL (ref 37–54)
EGFRCR SERPLBLD CKD-EPI 2021: 88.7 ML/MIN/1.73
EOSINOPHIL # BLD AUTO: 0.02 10*3/MM3 (ref 0–0.4)
EOSINOPHIL NFR BLD AUTO: 0.2 % (ref 0.3–6.2)
ERYTHROCYTE [DISTWIDTH] IN BLOOD BY AUTOMATED COUNT: 12.1 % (ref 12.3–15.4)
GLOBULIN UR ELPH-MCNC: 3 GM/DL
GLUCOSE SERPL-MCNC: 145 MG/DL (ref 65–99)
HBA1C MFR BLD: 5.6 % (ref 4.8–5.6)
HCT VFR BLD AUTO: 41.2 % (ref 34–46.6)
HGB BLD-MCNC: 13.3 G/DL (ref 12–15.9)
IMM GRANULOCYTES # BLD AUTO: 0.05 10*3/MM3 (ref 0–0.05)
IMM GRANULOCYTES NFR BLD AUTO: 0.4 % (ref 0–0.5)
LYMPHOCYTES # BLD AUTO: 0.89 10*3/MM3 (ref 0.7–3.1)
LYMPHOCYTES NFR BLD AUTO: 7.2 % (ref 19.6–45.3)
MAGNESIUM SERPL-MCNC: 2 MG/DL (ref 1.6–2.4)
MCH RBC QN AUTO: 32.4 PG (ref 26.6–33)
MCHC RBC AUTO-ENTMCNC: 32.3 G/DL (ref 31.5–35.7)
MCV RBC AUTO: 100.2 FL (ref 79–97)
MONOCYTES # BLD AUTO: 0.61 10*3/MM3 (ref 0.1–0.9)
MONOCYTES NFR BLD AUTO: 4.9 % (ref 5–12)
NEUTROPHILS NFR BLD AUTO: 10.84 10*3/MM3 (ref 1.7–7)
NEUTROPHILS NFR BLD AUTO: 87.1 % (ref 42.7–76)
NRBC BLD AUTO-RTO: 0 /100 WBC (ref 0–0.2)
NT-PROBNP SERPL-MCNC: 579.3 PG/ML (ref 0–1800)
PLATELET # BLD AUTO: 228 10*3/MM3 (ref 140–450)
PMV BLD AUTO: 10 FL (ref 6–12)
POTASSIUM SERPL-SCNC: 3.8 MMOL/L (ref 3.5–5.2)
PROCALCITONIN SERPL-MCNC: 0.03 NG/ML (ref 0–0.25)
PROT SERPL-MCNC: 6.8 G/DL (ref 6–8.5)
QT INTERVAL: 350 MS
QTC INTERVAL: 453 MS
RBC # BLD AUTO: 4.11 10*6/MM3 (ref 3.77–5.28)
RH BLD: POSITIVE
SARS-COV-2 RDRP RESP QL NAA+PROBE: NORMAL
SODIUM SERPL-SCNC: 137 MMOL/L (ref 136–145)
T&S EXPIRATION DATE: NORMAL
T4 FREE SERPL-MCNC: 1.37 NG/DL (ref 0.93–1.7)
TROPONIN T SERPL-MCNC: <0.01 NG/ML (ref 0–0.03)
TSH SERPL DL<=0.05 MIU/L-ACNC: 2.92 UIU/ML (ref 0.27–4.2)
WBC NRBC COR # BLD: 12.44 10*3/MM3 (ref 3.4–10.8)

## 2022-05-15 PROCEDURE — 87635 SARS-COV-2 COVID-19 AMP PRB: CPT | Performed by: EMERGENCY MEDICINE

## 2022-05-15 PROCEDURE — 99284 EMERGENCY DEPT VISIT MOD MDM: CPT

## 2022-05-15 PROCEDURE — 83036 HEMOGLOBIN GLYCOSYLATED A1C: CPT | Performed by: PHYSICIAN ASSISTANT

## 2022-05-15 PROCEDURE — 83880 ASSAY OF NATRIURETIC PEPTIDE: CPT | Performed by: EMERGENCY MEDICINE

## 2022-05-15 PROCEDURE — 84443 ASSAY THYROID STIM HORMONE: CPT | Performed by: EMERGENCY MEDICINE

## 2022-05-15 PROCEDURE — 85520 HEPARIN ASSAY: CPT | Performed by: PHYSICIAN ASSISTANT

## 2022-05-15 PROCEDURE — 86900 BLOOD TYPING SEROLOGIC ABO: CPT

## 2022-05-15 PROCEDURE — 25010000002 MORPHINE PER 10 MG: Performed by: EMERGENCY MEDICINE

## 2022-05-15 PROCEDURE — 85025 COMPLETE CBC W/AUTO DIFF WBC: CPT | Performed by: EMERGENCY MEDICINE

## 2022-05-15 PROCEDURE — 71045 X-RAY EXAM CHEST 1 VIEW: CPT

## 2022-05-15 PROCEDURE — 85730 THROMBOPLASTIN TIME PARTIAL: CPT | Performed by: PHYSICIAN ASSISTANT

## 2022-05-15 PROCEDURE — 86901 BLOOD TYPING SEROLOGIC RH(D): CPT | Performed by: EMERGENCY MEDICINE

## 2022-05-15 PROCEDURE — 86900 BLOOD TYPING SEROLOGIC ABO: CPT | Performed by: EMERGENCY MEDICINE

## 2022-05-15 PROCEDURE — 80053 COMPREHEN METABOLIC PANEL: CPT | Performed by: EMERGENCY MEDICINE

## 2022-05-15 PROCEDURE — 86923 COMPATIBILITY TEST ELECTRIC: CPT

## 2022-05-15 PROCEDURE — 83735 ASSAY OF MAGNESIUM: CPT | Performed by: EMERGENCY MEDICINE

## 2022-05-15 PROCEDURE — 99223 1ST HOSP IP/OBS HIGH 75: CPT | Performed by: INTERNAL MEDICINE

## 2022-05-15 PROCEDURE — 84145 PROCALCITONIN (PCT): CPT | Performed by: PHYSICIAN ASSISTANT

## 2022-05-15 PROCEDURE — 25010000002 ONDANSETRON PER 1 MG: Performed by: EMERGENCY MEDICINE

## 2022-05-15 PROCEDURE — 70450 CT HEAD/BRAIN W/O DYE: CPT

## 2022-05-15 PROCEDURE — 73552 X-RAY EXAM OF FEMUR 2/>: CPT

## 2022-05-15 PROCEDURE — 93005 ELECTROCARDIOGRAM TRACING: CPT | Performed by: EMERGENCY MEDICINE

## 2022-05-15 PROCEDURE — 86850 RBC ANTIBODY SCREEN: CPT | Performed by: EMERGENCY MEDICINE

## 2022-05-15 PROCEDURE — 84484 ASSAY OF TROPONIN QUANT: CPT | Performed by: EMERGENCY MEDICINE

## 2022-05-15 PROCEDURE — 86901 BLOOD TYPING SEROLOGIC RH(D): CPT

## 2022-05-15 PROCEDURE — 84439 ASSAY OF FREE THYROXINE: CPT | Performed by: EMERGENCY MEDICINE

## 2022-05-15 PROCEDURE — 72170 X-RAY EXAM OF PELVIS: CPT

## 2022-05-15 PROCEDURE — 85610 PROTHROMBIN TIME: CPT | Performed by: PHYSICIAN ASSISTANT

## 2022-05-15 RX ORDER — ONDANSETRON 2 MG/ML
4 INJECTION INTRAMUSCULAR; INTRAVENOUS ONCE
Status: COMPLETED | OUTPATIENT
Start: 2022-05-15 | End: 2022-05-15

## 2022-05-15 RX ORDER — HEPARIN SODIUM 1000 [USP'U]/ML
30 INJECTION, SOLUTION INTRAVENOUS; SUBCUTANEOUS AS NEEDED
Status: DISCONTINUED | OUTPATIENT
Start: 2022-05-15 | End: 2022-05-15

## 2022-05-15 RX ORDER — HEPARIN SOD,PORCINE/0.9 % NACL 25000/250
11 INTRAVENOUS SOLUTION INTRAVENOUS
Status: DISCONTINUED | OUTPATIENT
Start: 2022-05-16 | End: 2022-05-16

## 2022-05-15 RX ORDER — HEPARIN SODIUM 1000 [USP'U]/ML
60 INJECTION, SOLUTION INTRAVENOUS; SUBCUTANEOUS AS NEEDED
Status: DISCONTINUED | OUTPATIENT
Start: 2022-05-15 | End: 2022-05-15

## 2022-05-15 RX ORDER — MORPHINE SULFATE 4 MG/ML
4 INJECTION, SOLUTION INTRAMUSCULAR; INTRAVENOUS ONCE
Status: COMPLETED | OUTPATIENT
Start: 2022-05-15 | End: 2022-05-15

## 2022-05-15 RX ORDER — HEPARIN SODIUM 1000 [USP'U]/ML
4000 INJECTION, SOLUTION INTRAVENOUS; SUBCUTANEOUS ONCE
Status: COMPLETED | OUTPATIENT
Start: 2022-05-15 | End: 2022-05-16

## 2022-05-15 RX ADMIN — MORPHINE SULFATE 4 MG: 4 INJECTION, SOLUTION INTRAMUSCULAR; INTRAVENOUS at 23:32

## 2022-05-15 RX ADMIN — MORPHINE SULFATE 4 MG: 4 INJECTION, SOLUTION INTRAMUSCULAR; INTRAVENOUS at 21:46

## 2022-05-15 RX ADMIN — ONDANSETRON 4 MG: 2 INJECTION INTRAMUSCULAR; INTRAVENOUS at 21:46

## 2022-05-16 ENCOUNTER — ANESTHESIA EVENT CONVERTED (OUTPATIENT)
Dept: ANESTHESIOLOGY | Facility: HOSPITAL | Age: 77
End: 2022-05-16

## 2022-05-16 ENCOUNTER — APPOINTMENT (OUTPATIENT)
Dept: CARDIOLOGY | Facility: HOSPITAL | Age: 77
End: 2022-05-16

## 2022-05-16 ENCOUNTER — ANESTHESIA EVENT (OUTPATIENT)
Dept: PERIOP | Facility: HOSPITAL | Age: 77
End: 2022-05-16

## 2022-05-16 ENCOUNTER — APPOINTMENT (OUTPATIENT)
Dept: GENERAL RADIOLOGY | Facility: HOSPITAL | Age: 77
End: 2022-05-16

## 2022-05-16 ENCOUNTER — ANESTHESIA (OUTPATIENT)
Dept: PERIOP | Facility: HOSPITAL | Age: 77
End: 2022-05-16

## 2022-05-16 LAB
ABO GROUP BLD: NORMAL
ANION GAP SERPL CALCULATED.3IONS-SCNC: 12 MMOL/L (ref 5–15)
APTT PPP: 27.8 SECONDS (ref 60–90)
APTT PPP: 29.1 SECONDS (ref 60–90)
BASOPHILS # BLD AUTO: 0.03 10*3/MM3 (ref 0–0.2)
BASOPHILS NFR BLD AUTO: 0.2 % (ref 0–1.5)
BH CV ECHO MEAS - AO MAX PG: 8.2 MMHG
BH CV ECHO MEAS - AO MEAN PG: 4.5 MMHG
BH CV ECHO MEAS - AO ROOT DIAM: 2.6 CM
BH CV ECHO MEAS - AO V2 MAX: 143.3 CM/SEC
BH CV ECHO MEAS - AO V2 VTI: 25.7 CM
BH CV ECHO MEAS - AVA(I,D): 1.77 CM2
BH CV ECHO MEAS - EDV(CUBED): 81.8 ML
BH CV ECHO MEAS - EDV(MOD-SP2): 77.8 ML
BH CV ECHO MEAS - EDV(MOD-SP4): 76.5 ML
BH CV ECHO MEAS - EF(MOD-BP): 56.4 %
BH CV ECHO MEAS - EF(MOD-SP2): 57.1 %
BH CV ECHO MEAS - EF(MOD-SP4): 53.1 %
BH CV ECHO MEAS - ESV(CUBED): 29.5 ML
BH CV ECHO MEAS - ESV(MOD-SP2): 33.4 ML
BH CV ECHO MEAS - ESV(MOD-SP4): 35.9 ML
BH CV ECHO MEAS - FS: 28.8 %
BH CV ECHO MEAS - IVS/LVPW: 0.96 CM
BH CV ECHO MEAS - IVSD: 1.2 CM
BH CV ECHO MEAS - LA DIMENSION: 3.8 CM
BH CV ECHO MEAS - LAT PEAK E' VEL: 8.1 CM/SEC
BH CV ECHO MEAS - LV MASS(C)D: 186 GRAMS
BH CV ECHO MEAS - LV MAX PG: 2.33 MMHG
BH CV ECHO MEAS - LV MEAN PG: 1.24 MMHG
BH CV ECHO MEAS - LV V1 MAX: 76.3 CM/SEC
BH CV ECHO MEAS - LV V1 VTI: 15 CM
BH CV ECHO MEAS - LVIDD: 4.3 CM
BH CV ECHO MEAS - LVIDS: 3.1 CM
BH CV ECHO MEAS - LVOT AREA: 3 CM2
BH CV ECHO MEAS - LVOT DIAM: 1.97 CM
BH CV ECHO MEAS - LVPWD: 1.2 CM
BH CV ECHO MEAS - MED PEAK E' VEL: 8.5 CM/SEC
BH CV ECHO MEAS - MV DEC TIME: 0.09 MSEC
BH CV ECHO MEAS - MV E MAX VEL: 95.5 CM/SEC
BH CV ECHO MEAS - MV MAX PG: 4.3 MMHG
BH CV ECHO MEAS - MV MEAN PG: 1.57 MMHG
BH CV ECHO MEAS - MV V2 VTI: 22.5 CM
BH CV ECHO MEAS - MVA(VTI): 2.02 CM2
BH CV ECHO MEAS - PA ACC TIME: 0.08 SEC
BH CV ECHO MEAS - PA PR(ACCEL): 41 MMHG
BH CV ECHO MEAS - PA V2 MAX: 98.5 CM/SEC
BH CV ECHO MEAS - RAP SYSTOLE: 3 MMHG
BH CV ECHO MEAS - RVSP: 21 MMHG
BH CV ECHO MEAS - SV(LVOT): 45.5 ML
BH CV ECHO MEAS - SV(MOD-SP2): 44.4 ML
BH CV ECHO MEAS - SV(MOD-SP4): 40.6 ML
BH CV ECHO MEAS - TR MAX PG: 18.2 MMHG
BH CV ECHO MEAS - TR MAX VEL: 213.3 CM/SEC
BH CV ECHO MEASUREMENTS AVERAGE E/E' RATIO: 11.51
BH CV XLRA - RV BASE: 2.6 CM
BH CV XLRA - RV LENGTH: 6 CM
BH CV XLRA - RV MID: 2.06 CM
BH CV XLRA - TDI S': 10.2 CM/SEC
BUN SERPL-MCNC: 16 MG/DL (ref 8–23)
BUN/CREAT SERPL: 26.2 (ref 7–25)
CALCIUM SPEC-SCNC: 8.8 MG/DL (ref 8.6–10.5)
CHLORIDE SERPL-SCNC: 103 MMOL/L (ref 98–107)
CO2 SERPL-SCNC: 22 MMOL/L (ref 22–29)
CREAT SERPL-MCNC: 0.61 MG/DL (ref 0.57–1)
D-LACTATE SERPL-SCNC: 1.9 MMOL/L (ref 0.5–2)
DEPRECATED RDW RBC AUTO: 42.7 FL (ref 37–54)
DEPRECATED RDW RBC AUTO: 47.7 FL (ref 37–54)
EGFRCR SERPLBLD CKD-EPI 2021: 91.6 ML/MIN/1.73
EOSINOPHIL # BLD AUTO: 0 10*3/MM3 (ref 0–0.4)
EOSINOPHIL NFR BLD AUTO: 0 % (ref 0.3–6.2)
ERYTHROCYTE [DISTWIDTH] IN BLOOD BY AUTOMATED COUNT: 12.1 % (ref 12.3–15.4)
ERYTHROCYTE [DISTWIDTH] IN BLOOD BY AUTOMATED COUNT: 12.3 % (ref 12.3–15.4)
GLUCOSE SERPL-MCNC: 132 MG/DL (ref 65–99)
HCT VFR BLD AUTO: 30.9 % (ref 34–46.6)
HCT VFR BLD AUTO: 37.7 % (ref 34–46.6)
HCT VFR BLD AUTO: 44.3 % (ref 34–46.6)
HGB BLD-MCNC: 10.1 G/DL (ref 12–15.9)
HGB BLD-MCNC: 12.5 G/DL (ref 12–15.9)
HGB BLD-MCNC: 13.3 G/DL (ref 12–15.9)
IMM GRANULOCYTES # BLD AUTO: 0.06 10*3/MM3 (ref 0–0.05)
IMM GRANULOCYTES NFR BLD AUTO: 0.4 % (ref 0–0.5)
INR PPP: 1.02 (ref 0.84–1.13)
INR PPP: 1.18 (ref 0.84–1.13)
LV EF 2D ECHO EST: 60 %
LYMPHOCYTES # BLD AUTO: 0.63 10*3/MM3 (ref 0.7–3.1)
LYMPHOCYTES NFR BLD AUTO: 4.1 % (ref 19.6–45.3)
MAXIMAL PREDICTED HEART RATE: 142 BPM
MCH RBC QN AUTO: 31.2 PG (ref 26.6–33)
MCH RBC QN AUTO: 32.2 PG (ref 26.6–33)
MCHC RBC AUTO-ENTMCNC: 30 G/DL (ref 31.5–35.7)
MCHC RBC AUTO-ENTMCNC: 33.2 G/DL (ref 31.5–35.7)
MCV RBC AUTO: 107.3 FL (ref 79–97)
MCV RBC AUTO: 94 FL (ref 79–97)
MONOCYTES # BLD AUTO: 0.98 10*3/MM3 (ref 0.1–0.9)
MONOCYTES NFR BLD AUTO: 6.3 % (ref 5–12)
NEUTROPHILS NFR BLD AUTO: 13.84 10*3/MM3 (ref 1.7–7)
NEUTROPHILS NFR BLD AUTO: 89 % (ref 42.7–76)
NRBC BLD AUTO-RTO: 0 /100 WBC (ref 0–0.2)
PLATELET # BLD AUTO: 222 10*3/MM3 (ref 140–450)
PLATELET # BLD AUTO: 239 10*3/MM3 (ref 140–450)
PMV BLD AUTO: 10.2 FL (ref 6–12)
PMV BLD AUTO: 9.7 FL (ref 6–12)
POTASSIUM SERPL-SCNC: 4.4 MMOL/L (ref 3.5–5.2)
PROTHROMBIN TIME: 13.3 SECONDS (ref 11.4–14.4)
PROTHROMBIN TIME: 14.9 SECONDS (ref 11.4–14.4)
RBC # BLD AUTO: 4.01 10*6/MM3 (ref 3.77–5.28)
RBC # BLD AUTO: 4.13 10*6/MM3 (ref 3.77–5.28)
RH BLD: POSITIVE
SODIUM SERPL-SCNC: 137 MMOL/L (ref 136–145)
STRESS TARGET HR: 121 BPM
UFH PPP CHRO-ACNC: 0.1 IU/ML (ref 0.3–0.7)
UFH PPP CHRO-ACNC: 0.44 IU/ML (ref 0.3–0.7)
WBC NRBC COR # BLD: 10.87 10*3/MM3 (ref 3.4–10.8)
WBC NRBC COR # BLD: 15.54 10*3/MM3 (ref 3.4–10.8)

## 2022-05-16 PROCEDURE — 25010000002 FENTANYL CITRATE (PF) 50 MCG/ML SOLUTION: Performed by: NURSE ANESTHETIST, CERTIFIED REGISTERED

## 2022-05-16 PROCEDURE — 93306 TTE W/DOPPLER COMPLETE: CPT

## 2022-05-16 PROCEDURE — 99222 1ST HOSP IP/OBS MODERATE 55: CPT | Performed by: INTERNAL MEDICINE

## 2022-05-16 PROCEDURE — 85018 HEMOGLOBIN: CPT | Performed by: NURSE ANESTHETIST, CERTIFIED REGISTERED

## 2022-05-16 PROCEDURE — C1713 ANCHOR/SCREW BN/BN,TIS/BN: HCPCS | Performed by: ORTHOPAEDIC SURGERY

## 2022-05-16 PROCEDURE — 25010000002 CEFAZOLIN PER 500 MG: Performed by: ORTHOPAEDIC SURGERY

## 2022-05-16 PROCEDURE — 25010000002 PROPOFOL 10 MG/ML EMULSION

## 2022-05-16 PROCEDURE — 25010000002 PROTAMINE SULFATE PER 10 MG: Performed by: NURSE ANESTHETIST, CERTIFIED REGISTERED

## 2022-05-16 PROCEDURE — 85730 THROMBOPLASTIN TIME PARTIAL: CPT | Performed by: NURSE ANESTHETIST, CERTIFIED REGISTERED

## 2022-05-16 PROCEDURE — 76942 ECHO GUIDE FOR BIOPSY: CPT | Performed by: ORTHOPAEDIC SURGERY

## 2022-05-16 PROCEDURE — 93306 TTE W/DOPPLER COMPLETE: CPT | Performed by: INTERNAL MEDICINE

## 2022-05-16 PROCEDURE — 25010000002 DEXAMETHASONE SODIUM PHOSPHATE 10 MG/ML SOLUTION: Performed by: NURSE ANESTHETIST, CERTIFIED REGISTERED

## 2022-05-16 PROCEDURE — 76000 FLUOROSCOPY <1 HR PHYS/QHP: CPT

## 2022-05-16 PROCEDURE — 25010000002 ALBUMIN HUMAN 5% PER 50 ML: Performed by: NURSE ANESTHETIST, CERTIFIED REGISTERED

## 2022-05-16 PROCEDURE — 27245 TREAT THIGH FRACTURE: CPT | Performed by: PHYSICIAN ASSISTANT

## 2022-05-16 PROCEDURE — 25010000002 MORPHINE PER 10 MG: Performed by: INTERNAL MEDICINE

## 2022-05-16 PROCEDURE — 25010000002 NEOSTIGMINE 10 MG/10ML SOLUTION: Performed by: NURSE ANESTHETIST, CERTIFIED REGISTERED

## 2022-05-16 PROCEDURE — 80048 BASIC METABOLIC PNL TOTAL CA: CPT | Performed by: PHYSICIAN ASSISTANT

## 2022-05-16 PROCEDURE — 99232 SBSQ HOSP IP/OBS MODERATE 35: CPT | Performed by: INTERNAL MEDICINE

## 2022-05-16 PROCEDURE — 85610 PROTHROMBIN TIME: CPT | Performed by: NURSE ANESTHETIST, CERTIFIED REGISTERED

## 2022-05-16 PROCEDURE — 25010000002 ONDANSETRON PER 1 MG: Performed by: NURSE ANESTHETIST, CERTIFIED REGISTERED

## 2022-05-16 PROCEDURE — 27245 TREAT THIGH FRACTURE: CPT | Performed by: ORTHOPAEDIC SURGERY

## 2022-05-16 PROCEDURE — 85014 HEMATOCRIT: CPT | Performed by: NURSE ANESTHETIST, CERTIFIED REGISTERED

## 2022-05-16 PROCEDURE — 0 LIDOCAINE 1 % SOLUTION

## 2022-05-16 PROCEDURE — 83605 ASSAY OF LACTIC ACID: CPT | Performed by: PHYSICIAN ASSISTANT

## 2022-05-16 PROCEDURE — 0QS706Z REPOSITION LEFT UPPER FEMUR WITH INTRAMEDULLARY INTERNAL FIXATION DEVICE, OPEN APPROACH: ICD-10-PCS | Performed by: ORTHOPAEDIC SURGERY

## 2022-05-16 PROCEDURE — 85025 COMPLETE CBC W/AUTO DIFF WBC: CPT | Performed by: PHYSICIAN ASSISTANT

## 2022-05-16 PROCEDURE — 25010000002 MORPHINE PER 10 MG: Performed by: FAMILY MEDICINE

## 2022-05-16 PROCEDURE — 85520 HEPARIN ASSAY: CPT

## 2022-05-16 PROCEDURE — C1769 GUIDE WIRE: HCPCS | Performed by: ORTHOPAEDIC SURGERY

## 2022-05-16 PROCEDURE — 25010000002 CEFAZOLIN IN DEXTROSE 2-4 GM/100ML-% SOLUTION: Performed by: ORTHOPAEDIC SURGERY

## 2022-05-16 PROCEDURE — 25010000002 HEPARIN (PORCINE) PER 1000 UNITS: Performed by: PHYSICIAN ASSISTANT

## 2022-05-16 PROCEDURE — 25010000002 DEXAMETHASONE PER 1 MG

## 2022-05-16 PROCEDURE — 73552 X-RAY EXAM OF FEMUR 2/>: CPT

## 2022-05-16 PROCEDURE — P9041 ALBUMIN (HUMAN),5%, 50ML: HCPCS | Performed by: NURSE ANESTHETIST, CERTIFIED REGISTERED

## 2022-05-16 PROCEDURE — 99222 1ST HOSP IP/OBS MODERATE 55: CPT | Performed by: ORTHOPAEDIC SURGERY

## 2022-05-16 PROCEDURE — 25010000002 ROPIVACAINE PER 1 MG: Performed by: NURSE ANESTHETIST, CERTIFIED REGISTERED

## 2022-05-16 PROCEDURE — 85027 COMPLETE CBC AUTOMATED: CPT | Performed by: PHYSICIAN ASSISTANT

## 2022-05-16 DEVICE — LONG NAIL KIT R1.5, TI, LEFT
Type: IMPLANTABLE DEVICE | Site: FEMUR | Status: FUNCTIONAL
Brand: GAMMA

## 2022-05-16 DEVICE — K-WIRE: Type: IMPLANTABLE DEVICE | Site: FEMUR | Status: FUNCTIONAL

## 2022-05-16 DEVICE — CABL W/SLV DALLMILES BEAD 2MM: Type: IMPLANTABLE DEVICE | Site: FEMUR | Status: FUNCTIONAL

## 2022-05-16 DEVICE — LOCKING SCREW, FULLY THREADED: Type: IMPLANTABLE DEVICE | Site: FEMUR | Status: FUNCTIONAL

## 2022-05-16 DEVICE — LAG SCREW, TI
Type: IMPLANTABLE DEVICE | Site: FEMUR | Status: FUNCTIONAL
Brand: GAMMA

## 2022-05-16 RX ORDER — MAGNESIUM HYDROXIDE 1200 MG/15ML
LIQUID ORAL AS NEEDED
Status: DISCONTINUED | OUTPATIENT
Start: 2022-05-16 | End: 2022-05-16 | Stop reason: HOSPADM

## 2022-05-16 RX ORDER — GLYCOPYRROLATE 0.2 MG/ML
INJECTION INTRAMUSCULAR; INTRAVENOUS AS NEEDED
Status: DISCONTINUED | OUTPATIENT
Start: 2022-05-16 | End: 2022-05-16 | Stop reason: SURG

## 2022-05-16 RX ORDER — CHOLECALCIFEROL (VITAMIN D3) 125 MCG
5 CAPSULE ORAL NIGHTLY PRN
Status: DISCONTINUED | OUTPATIENT
Start: 2022-05-16 | End: 2022-05-21 | Stop reason: HOSPADM

## 2022-05-16 RX ORDER — FAMOTIDINE 10 MG/ML
20 INJECTION, SOLUTION INTRAVENOUS ONCE
Status: DISCONTINUED | OUTPATIENT
Start: 2022-05-16 | End: 2022-05-16 | Stop reason: HOSPADM

## 2022-05-16 RX ORDER — SODIUM CHLORIDE 0.9 % (FLUSH) 0.9 %
10 SYRINGE (ML) INJECTION EVERY 12 HOURS SCHEDULED
Status: DISCONTINUED | OUTPATIENT
Start: 2022-05-16 | End: 2022-05-19

## 2022-05-16 RX ORDER — SODIUM CHLORIDE 0.9 % (FLUSH) 0.9 %
3-10 SYRINGE (ML) INJECTION AS NEEDED
Status: DISCONTINUED | OUTPATIENT
Start: 2022-05-16 | End: 2022-05-16

## 2022-05-16 RX ORDER — ESMOLOL HYDROCHLORIDE 10 MG/ML
INJECTION INTRAVENOUS AS NEEDED
Status: DISCONTINUED | OUTPATIENT
Start: 2022-05-16 | End: 2022-05-16 | Stop reason: SURG

## 2022-05-16 RX ORDER — ASPIRIN 81 MG/1
81 TABLET ORAL EVERY 12 HOURS SCHEDULED
Status: DISCONTINUED | OUTPATIENT
Start: 2022-05-17 | End: 2022-05-21 | Stop reason: HOSPADM

## 2022-05-16 RX ORDER — BUPIVACAINE HCL/0.9 % NACL/PF 0.125 %
PLASTIC BAG, INJECTION (ML) EPIDURAL AS NEEDED
Status: DISCONTINUED | OUTPATIENT
Start: 2022-05-16 | End: 2022-05-16 | Stop reason: SURG

## 2022-05-16 RX ORDER — SODIUM CHLORIDE 0.9 % (FLUSH) 0.9 %
3 SYRINGE (ML) INJECTION EVERY 12 HOURS SCHEDULED
Status: DISCONTINUED | OUTPATIENT
Start: 2022-05-16 | End: 2022-05-21 | Stop reason: HOSPADM

## 2022-05-16 RX ORDER — HYDROMORPHONE HYDROCHLORIDE 1 MG/ML
0.5 INJECTION, SOLUTION INTRAMUSCULAR; INTRAVENOUS; SUBCUTANEOUS
Status: DISCONTINUED | OUTPATIENT
Start: 2022-05-16 | End: 2022-05-16

## 2022-05-16 RX ORDER — LIDOCAINE HYDROCHLORIDE 10 MG/ML
0.5 INJECTION, SOLUTION EPIDURAL; INFILTRATION; INTRACAUDAL; PERINEURAL ONCE AS NEEDED
Status: COMPLETED | OUTPATIENT
Start: 2022-05-16 | End: 2022-05-16

## 2022-05-16 RX ORDER — DEXAMETHASONE SODIUM PHOSPHATE 4 MG/ML
INJECTION, SOLUTION INTRA-ARTICULAR; INTRALESIONAL; INTRAMUSCULAR; INTRAVENOUS; SOFT TISSUE AS NEEDED
Status: DISCONTINUED | OUTPATIENT
Start: 2022-05-16 | End: 2022-05-16 | Stop reason: SURG

## 2022-05-16 RX ORDER — FENTANYL CITRATE 50 UG/ML
INJECTION, SOLUTION INTRAMUSCULAR; INTRAVENOUS AS NEEDED
Status: DISCONTINUED | OUTPATIENT
Start: 2022-05-16 | End: 2022-05-16 | Stop reason: SURG

## 2022-05-16 RX ORDER — SODIUM CHLORIDE 0.9 % (FLUSH) 0.9 %
10 SYRINGE (ML) INJECTION AS NEEDED
Status: DISCONTINUED | OUTPATIENT
Start: 2022-05-16 | End: 2022-05-16 | Stop reason: HOSPADM

## 2022-05-16 RX ORDER — PROMETHAZINE HYDROCHLORIDE 25 MG/1
25 TABLET ORAL ONCE AS NEEDED
Status: DISCONTINUED | OUTPATIENT
Start: 2022-05-16 | End: 2022-05-16

## 2022-05-16 RX ORDER — NALOXONE HCL 0.4 MG/ML
0.4 VIAL (ML) INJECTION AS NEEDED
Status: DISCONTINUED | OUTPATIENT
Start: 2022-05-16 | End: 2022-05-16

## 2022-05-16 RX ORDER — FENTANYL CITRATE 50 UG/ML
50 INJECTION, SOLUTION INTRAMUSCULAR; INTRAVENOUS
Status: DISCONTINUED | OUTPATIENT
Start: 2022-05-16 | End: 2022-05-16

## 2022-05-16 RX ORDER — SODIUM CHLORIDE 0.9 % (FLUSH) 0.9 %
10 SYRINGE (ML) INJECTION AS NEEDED
Status: DISCONTINUED | OUTPATIENT
Start: 2022-05-16 | End: 2022-05-19

## 2022-05-16 RX ORDER — ACETAMINOPHEN 325 MG/1
650 TABLET ORAL EVERY 4 HOURS PRN
Status: DISCONTINUED | OUTPATIENT
Start: 2022-05-16 | End: 2022-05-21 | Stop reason: HOSPADM

## 2022-05-16 RX ORDER — ROPIVACAINE HYDROCHLORIDE 5 MG/ML
INJECTION, SOLUTION EPIDURAL; INFILTRATION; PERINEURAL
Status: COMPLETED | OUTPATIENT
Start: 2022-05-16 | End: 2022-05-16

## 2022-05-16 RX ORDER — NEOSTIGMINE METHYLSULFATE 1 MG/ML
INJECTION, SOLUTION INTRAVENOUS AS NEEDED
Status: DISCONTINUED | OUTPATIENT
Start: 2022-05-16 | End: 2022-05-16 | Stop reason: SURG

## 2022-05-16 RX ORDER — SODIUM CHLORIDE 0.9 % (FLUSH) 0.9 %
10 SYRINGE (ML) INJECTION AS NEEDED
Status: DISCONTINUED | OUTPATIENT
Start: 2022-05-16 | End: 2022-05-21 | Stop reason: HOSPADM

## 2022-05-16 RX ORDER — PROMETHAZINE HYDROCHLORIDE 25 MG/1
25 SUPPOSITORY RECTAL ONCE AS NEEDED
Status: DISCONTINUED | OUTPATIENT
Start: 2022-05-16 | End: 2022-05-16

## 2022-05-16 RX ORDER — MIDAZOLAM HYDROCHLORIDE 1 MG/ML
0.5 INJECTION INTRAMUSCULAR; INTRAVENOUS
Status: DISCONTINUED | OUTPATIENT
Start: 2022-05-16 | End: 2022-05-16 | Stop reason: HOSPADM

## 2022-05-16 RX ORDER — SODIUM CHLORIDE, SODIUM LACTATE, POTASSIUM CHLORIDE, CALCIUM CHLORIDE 600; 310; 30; 20 MG/100ML; MG/100ML; MG/100ML; MG/100ML
9 INJECTION, SOLUTION INTRAVENOUS CONTINUOUS
Status: DISCONTINUED | OUTPATIENT
Start: 2022-05-16 | End: 2022-05-19

## 2022-05-16 RX ORDER — PROTAMINE SULFATE 10 MG/ML
INJECTION, SOLUTION INTRAVENOUS AS NEEDED
Status: DISCONTINUED | OUTPATIENT
Start: 2022-05-16 | End: 2022-05-16 | Stop reason: SURG

## 2022-05-16 RX ORDER — SODIUM CHLORIDE 0.9 % (FLUSH) 0.9 %
10 SYRINGE (ML) INJECTION EVERY 12 HOURS SCHEDULED
Status: DISCONTINUED | OUTPATIENT
Start: 2022-05-16 | End: 2022-05-16 | Stop reason: HOSPADM

## 2022-05-16 RX ORDER — ROCURONIUM BROMIDE 10 MG/ML
INJECTION, SOLUTION INTRAVENOUS AS NEEDED
Status: DISCONTINUED | OUTPATIENT
Start: 2022-05-16 | End: 2022-05-16 | Stop reason: SURG

## 2022-05-16 RX ORDER — DILTIAZEM HCL IN NACL,ISO-OSM 125 MG/125
5-15 PLASTIC BAG, INJECTION (ML) INTRAVENOUS
Status: DISCONTINUED | OUTPATIENT
Start: 2022-05-16 | End: 2022-05-18

## 2022-05-16 RX ORDER — TRANEXAMIC ACID 10 MG/ML
1000 INJECTION, SOLUTION INTRAVENOUS ONCE
Status: COMPLETED | OUTPATIENT
Start: 2022-05-16 | End: 2022-05-16

## 2022-05-16 RX ORDER — BUPIVACAINE HCL/0.9 % NACL/PF 0.1 %
2 PLASTIC BAG, INJECTION (ML) EPIDURAL EVERY 8 HOURS
Status: COMPLETED | OUTPATIENT
Start: 2022-05-16 | End: 2022-05-17

## 2022-05-16 RX ORDER — LABETALOL HYDROCHLORIDE 5 MG/ML
5 INJECTION, SOLUTION INTRAVENOUS
Status: DISCONTINUED | OUTPATIENT
Start: 2022-05-16 | End: 2022-05-16

## 2022-05-16 RX ORDER — CEFAZOLIN SODIUM 2 G/100ML
2 INJECTION, SOLUTION INTRAVENOUS
Status: COMPLETED | OUTPATIENT
Start: 2022-05-16 | End: 2022-05-16

## 2022-05-16 RX ORDER — FAMOTIDINE 20 MG/1
20 TABLET, FILM COATED ORAL ONCE
Status: COMPLETED | OUTPATIENT
Start: 2022-05-16 | End: 2022-05-16

## 2022-05-16 RX ORDER — MORPHINE SULFATE 4 MG/ML
3 INJECTION, SOLUTION INTRAMUSCULAR; INTRAVENOUS
Status: DISCONTINUED | OUTPATIENT
Start: 2022-05-16 | End: 2022-05-21 | Stop reason: HOSPADM

## 2022-05-16 RX ORDER — MORPHINE SULFATE 4 MG/ML
3 INJECTION, SOLUTION INTRAMUSCULAR; INTRAVENOUS EVERY 4 HOURS PRN
Status: DISCONTINUED | OUTPATIENT
Start: 2022-05-16 | End: 2022-05-16

## 2022-05-16 RX ORDER — HYDRALAZINE HYDROCHLORIDE 20 MG/ML
5 INJECTION INTRAMUSCULAR; INTRAVENOUS
Status: DISCONTINUED | OUTPATIENT
Start: 2022-05-16 | End: 2022-05-16

## 2022-05-16 RX ORDER — OXYCODONE HYDROCHLORIDE 5 MG/1
10 TABLET ORAL EVERY 4 HOURS PRN
Status: DISCONTINUED | OUTPATIENT
Start: 2022-05-16 | End: 2022-05-21 | Stop reason: HOSPADM

## 2022-05-16 RX ORDER — PROPOFOL 10 MG/ML
VIAL (ML) INTRAVENOUS AS NEEDED
Status: DISCONTINUED | OUTPATIENT
Start: 2022-05-16 | End: 2022-05-16 | Stop reason: SURG

## 2022-05-16 RX ORDER — ACETAMINOPHEN 500 MG
1000 TABLET ORAL EVERY 8 HOURS
Status: DISCONTINUED | OUTPATIENT
Start: 2022-05-16 | End: 2022-05-21 | Stop reason: HOSPADM

## 2022-05-16 RX ORDER — ONDANSETRON 2 MG/ML
INJECTION INTRAMUSCULAR; INTRAVENOUS AS NEEDED
Status: DISCONTINUED | OUTPATIENT
Start: 2022-05-16 | End: 2022-05-16 | Stop reason: SURG

## 2022-05-16 RX ORDER — OXYCODONE HYDROCHLORIDE 5 MG/1
5 TABLET ORAL EVERY 4 HOURS PRN
Status: DISCONTINUED | OUTPATIENT
Start: 2022-05-16 | End: 2022-05-21 | Stop reason: HOSPADM

## 2022-05-16 RX ORDER — ONDANSETRON 2 MG/ML
4 INJECTION INTRAMUSCULAR; INTRAVENOUS ONCE AS NEEDED
Status: DISCONTINUED | OUTPATIENT
Start: 2022-05-16 | End: 2022-05-16

## 2022-05-16 RX ORDER — OXYCODONE HYDROCHLORIDE 5 MG/1
5 TABLET ORAL EVERY 4 HOURS PRN
Status: DISCONTINUED | OUTPATIENT
Start: 2022-05-16 | End: 2022-05-16 | Stop reason: SDUPTHER

## 2022-05-16 RX ORDER — DEXAMETHASONE SODIUM PHOSPHATE 10 MG/ML
INJECTION, SOLUTION INTRAMUSCULAR; INTRAVENOUS
Status: COMPLETED | OUTPATIENT
Start: 2022-05-16 | End: 2022-05-16

## 2022-05-16 RX ORDER — HYDROCODONE BITARTRATE AND ACETAMINOPHEN 5; 325 MG/1; MG/1
1 TABLET ORAL ONCE AS NEEDED
Status: DISCONTINUED | OUTPATIENT
Start: 2022-05-16 | End: 2022-05-16

## 2022-05-16 RX ORDER — LIDOCAINE HYDROCHLORIDE 10 MG/ML
INJECTION, SOLUTION INFILTRATION; PERINEURAL AS NEEDED
Status: DISCONTINUED | OUTPATIENT
Start: 2022-05-16 | End: 2022-05-16 | Stop reason: SURG

## 2022-05-16 RX ORDER — ALBUMIN, HUMAN INJ 5% 5 %
SOLUTION INTRAVENOUS CONTINUOUS PRN
Status: DISCONTINUED | OUTPATIENT
Start: 2022-05-16 | End: 2022-05-16 | Stop reason: SURG

## 2022-05-16 RX ORDER — SODIUM CHLORIDE 0.9 % (FLUSH) 0.9 %
3 SYRINGE (ML) INJECTION EVERY 12 HOURS SCHEDULED
Status: DISCONTINUED | OUTPATIENT
Start: 2022-05-16 | End: 2022-05-16

## 2022-05-16 RX ORDER — IPRATROPIUM BROMIDE AND ALBUTEROL SULFATE 2.5; .5 MG/3ML; MG/3ML
3 SOLUTION RESPIRATORY (INHALATION) ONCE AS NEEDED
Status: DISCONTINUED | OUTPATIENT
Start: 2022-05-16 | End: 2022-05-16

## 2022-05-16 RX ADMIN — FENTANYL CITRATE 50 MCG: 50 INJECTION, SOLUTION INTRAMUSCULAR; INTRAVENOUS at 16:28

## 2022-05-16 RX ADMIN — FENTANYL CITRATE 50 MCG: 50 INJECTION, SOLUTION INTRAMUSCULAR; INTRAVENOUS at 16:49

## 2022-05-16 RX ADMIN — ACETAMINOPHEN 1000 MG: 500 TABLET ORAL at 21:05

## 2022-05-16 RX ADMIN — SODIUM CHLORIDE, POTASSIUM CHLORIDE, SODIUM LACTATE AND CALCIUM CHLORIDE: 600; 310; 30; 20 INJECTION, SOLUTION INTRAVENOUS at 15:07

## 2022-05-16 RX ADMIN — CEFAZOLIN SODIUM 2 G: 2 INJECTION, SOLUTION INTRAVENOUS at 12:57

## 2022-05-16 RX ADMIN — CEFAZOLIN 2 G: 10 INJECTION, POWDER, FOR SOLUTION INTRAVENOUS at 21:05

## 2022-05-16 RX ADMIN — MORPHINE SULFATE 3 MG: 4 INJECTION, SOLUTION INTRAMUSCULAR; INTRAVENOUS at 07:47

## 2022-05-16 RX ADMIN — Medication 100 MCG: at 15:56

## 2022-05-16 RX ADMIN — ROCURONIUM BROMIDE 50 MG: 10 INJECTION, SOLUTION INTRAVENOUS at 12:42

## 2022-05-16 RX ADMIN — GLYCOPYRROLATE 0.4 MG: 0.2 INJECTION INTRAMUSCULAR; INTRAVENOUS at 16:48

## 2022-05-16 RX ADMIN — PROTAMINE SULFATE 50 MG: 10 INJECTION, SOLUTION INTRAVENOUS at 12:24

## 2022-05-16 RX ADMIN — LIDOCAINE HYDROCHLORIDE 50 MG: 10 INJECTION, SOLUTION INFILTRATION; PERINEURAL at 12:42

## 2022-05-16 RX ADMIN — METOPROLOL TARTRATE 1 MG: 5 INJECTION INTRAVENOUS at 16:03

## 2022-05-16 RX ADMIN — MORPHINE SULFATE 3 MG: 4 INJECTION, SOLUTION INTRAMUSCULAR; INTRAVENOUS at 04:29

## 2022-05-16 RX ADMIN — HEPARIN SODIUM 11 UNITS/KG/HR: 5000 INJECTION, SOLUTION INTRAVENOUS; SUBCUTANEOUS at 00:43

## 2022-05-16 RX ADMIN — DEXAMETHASONE SODIUM PHOSPHATE 4 MG: 4 INJECTION, SOLUTION INTRA-ARTICULAR; INTRALESIONAL; INTRAMUSCULAR; INTRAVENOUS; SOFT TISSUE at 12:42

## 2022-05-16 RX ADMIN — HEPARIN SODIUM 4000 UNITS: 1000 INJECTION INTRAVENOUS; SUBCUTANEOUS at 00:52

## 2022-05-16 RX ADMIN — FAMOTIDINE 20 MG: 20 TABLET ORAL at 11:05

## 2022-05-16 RX ADMIN — NEOSTIGMINE METHYLSULFATE 3 MG: 0.5 INJECTION INTRAVENOUS at 16:48

## 2022-05-16 RX ADMIN — TRANEXAMIC ACID 1000 MG: 10 INJECTION, SOLUTION INTRAVENOUS at 16:32

## 2022-05-16 RX ADMIN — DEXAMETHASONE SODIUM PHOSPHATE 4 MG: 10 INJECTION, SOLUTION INTRAMUSCULAR; INTRAVENOUS at 11:29

## 2022-05-16 RX ADMIN — ESMOLOL HYDROCHLORIDE 30 MG: 10 INJECTION, SOLUTION INTRAVENOUS at 12:42

## 2022-05-16 RX ADMIN — ALBUMIN HUMAN: 0.05 INJECTION, SOLUTION INTRAVENOUS at 16:48

## 2022-05-16 RX ADMIN — TRANEXAMIC ACID 1000 MG: 10 INJECTION, SOLUTION INTRAVENOUS at 12:57

## 2022-05-16 RX ADMIN — ROPIVACAINE HYDROCHLORIDE 30 ML: 5 INJECTION, SOLUTION EPIDURAL; INFILTRATION; PERINEURAL at 11:29

## 2022-05-16 RX ADMIN — METOPROLOL TARTRATE 1 MG: 5 INJECTION INTRAVENOUS at 16:07

## 2022-05-16 RX ADMIN — LIDOCAINE HYDROCHLORIDE 0.5 ML: 10 INJECTION, SOLUTION EPIDURAL; INFILTRATION; INTRACAUDAL; PERINEURAL at 10:42

## 2022-05-16 RX ADMIN — PROPOFOL 150 MG: 10 INJECTION, EMULSION INTRAVENOUS at 12:42

## 2022-05-16 RX ADMIN — ESMOLOL HYDROCHLORIDE 10 MG: 10 INJECTION, SOLUTION INTRAVENOUS at 15:15

## 2022-05-16 RX ADMIN — SODIUM CHLORIDE, POTASSIUM CHLORIDE, SODIUM LACTATE AND CALCIUM CHLORIDE 9 ML/HR: 600; 310; 30; 20 INJECTION, SOLUTION INTRAVENOUS at 10:42

## 2022-05-16 RX ADMIN — Medication 200 MCG: at 14:49

## 2022-05-16 RX ADMIN — ONDANSETRON 4 MG: 2 INJECTION INTRAMUSCULAR; INTRAVENOUS at 16:05

## 2022-05-16 RX ADMIN — ALBUMIN HUMAN: 0.05 INJECTION, SOLUTION INTRAVENOUS at 15:58

## 2022-05-16 NOTE — ANESTHESIA PROCEDURE NOTES
Airway  Urgency: elective    Date/Time: 5/16/2022 12:52 PM  Airway not difficult    General Information and Staff    Patient location during procedure: OR  CRNA/CAA: Tasneem Bolden CRNA    Indications and Patient Condition  Indications for airway management: airway protection    Preoxygenated: yes  MILS not maintained throughout  Mask difficulty assessment: 1 - vent by mask    Final Airway Details  Final airway type: endotracheal airway      Successful airway: ETT  Cuffed: yes   Successful intubation technique: direct laryngoscopy  Endotracheal tube insertion site: oral  Blade: [place]  Blade size: 3  ETT size (mm): 7.0  Cormack-Lehane Classification: grade IIb - view of arytenoids or posterior of glottis only  Placement verified by: chest auscultation and capnometry   Measured from: lips  ETT/EBT  to lips (cm): 20  Number of attempts at approach: 1  Assessment: lips, teeth, and gum same as pre-op and atraumatic intubation    Additional Comments  Negative epigastric sounds, Breath sound equal bilaterally with symmetric chest rise and fall

## 2022-05-16 NOTE — ANESTHESIA PROCEDURE NOTES
SS [facility]      Patient reassessed immediately prior to procedure    Patient location during procedure: pre-op  Reason for block: procedure for pain and at surgeon's request  Performed by  CRNA/CAA: Susan Santiago CRNA  Assisted by: Leann Hollis RN  Preanesthetic Checklist  Completed: patient identified, IV checked, site marked, risks and benefits discussed, surgical consent, monitors and equipment checked, pre-op evaluation and timeout performed  Prep:  Pt Position: supine  Sterile barriers:cap, gloves and mask  Prep: ChloraPrep  Patient monitoring: blood pressure monitoring, continuous pulse oximetry and EKG  Procedure  Performed under: local infiltration  Guidance:ultrasound guided  Images:still images obtained, printed/placed on chart    Laterality:left  Block Type:fascia iliaca compartment  Injection Technique:single-shot  Needle Type:echogenic and short-bevel  Needle Gauge:20 G  Resistance on Injection: none  Catheter size: 20g.    Medications Used: dexamethasone sodium phosphate injection, 4 mg  ropivacaine (NAROPIN) 0.5 % injection, 30 mL  Med administered at 5/16/2022 11:29 AM      Medications  Preservative Free Saline:30ml    Post Assessment  Injection Assessment: negative aspiration for heme, no paresthesia on injection and incremental injection  Patient Tolerance:comfortable throughout block  Complications:no  Additional Notes  Procedure:             Pt placed in supine position.   The insertion site was prepped in sterile fashion with Chlorapreop.  Analgesia was provided by skin infiltration at insertion site with Lidocaine 1% 5 mls.  A B-Juarez 20g , 4 inch echogenic needle was advance In-plane under ultrasound guidance. The   Anterior superior Iliac crest was initially visualized and the probe was directed slightly medially and slightly towards the umbilicus.  The course of the needle was tracked over the sartorius muscle through the fascia Iliacus and into the anterior portion of the Iliacus muscle.   Major vessels where identified and avoided as where structures of the peritoneal cavity.  LA injection was made incrementally in 1-5ml amounts spread was visualized superiorly below fascia iliacus.  Injection was completed with negative aspiration of blood and negative intravascular injection.  Injection pressures where normal or minimal resistance.

## 2022-05-16 NOTE — ANESTHESIA PREPROCEDURE EVALUATION
Anesthesia Evaluation     Patient summary reviewed and Nursing notes reviewed   no history of anesthetic complications:  NPO Solid Status: > 8 hours  NPO Liquid Status: > 2 hours           Airway   Mallampati: II  TM distance: >3 FB  Neck ROM: full  No difficulty expected  Dental - normal exam     Pulmonary - normal exam    breath sounds clear to auscultation  Cardiovascular     ECG reviewed  Rhythm: irregular  Rate: normal    (+) dysrhythmias (New onset Afib) Atrial Fib,       Neuro/Psych  (+) CVA,    GI/Hepatic/Renal/Endo    (+) obesity,       Musculoskeletal     Abdominal    Substance History      OB/GYN          Other   arthritis,    history of cancer (Endometrial ca)                    Anesthesia Plan    ASA 3     general with block   (Left single shot FICB for post-operative analgesia per request of Dr. Contreras.  Time out performed to verify patient, surgeon, and surgical site.    Awaiting echo to evaluate IVETT for thrombosis)  intravenous induction     Anesthetic plan, all risks, benefits, and alternatives have been provided, discussed and informed consent has been obtained with: patient.    Plan discussed with CRNA.        CODE STATUS:    Level Of Support Discussed With: Patient  Code Status (Patient has no pulse and is not breathing): CPR (Attempt to Resuscitate)  Medical Interventions (Patient has pulse or is breathing): Full Support

## 2022-05-17 LAB
ANION GAP SERPL CALCULATED.3IONS-SCNC: 11 MMOL/L (ref 5–15)
BASOPHILS # BLD AUTO: 0.01 10*3/MM3 (ref 0–0.2)
BASOPHILS NFR BLD AUTO: 0.1 % (ref 0–1.5)
BILIRUB UR QL STRIP: NEGATIVE
BUN SERPL-MCNC: 19 MG/DL (ref 8–23)
BUN/CREAT SERPL: 22.4 (ref 7–25)
CALCIUM SPEC-SCNC: 8.2 MG/DL (ref 8.6–10.5)
CHLORIDE SERPL-SCNC: 102 MMOL/L (ref 98–107)
CHOLEST SERPL-MCNC: 137 MG/DL (ref 0–200)
CLARITY UR: CLEAR
CO2 SERPL-SCNC: 26 MMOL/L (ref 22–29)
COLOR UR: YELLOW
CREAT SERPL-MCNC: 0.85 MG/DL (ref 0.57–1)
DEPRECATED RDW RBC AUTO: 43.3 FL (ref 37–54)
EGFRCR SERPLBLD CKD-EPI 2021: 70.2 ML/MIN/1.73
EOSINOPHIL # BLD AUTO: 0 10*3/MM3 (ref 0–0.4)
EOSINOPHIL NFR BLD AUTO: 0 % (ref 0.3–6.2)
ERYTHROCYTE [DISTWIDTH] IN BLOOD BY AUTOMATED COUNT: 12.5 % (ref 12.3–15.4)
GLUCOSE SERPL-MCNC: 148 MG/DL (ref 65–99)
GLUCOSE UR STRIP-MCNC: NEGATIVE MG/DL
HCT VFR BLD AUTO: 25 % (ref 34–46.6)
HDLC SERPL-MCNC: 42 MG/DL (ref 40–60)
HGB BLD-MCNC: 8.3 G/DL (ref 12–15.9)
HGB UR QL STRIP.AUTO: NEGATIVE
IMM GRANULOCYTES # BLD AUTO: 0.04 10*3/MM3 (ref 0–0.05)
IMM GRANULOCYTES NFR BLD AUTO: 0.3 % (ref 0–0.5)
KETONES UR QL STRIP: NEGATIVE
LDLC SERPL CALC-MCNC: 79 MG/DL (ref 0–100)
LDLC/HDLC SERPL: 1.87 {RATIO}
LEUKOCYTE ESTERASE UR QL STRIP.AUTO: NEGATIVE
LYMPHOCYTES # BLD AUTO: 0.79 10*3/MM3 (ref 0.7–3.1)
LYMPHOCYTES NFR BLD AUTO: 6.6 % (ref 19.6–45.3)
MAGNESIUM SERPL-MCNC: 2 MG/DL (ref 1.6–2.4)
MCH RBC QN AUTO: 32 PG (ref 26.6–33)
MCHC RBC AUTO-ENTMCNC: 33.2 G/DL (ref 31.5–35.7)
MCV RBC AUTO: 96.5 FL (ref 79–97)
MONOCYTES # BLD AUTO: 1.77 10*3/MM3 (ref 0.1–0.9)
MONOCYTES NFR BLD AUTO: 14.9 % (ref 5–12)
NEUTROPHILS NFR BLD AUTO: 78.1 % (ref 42.7–76)
NEUTROPHILS NFR BLD AUTO: 9.28 10*3/MM3 (ref 1.7–7)
NITRITE UR QL STRIP: NEGATIVE
NRBC BLD AUTO-RTO: 0 /100 WBC (ref 0–0.2)
PH UR STRIP.AUTO: 6.5 [PH] (ref 5–8)
PLATELET # BLD AUTO: 183 10*3/MM3 (ref 140–450)
PMV BLD AUTO: 10.4 FL (ref 6–12)
POTASSIUM SERPL-SCNC: 4.3 MMOL/L (ref 3.5–5.2)
PROT UR QL STRIP: NEGATIVE
RBC # BLD AUTO: 2.59 10*6/MM3 (ref 3.77–5.28)
SODIUM SERPL-SCNC: 139 MMOL/L (ref 136–145)
SP GR UR STRIP: 1.01 (ref 1–1.03)
TRIGL SERPL-MCNC: 82 MG/DL (ref 0–150)
UROBILINOGEN UR QL STRIP: NORMAL
VLDLC SERPL-MCNC: 16 MG/DL (ref 5–40)
WBC NRBC COR # BLD: 11.89 10*3/MM3 (ref 3.4–10.8)

## 2022-05-17 PROCEDURE — 80061 LIPID PANEL: CPT | Performed by: ORTHOPAEDIC SURGERY

## 2022-05-17 PROCEDURE — 83735 ASSAY OF MAGNESIUM: CPT | Performed by: ORTHOPAEDIC SURGERY

## 2022-05-17 PROCEDURE — 80048 BASIC METABOLIC PNL TOTAL CA: CPT | Performed by: ORTHOPAEDIC SURGERY

## 2022-05-17 PROCEDURE — 81003 URINALYSIS AUTO W/O SCOPE: CPT | Performed by: ORTHOPAEDIC SURGERY

## 2022-05-17 PROCEDURE — P9016 RBC LEUKOCYTES REDUCED: HCPCS

## 2022-05-17 PROCEDURE — 97116 GAIT TRAINING THERAPY: CPT

## 2022-05-17 PROCEDURE — 93010 ELECTROCARDIOGRAM REPORT: CPT | Performed by: INTERNAL MEDICINE

## 2022-05-17 PROCEDURE — 93005 ELECTROCARDIOGRAM TRACING: CPT | Performed by: NURSE PRACTITIONER

## 2022-05-17 PROCEDURE — 99024 POSTOP FOLLOW-UP VISIT: CPT | Performed by: ORTHOPAEDIC SURGERY

## 2022-05-17 PROCEDURE — 97166 OT EVAL MOD COMPLEX 45 MIN: CPT

## 2022-05-17 PROCEDURE — 36430 TRANSFUSION BLD/BLD COMPNT: CPT

## 2022-05-17 PROCEDURE — 86900 BLOOD TYPING SEROLOGIC ABO: CPT

## 2022-05-17 PROCEDURE — 99232 SBSQ HOSP IP/OBS MODERATE 35: CPT | Performed by: INTERNAL MEDICINE

## 2022-05-17 PROCEDURE — 97162 PT EVAL MOD COMPLEX 30 MIN: CPT

## 2022-05-17 PROCEDURE — 25010000002 CEFAZOLIN PER 500 MG: Performed by: ORTHOPAEDIC SURGERY

## 2022-05-17 PROCEDURE — 85025 COMPLETE CBC W/AUTO DIFF WBC: CPT | Performed by: ORTHOPAEDIC SURGERY

## 2022-05-17 PROCEDURE — 97110 THERAPEUTIC EXERCISES: CPT

## 2022-05-17 RX ADMIN — METOPROLOL TARTRATE 25 MG: 25 TABLET, FILM COATED ORAL at 11:09

## 2022-05-17 RX ADMIN — CEFAZOLIN 2 G: 10 INJECTION, POWDER, FOR SOLUTION INTRAVENOUS at 05:08

## 2022-05-17 RX ADMIN — Medication 5 MG/HR: at 22:07

## 2022-05-17 RX ADMIN — ACETAMINOPHEN 1000 MG: 500 TABLET ORAL at 05:07

## 2022-05-17 RX ADMIN — ASPIRIN 81 MG: 81 TABLET, COATED ORAL at 20:56

## 2022-05-17 RX ADMIN — ASPIRIN 81 MG: 81 TABLET, COATED ORAL at 09:00

## 2022-05-17 RX ADMIN — METOPROLOL TARTRATE 25 MG: 25 TABLET, FILM COATED ORAL at 20:56

## 2022-05-17 RX ADMIN — ACETAMINOPHEN 1000 MG: 500 TABLET ORAL at 20:55

## 2022-05-17 RX ADMIN — Medication 10 ML: at 20:56

## 2022-05-17 RX ADMIN — ACETAMINOPHEN 1000 MG: 500 TABLET ORAL at 13:02

## 2022-05-17 NOTE — ADDENDUM NOTE
Addendum  created 05/17/22 0847 by Dustin Dillon CRNA    Delete clinical note, Pend clinical note

## 2022-05-18 LAB
ANION GAP SERPL CALCULATED.3IONS-SCNC: 10 MMOL/L (ref 5–15)
BH BB BLOOD EXPIRATION DATE: NORMAL
BH BB BLOOD TYPE BARCODE: 9500
BH BB DISPENSE STATUS: NORMAL
BH BB PRODUCT CODE: NORMAL
BH BB UNIT NUMBER: NORMAL
BUN SERPL-MCNC: 29 MG/DL (ref 8–23)
BUN/CREAT SERPL: 33 (ref 7–25)
CALCIUM SPEC-SCNC: 9.1 MG/DL (ref 8.6–10.5)
CHLORIDE SERPL-SCNC: 103 MMOL/L (ref 98–107)
CO2 SERPL-SCNC: 26 MMOL/L (ref 22–29)
CREAT SERPL-MCNC: 0.88 MG/DL (ref 0.57–1)
CROSSMATCH INTERPRETATION: NORMAL
DEPRECATED RDW RBC AUTO: 48.6 FL (ref 37–54)
EGFRCR SERPLBLD CKD-EPI 2021: 67.4 ML/MIN/1.73
ERYTHROCYTE [DISTWIDTH] IN BLOOD BY AUTOMATED COUNT: 13.8 % (ref 12.3–15.4)
GLUCOSE SERPL-MCNC: 137 MG/DL (ref 65–99)
HCT VFR BLD AUTO: 26.2 % (ref 34–46.6)
HGB BLD-MCNC: 8.7 G/DL (ref 12–15.9)
MCH RBC QN AUTO: 32.1 PG (ref 26.6–33)
MCHC RBC AUTO-ENTMCNC: 33.2 G/DL (ref 31.5–35.7)
MCV RBC AUTO: 96.7 FL (ref 79–97)
PLATELET # BLD AUTO: 180 10*3/MM3 (ref 140–450)
PMV BLD AUTO: 10.4 FL (ref 6–12)
POTASSIUM SERPL-SCNC: 4.3 MMOL/L (ref 3.5–5.2)
QT INTERVAL: 366 MS
QTC INTERVAL: 414 MS
RBC # BLD AUTO: 2.71 10*6/MM3 (ref 3.77–5.28)
SODIUM SERPL-SCNC: 139 MMOL/L (ref 136–145)
UNIT  ABO: NORMAL
UNIT  RH: NORMAL
WBC NRBC COR # BLD: 14.43 10*3/MM3 (ref 3.4–10.8)

## 2022-05-18 PROCEDURE — 99024 POSTOP FOLLOW-UP VISIT: CPT | Performed by: ORTHOPAEDIC SURGERY

## 2022-05-18 PROCEDURE — 97530 THERAPEUTIC ACTIVITIES: CPT

## 2022-05-18 PROCEDURE — 93005 ELECTROCARDIOGRAM TRACING: CPT | Performed by: PHYSICIAN ASSISTANT

## 2022-05-18 PROCEDURE — 99232 SBSQ HOSP IP/OBS MODERATE 35: CPT | Performed by: INTERNAL MEDICINE

## 2022-05-18 PROCEDURE — 85027 COMPLETE CBC AUTOMATED: CPT | Performed by: INTERNAL MEDICINE

## 2022-05-18 PROCEDURE — 97110 THERAPEUTIC EXERCISES: CPT

## 2022-05-18 PROCEDURE — 80048 BASIC METABOLIC PNL TOTAL CA: CPT | Performed by: INTERNAL MEDICINE

## 2022-05-18 PROCEDURE — 97116 GAIT TRAINING THERAPY: CPT

## 2022-05-18 RX ADMIN — ACETAMINOPHEN 1000 MG: 500 TABLET ORAL at 06:42

## 2022-05-18 RX ADMIN — ASPIRIN 81 MG: 81 TABLET, COATED ORAL at 21:43

## 2022-05-18 RX ADMIN — METOPROLOL TARTRATE 25 MG: 25 TABLET, FILM COATED ORAL at 06:42

## 2022-05-18 RX ADMIN — METOPROLOL TARTRATE 37.5 MG: 25 TABLET, FILM COATED ORAL at 21:42

## 2022-05-18 RX ADMIN — METOPROLOL TARTRATE 12.5 MG: 25 TABLET, FILM COATED ORAL at 08:54

## 2022-05-18 RX ADMIN — ACETAMINOPHEN 1000 MG: 500 TABLET ORAL at 21:42

## 2022-05-18 RX ADMIN — OXYCODONE 10 MG: 5 TABLET ORAL at 00:39

## 2022-05-18 RX ADMIN — ASPIRIN 81 MG: 81 TABLET, COATED ORAL at 08:54

## 2022-05-18 RX ADMIN — ACETAMINOPHEN 1000 MG: 500 TABLET ORAL at 14:26

## 2022-05-19 LAB
ANION GAP SERPL CALCULATED.3IONS-SCNC: 11 MMOL/L (ref 5–15)
BASOPHILS # BLD AUTO: 0.02 10*3/MM3 (ref 0–0.2)
BASOPHILS NFR BLD AUTO: 0.2 % (ref 0–1.5)
BUN SERPL-MCNC: 28 MG/DL (ref 8–23)
BUN/CREAT SERPL: 43.8 (ref 7–25)
CALCIUM SPEC-SCNC: 8.4 MG/DL (ref 8.6–10.5)
CHLORIDE SERPL-SCNC: 104 MMOL/L (ref 98–107)
CO2 SERPL-SCNC: 26 MMOL/L (ref 22–29)
CREAT SERPL-MCNC: 0.64 MG/DL (ref 0.57–1)
DEPRECATED RDW RBC AUTO: 46.4 FL (ref 37–54)
EGFRCR SERPLBLD CKD-EPI 2021: 90.6 ML/MIN/1.73
EOSINOPHIL # BLD AUTO: 0.05 10*3/MM3 (ref 0–0.4)
EOSINOPHIL NFR BLD AUTO: 0.4 % (ref 0.3–6.2)
ERYTHROCYTE [DISTWIDTH] IN BLOOD BY AUTOMATED COUNT: 13.6 % (ref 12.3–15.4)
GLUCOSE SERPL-MCNC: 122 MG/DL (ref 65–99)
HCT VFR BLD AUTO: 25.1 % (ref 34–46.6)
HGB BLD-MCNC: 8.2 G/DL (ref 12–15.9)
IMM GRANULOCYTES # BLD AUTO: 0.06 10*3/MM3 (ref 0–0.05)
IMM GRANULOCYTES NFR BLD AUTO: 0.5 % (ref 0–0.5)
LYMPHOCYTES # BLD AUTO: 1.97 10*3/MM3 (ref 0.7–3.1)
LYMPHOCYTES NFR BLD AUTO: 16.2 % (ref 19.6–45.3)
MCH RBC QN AUTO: 30.8 PG (ref 26.6–33)
MCHC RBC AUTO-ENTMCNC: 32.7 G/DL (ref 31.5–35.7)
MCV RBC AUTO: 94.4 FL (ref 79–97)
MONOCYTES # BLD AUTO: 1.34 10*3/MM3 (ref 0.1–0.9)
MONOCYTES NFR BLD AUTO: 11 % (ref 5–12)
NEUTROPHILS NFR BLD AUTO: 71.7 % (ref 42.7–76)
NEUTROPHILS NFR BLD AUTO: 8.75 10*3/MM3 (ref 1.7–7)
NRBC BLD AUTO-RTO: 0 /100 WBC (ref 0–0.2)
PLATELET # BLD AUTO: 188 10*3/MM3 (ref 140–450)
PMV BLD AUTO: 10.7 FL (ref 6–12)
POTASSIUM SERPL-SCNC: 4.2 MMOL/L (ref 3.5–5.2)
RBC # BLD AUTO: 2.66 10*6/MM3 (ref 3.77–5.28)
SODIUM SERPL-SCNC: 141 MMOL/L (ref 136–145)
WBC NRBC COR # BLD: 12.19 10*3/MM3 (ref 3.4–10.8)

## 2022-05-19 PROCEDURE — 80048 BASIC METABOLIC PNL TOTAL CA: CPT | Performed by: INTERNAL MEDICINE

## 2022-05-19 PROCEDURE — 99232 SBSQ HOSP IP/OBS MODERATE 35: CPT | Performed by: INTERNAL MEDICINE

## 2022-05-19 PROCEDURE — 97116 GAIT TRAINING THERAPY: CPT

## 2022-05-19 PROCEDURE — 97110 THERAPEUTIC EXERCISES: CPT

## 2022-05-19 PROCEDURE — 97535 SELF CARE MNGMENT TRAINING: CPT | Performed by: OCCUPATIONAL THERAPIST

## 2022-05-19 PROCEDURE — 85025 COMPLETE CBC W/AUTO DIFF WBC: CPT | Performed by: ORTHOPAEDIC SURGERY

## 2022-05-19 PROCEDURE — 99024 POSTOP FOLLOW-UP VISIT: CPT | Performed by: ORTHOPAEDIC SURGERY

## 2022-05-19 RX ORDER — POLYETHYLENE GLYCOL 3350 17 G/17G
17 POWDER, FOR SOLUTION ORAL DAILY PRN
Status: DISCONTINUED | OUTPATIENT
Start: 2022-05-19 | End: 2022-05-21 | Stop reason: HOSPADM

## 2022-05-19 RX ORDER — BISACODYL 10 MG
10 SUPPOSITORY, RECTAL RECTAL DAILY PRN
Status: DISCONTINUED | OUTPATIENT
Start: 2022-05-19 | End: 2022-05-21 | Stop reason: HOSPADM

## 2022-05-19 RX ORDER — AMOXICILLIN 250 MG
2 CAPSULE ORAL 2 TIMES DAILY
Status: DISCONTINUED | OUTPATIENT
Start: 2022-05-19 | End: 2022-05-21 | Stop reason: HOSPADM

## 2022-05-19 RX ORDER — BISACODYL 5 MG/1
5 TABLET, DELAYED RELEASE ORAL DAILY PRN
Status: DISCONTINUED | OUTPATIENT
Start: 2022-05-19 | End: 2022-05-21 | Stop reason: HOSPADM

## 2022-05-19 RX ADMIN — ASPIRIN 81 MG: 81 TABLET, COATED ORAL at 21:45

## 2022-05-19 RX ADMIN — METOPROLOL TARTRATE 37.5 MG: 25 TABLET, FILM COATED ORAL at 06:15

## 2022-05-19 RX ADMIN — ASPIRIN 81 MG: 81 TABLET, COATED ORAL at 08:01

## 2022-05-19 RX ADMIN — METOPROLOL TARTRATE 37.5 MG: 25 TABLET, FILM COATED ORAL at 14:15

## 2022-05-19 RX ADMIN — ACETAMINOPHEN 1000 MG: 500 TABLET ORAL at 06:14

## 2022-05-19 RX ADMIN — ACETAMINOPHEN 1000 MG: 500 TABLET ORAL at 14:16

## 2022-05-19 RX ADMIN — SENNOSIDES AND DOCUSATE SODIUM 2 TABLET: 50; 8.6 TABLET ORAL at 21:45

## 2022-05-19 RX ADMIN — METOPROLOL TARTRATE 37.5 MG: 25 TABLET, FILM COATED ORAL at 21:45

## 2022-05-19 RX ADMIN — SENNOSIDES AND DOCUSATE SODIUM 2 TABLET: 50; 8.6 TABLET ORAL at 11:34

## 2022-05-19 RX ADMIN — Medication 3 ML: at 08:01

## 2022-05-19 RX ADMIN — ACETAMINOPHEN 1000 MG: 500 TABLET ORAL at 21:45

## 2022-05-20 LAB
DEPRECATED RDW RBC AUTO: 46.4 FL (ref 37–54)
ERYTHROCYTE [DISTWIDTH] IN BLOOD BY AUTOMATED COUNT: 13.4 % (ref 12.3–15.4)
HCT VFR BLD AUTO: 26.1 % (ref 34–46.6)
HGB BLD-MCNC: 8.7 G/DL (ref 12–15.9)
MCH RBC QN AUTO: 31.3 PG (ref 26.6–33)
MCHC RBC AUTO-ENTMCNC: 33.3 G/DL (ref 31.5–35.7)
MCV RBC AUTO: 93.9 FL (ref 79–97)
PLATELET # BLD AUTO: 264 10*3/MM3 (ref 140–450)
PMV BLD AUTO: 10.1 FL (ref 6–12)
RBC # BLD AUTO: 2.78 10*6/MM3 (ref 3.77–5.28)
WBC NRBC COR # BLD: 12.25 10*3/MM3 (ref 3.4–10.8)

## 2022-05-20 PROCEDURE — 85027 COMPLETE CBC AUTOMATED: CPT | Performed by: INTERNAL MEDICINE

## 2022-05-20 PROCEDURE — 99232 SBSQ HOSP IP/OBS MODERATE 35: CPT | Performed by: INTERNAL MEDICINE

## 2022-05-20 PROCEDURE — 97116 GAIT TRAINING THERAPY: CPT

## 2022-05-20 PROCEDURE — 99024 POSTOP FOLLOW-UP VISIT: CPT | Performed by: ORTHOPAEDIC SURGERY

## 2022-05-20 PROCEDURE — 97110 THERAPEUTIC EXERCISES: CPT

## 2022-05-20 RX ORDER — METOPROLOL TARTRATE 50 MG/1
50 TABLET, FILM COATED ORAL 2 TIMES DAILY
Status: DISCONTINUED | OUTPATIENT
Start: 2022-05-20 | End: 2022-05-21 | Stop reason: HOSPADM

## 2022-05-20 RX ADMIN — METOPROLOL TARTRATE 37.5 MG: 25 TABLET, FILM COATED ORAL at 06:17

## 2022-05-20 RX ADMIN — ACETAMINOPHEN 1000 MG: 500 TABLET ORAL at 14:20

## 2022-05-20 RX ADMIN — ASPIRIN 81 MG: 81 TABLET, COATED ORAL at 21:11

## 2022-05-20 RX ADMIN — ACETAMINOPHEN 1000 MG: 500 TABLET ORAL at 21:11

## 2022-05-20 RX ADMIN — Medication 3 ML: at 21:13

## 2022-05-20 RX ADMIN — METOPROLOL TARTRATE 50 MG: 50 TABLET, FILM COATED ORAL at 21:12

## 2022-05-20 RX ADMIN — POLYETHYLENE GLYCOL (3350) 17 G: 17 POWDER, FOR SOLUTION ORAL at 08:55

## 2022-05-20 RX ADMIN — SENNOSIDES AND DOCUSATE SODIUM 2 TABLET: 50; 8.6 TABLET ORAL at 08:55

## 2022-05-20 RX ADMIN — ASPIRIN 81 MG: 81 TABLET, COATED ORAL at 08:55

## 2022-05-20 RX ADMIN — BISACODYL 5 MG: 5 TABLET, COATED ORAL at 06:17

## 2022-05-20 RX ADMIN — ACETAMINOPHEN 1000 MG: 500 TABLET ORAL at 06:17

## 2022-05-21 VITALS
SYSTOLIC BLOOD PRESSURE: 113 MMHG | RESPIRATION RATE: 18 BRPM | BODY MASS INDEX: 33.32 KG/M2 | HEART RATE: 99 BPM | TEMPERATURE: 98.1 F | DIASTOLIC BLOOD PRESSURE: 91 MMHG | HEIGHT: 65 IN | WEIGHT: 200 LBS | OXYGEN SATURATION: 95 %

## 2022-05-21 PROBLEM — S72.22XA CLOSED DISPLACED SUBTROCHANTERIC FRACTURE OF LEFT FEMUR, INITIAL ENCOUNTER: Status: RESOLVED | Noted: 2022-05-15 | Resolved: 2022-05-21

## 2022-05-21 PROBLEM — S72.002A CLOSED LEFT HIP FRACTURE: Status: RESOLVED | Noted: 2022-05-15 | Resolved: 2022-05-21

## 2022-05-21 PROBLEM — R73.9 HYPERGLYCEMIA: Status: RESOLVED | Noted: 2022-05-15 | Resolved: 2022-05-21

## 2022-05-21 PROBLEM — S72.009A HIP FRACTURE (HCC): Status: RESOLVED | Noted: 2022-05-15 | Resolved: 2022-05-21

## 2022-05-21 PROCEDURE — 99239 HOSP IP/OBS DSCHRG MGMT >30: CPT | Performed by: NURSE PRACTITIONER

## 2022-05-21 PROCEDURE — 97116 GAIT TRAINING THERAPY: CPT

## 2022-05-21 PROCEDURE — 99024 POSTOP FOLLOW-UP VISIT: CPT | Performed by: ORTHOPAEDIC SURGERY

## 2022-05-21 RX ORDER — CHOLECALCIFEROL (VITAMIN D3) 125 MCG
5 CAPSULE ORAL NIGHTLY PRN
Start: 2022-05-21 | End: 2022-09-27

## 2022-05-21 RX ORDER — ASPIRIN 81 MG/1
81 TABLET ORAL EVERY 12 HOURS SCHEDULED
Start: 2022-05-21 | End: 2022-09-27

## 2022-05-21 RX ORDER — METOPROLOL TARTRATE 50 MG/1
50 TABLET, FILM COATED ORAL 2 TIMES DAILY
Start: 2022-05-21 | End: 2022-09-27

## 2022-05-21 RX ORDER — ACETAMINOPHEN 500 MG
1000 TABLET ORAL EVERY 8 HOURS
Start: 2022-05-21 | End: 2022-11-11

## 2022-05-21 RX ADMIN — METOPROLOL TARTRATE 50 MG: 50 TABLET, FILM COATED ORAL at 08:40

## 2022-05-21 RX ADMIN — ACETAMINOPHEN 1000 MG: 500 TABLET ORAL at 06:08

## 2022-05-21 RX ADMIN — ASPIRIN 81 MG: 81 TABLET, COATED ORAL at 08:41

## 2022-05-21 RX ADMIN — SENNOSIDES AND DOCUSATE SODIUM 2 TABLET: 50; 8.6 TABLET ORAL at 08:40

## 2022-05-23 ENCOUNTER — HOME HEALTH ADMISSION (OUTPATIENT)
Dept: HOME HEALTH SERVICES | Facility: HOME HEALTHCARE | Age: 77
End: 2022-05-23

## 2022-05-23 ENCOUNTER — TELEPHONE (OUTPATIENT)
Dept: ORTHOPEDIC SURGERY | Facility: CLINIC | Age: 77
End: 2022-05-23

## 2022-05-23 DIAGNOSIS — S72.22XS CLOSED DISPLACED SUBTROCHANTERIC FRACTURE OF LEFT FEMUR, SEQUELA: Primary | ICD-10-CM

## 2022-05-23 DIAGNOSIS — Z98.890 STATUS POST SURGERY: ICD-10-CM

## 2022-05-23 NOTE — TELEPHONE ENCOUNTER
Page,    This is the Dr. Diane Mauricio nailing patient we discussed about PT Home Health.    Thank you,    Dimitrios JOSE(R)

## 2022-05-23 NOTE — TELEPHONE ENCOUNTER
Called patient to let her know Home Health orders are put in and they will be in contact to schedule. She verbalized understanding.    Dimitrios Call RT(R)

## 2022-05-23 NOTE — TELEPHONE ENCOUNTER
Caller: KHADAR   Relationship to Patient: SELF     Phone Number: [phone]   Reason for Call: PATIENT IS ASKING FOR ORDERS FOR HOME HEALTH DUE TO THE PLACE SHE WAS GOING TO DO PT AT IS CLOSED DUE TO COVID

## 2022-05-23 NOTE — TELEPHONE ENCOUNTER
Home health PT placed...  Toe-touch weightbearing left lower extremity, hip range of motion as tolerated.

## 2022-05-24 LAB
QT INTERVAL: 340 MS
QTC INTERVAL: 445 MS

## 2022-05-26 ENCOUNTER — TELEPHONE (OUTPATIENT)
Dept: ORTHOPEDIC SURGERY | Facility: CLINIC | Age: 77
End: 2022-05-26

## 2022-05-26 DIAGNOSIS — S72.22XS CLOSED DISPLACED SUBTROCHANTERIC FRACTURE OF LEFT FEMUR, SEQUELA: Primary | ICD-10-CM

## 2022-05-26 DIAGNOSIS — Z98.890 STATUS POST SURGERY: ICD-10-CM

## 2022-05-26 NOTE — TELEPHONE ENCOUNTER
Called patient back. I let her know that she is able to remove her dressing at this point. She will do this tonight and call back in the morning if there are any further problems with incision.     Concerning Home health and PT, I explained that since she is able to ambulate and shower with assistance, she really does not need home health at this point. She is agreeable to going to OPPT at [facility]. Page will put in order and Ashely will facilitate scheduling.       Stacia

## 2022-05-26 NOTE — TELEPHONE ENCOUNTER
PATIENT CALLED LATOYA CASPER FROM SURGERY. SHE SAID ITS THE ORIGINAL BANDAGE FROM SURGERY AND DOESN'T KNOW IF SHE SHOULD TAKE IT OFF OR CHANGE IT. PATIENT CAN BE REACHED [phone]. SHE WOULD LIKE A CALL BACK TODAY.

## 2022-05-26 NOTE — TELEPHONE ENCOUNTER
Caller: Bushra Vaz    Relationship to patient: Self    Best call back number:   Patient is needing: PATIENT STATES SHE HAS BEEN TALKING TO JUSTIN ALL DAY TODAY AND REALLY NEEDS TO TALK TO HER AGAIN BEFORE SHE LEAVES FOR THE DAY.

## 2022-05-27 ENCOUNTER — TELEPHONE (OUTPATIENT)
Dept: ORTHOPEDIC SURGERY | Facility: CLINIC | Age: 77
End: 2022-05-27

## 2022-05-27 NOTE — TELEPHONE ENCOUNTER
Hub staff attempted to follow warm transfer process and was unsuccessful     Caller: KHADAR    Relationship to patient: SELF    Best call back number: [phone]    Patient is needing: PT WANTED TO INFORM JUSTIN THAT SHE HAS APPT WITH PHYSICAL THERAPY [facility] AT 9:30AM

## 2022-05-31 ENCOUNTER — TREATMENT (OUTPATIENT)
Dept: PHYSICAL THERAPY | Facility: CLINIC | Age: 77
End: 2022-05-31

## 2022-05-31 DIAGNOSIS — R29.898 WEAKNESS OF LEFT LOWER EXTREMITY: ICD-10-CM

## 2022-05-31 DIAGNOSIS — R26.9 GAIT DISTURBANCE: Primary | ICD-10-CM

## 2022-05-31 PROCEDURE — 97162 PT EVAL MOD COMPLEX 30 MIN: CPT | Performed by: PHYSICAL THERAPIST

## 2022-05-31 PROCEDURE — 97110 THERAPEUTIC EXERCISES: CPT | Performed by: PHYSICAL THERAPIST

## 2022-06-07 ENCOUNTER — OFFICE VISIT (OUTPATIENT)
Dept: ORTHOPEDIC SURGERY | Facility: CLINIC | Age: 77
End: 2022-06-07

## 2022-06-07 VITALS — TEMPERATURE: 97.1 F

## 2022-06-07 DIAGNOSIS — Z09 SURGERY FOLLOW-UP: Primary | ICD-10-CM

## 2022-06-07 PROCEDURE — 99024 POSTOP FOLLOW-UP VISIT: CPT | Performed by: PHYSICIAN ASSISTANT

## 2022-06-07 RX ORDER — DOXYCYCLINE HYCLATE 100 MG/1
100 CAPSULE ORAL 2 TIMES DAILY
Qty: 14 CAPSULE | Refills: 0 | Status: SHIPPED | OUTPATIENT
Start: 2022-06-07 | End: 2022-09-27

## 2022-06-07 NOTE — PROGRESS NOTES
[facility] [place] [facility] [place]      Subjective     CC: Post-op (3 week S/P HIP TROCHANTERIC NAILING LEFT 5/16/22)      KEL Vaz is a 76 y.o. female.  Patient presents a 3-week status post left IM nailing for femur fracture.  She reports hip pain has much improved.  She is doing outpatient physical therapy.  Her concern is redness and stinging in her most proximal incision.  She is just taking Tylenol for pain.    Overall, patient's symptoms are improving    ROS:    Constiutional:Pt denies fever, chills, nausea, or vomiting.  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    Patient is well nourished and well developed.        Ortho Exam  Left lower extremity exam: Incisions are healing well.  There is erythema around the most proximal incision with mild warmth.  No drainage.  No pain with hip motion.  Neurovascular intact distally.      Assessment    Assessment:  1. Surgery follow-up        Plan:  1. Recommend over the counter anti-inflammatories for pain and/or swelling  Doing well status post IM nailing for left femur fracture.  X-rays today show Stable exam with early signs of healing of the proximal femur s/p ORIF using an intramedullary hip screw device.  No changes when compared to the prior study.  Sutures removed Steri-Strips applied over the incision.  I had Dr. Christiansen look at the incisions as well.  Recommendation today is that  she continue to keep an eye on it make sure that it stays well approximated without any increased erythema drainage, fever chills constitutional symptoms.  We have given her prescription for doxycycline for the next week.  I will see her back in 1 week for exam or sooner if needed.    History, diagnosis and treatment plan discussed with Dr. Christiansen.      Raquel Keyes PA-C  06/08/22  12:47 EDT      Dragon disclaimer:  Much of this encounter note is an electronic transcription/translation of spoken language to printed text. The electronic translation of spoken language may permit erroneous, or at times, nonsensical words or phrases to be inadvertently transcribed; Although I have reviewed the note for such errors, some may still exist.

## 2022-06-09 ENCOUNTER — TREATMENT (OUTPATIENT)
Dept: PHYSICAL THERAPY | Facility: CLINIC | Age: 77
End: 2022-06-09

## 2022-06-09 DIAGNOSIS — R29.898 WEAKNESS OF LEFT LOWER EXTREMITY: Primary | ICD-10-CM

## 2022-06-09 DIAGNOSIS — R26.9 GAIT DISTURBANCE: ICD-10-CM

## 2022-06-09 PROCEDURE — 97116 GAIT TRAINING THERAPY: CPT | Performed by: PHYSICAL THERAPIST

## 2022-06-09 PROCEDURE — 97110 THERAPEUTIC EXERCISES: CPT | Performed by: PHYSICAL THERAPIST

## 2022-06-09 NOTE — PROGRESS NOTES
Physical Therapy Daily Progress Note  VISIT: 2          Subjective    Bushra C Татьяна reports: no pain, sutures removed yesterday.      Pre-treatment pain:  0  Post-treatment pain:  0      Objective    Treatment    Exercise 1  Exercise Name 1: LAQ  Exercise 2  Exercise Name 2: heel slides  Exercise 3  Exercise Name 3: hooklying ER/IR  Exercise 4  Exercise Name 4: bridging  Exercise 5  Exercise Name 5: hip abduction supine  Exercise 6  Exercise Name 6: sit<>stand  Exercise 7  Exercise Name 7: gait with wheeled walker, TTWB LLE  Exercise 8  Exercise Name 8: practice at bottom of stairs ( present and educated in sequence and how to assist), up and down 2 steps with walker, railing, and CG assist               Assessment & Plan     Assessment    Assessment details: Pt denies pain.  MD follow up indicated stable hardware and early healing.    Plan  Plan details: Continue weekly PT until WB status is increased.               Timed:  Manual Therapy:         mins  66380;  Therapeutic Exercise:    20     mins  45958;     Neuromuscular Romero:        mins  47990;    Therapeutic Activity:          mins  26419;     Gait Training:      15     mins  46970;     Ultrasound:          mins  06870;    Electrical Stimulation:         mins  21165 ( );    Untimed:  Electrical Stimulation:         mins  36714 ( );  Mechanical Traction:         mins  66113;  Canalith Repositioning       mins  82855    Timed Treatment:   35   mins   Total Treatment:     35   mins      Marya Paez, PT  Physical Therapist

## 2022-06-14 ENCOUNTER — OFFICE VISIT (OUTPATIENT)
Dept: ORTHOPEDIC SURGERY | Facility: CLINIC | Age: 77
End: 2022-06-14

## 2022-06-14 DIAGNOSIS — Z09 SURGERY FOLLOW-UP: Primary | ICD-10-CM

## 2022-06-14 PROCEDURE — 99024 POSTOP FOLLOW-UP VISIT: CPT | Performed by: PHYSICIAN ASSISTANT

## 2022-06-14 NOTE — PROGRESS NOTES
[facility] [place] [facility] [place]        Subjective     CC: Follow-up (1 month s/p  HIP TROCHANTERIC NAILING LEFT 5/16/22, 1 week incision check)      KEL Vaz is a 76 y.o. female.  Patient returns today for incision check.  Her most proximal incision was mildly [place] erythematous and inflamed at last visit.  She has been taking the doxycycline as prescribed.  She is not having any pain in the hip.  No fever chills constitutional symptoms.  No drainage from the incision site.    Overall, patient's symptoms are proved    ROS:    Constiutional:Pt denies fever, chills, nausea, or vomiting.  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    There is no height or weight on file to calculate BMI.    Patient is well nourished and well developed.        Ortho Exam  Left lower extremity exam: Incisions are healing well.  Proximal incision looks much better, well approximated.  No drainage.  No pain with hip motion.  Neurovascular intact distally.    Imaging/Labs/EMG Reviewed:  Imaging Results (Last 24 Hours)     ** No results found for the last 24 hours. **            Assessment    Assessment:  1. Surgery follow-up        Plan:  1. Recommend over the counter anti-inflammatories for pain and/or swelling  2. Doing well status post IM nailing for left femur fracture on 5/16/2022.  Her incisions are doing well.  The proximal incision looks much better.  Plan today is she continue with her  physical therapy.  She must continue maintaining toe-touch weightbearing with a walker.  She will return in 2 weeks to see Dr. Contreras or sooner if needed.    History, diagnosis and treatment plan discussed with Dr. Contreras.          Raquel Keyes PA-C  06/16/22  11:09 EDT

## 2022-06-17 ENCOUNTER — TREATMENT (OUTPATIENT)
Dept: PHYSICAL THERAPY | Facility: CLINIC | Age: 77
End: 2022-06-17

## 2022-06-17 DIAGNOSIS — R29.898 WEAKNESS OF LEFT LOWER EXTREMITY: Primary | ICD-10-CM

## 2022-06-17 DIAGNOSIS — R26.9 GAIT DISTURBANCE: ICD-10-CM

## 2022-06-17 PROCEDURE — 97110 THERAPEUTIC EXERCISES: CPT | Performed by: PHYSICAL THERAPIST

## 2022-06-17 PROCEDURE — 97116 GAIT TRAINING THERAPY: CPT | Performed by: PHYSICAL THERAPIST

## 2022-06-17 NOTE — PROGRESS NOTES
Physical Therapy Daily Progress Note  VISIT: 3          Subjective    Bushra Vaz reports: no hip pain.  She is taking less Tylenol now.  She remains TTWB LLE until next MD follow up.  She has learned how to compensate for limited WB status in her daily activities at home.      Pre-treatment pain:  0  Post-treatment pain:  0      Objective    Treatment    Exercise 1  Exercise Name 1: Bicycle  Exercise 2  Exercise Name 2: standing L hip flexion/march  Exercise 3  Exercise Name 3: standing L hip abduction  Exercise 4  Exercise Name 4: standing L hip extension  Exercise 5  Exercise Name 5: supine quad sets  Exercise 6  Exercise Name 6: long axis hip ER/IR  Exercise 7  Exercise Name 7: hooklying hip ER/IR  Exercise 8  Exercise Name 8: bridging with limited pressure through LLE  Exercise 9  Exercise Name 9: gait with wheeled walker-TTWB LLE  Exercise 10  Exercise Name 10: supine assisted L hip abduction  Exercise 11  Exercise Name 11: supine assisted hip flexor stretch off edge of mat with PT supporting LLE                 Assessment & Plan     Assessment    Assessment details: Pt demonstrates improving overall mobility skills as she adjusts to limited WB status. She indicates very minimal to no discomfort.    Plan  Plan details: Continue PT for L LE mobility and strengthening within limited WB status.  Progress when increase in WB is allowed.               Timed:  Manual Therapy:         mins  23391;  Therapeutic Exercise:    30     mins  38592;     Neuromuscular Romero:        mins  02376;    Therapeutic Activity:          mins  87574;     Gait Trainin     mins  45383;     Ultrasound:          mins  10315;    Electrical Stimulation:         mins  63977 ( );    Untimed:  Electrical Stimulation:         mins  25069 ( );  Mechanical Traction:         mins  15407;  Canalith Repositioning       mins  93624    Timed Treatment:   38   mins   Total Treatment:     38   mins      Marya Paez  PT  Physical Therapist

## 2022-06-22 ENCOUNTER — TREATMENT (OUTPATIENT)
Dept: PHYSICAL THERAPY | Facility: CLINIC | Age: 77
End: 2022-06-22

## 2022-06-22 DIAGNOSIS — R29.898 WEAKNESS OF LEFT LOWER EXTREMITY: Primary | ICD-10-CM

## 2022-06-22 PROCEDURE — 97110 THERAPEUTIC EXERCISES: CPT | Performed by: PHYSICAL THERAPIST

## 2022-06-22 NOTE — PROGRESS NOTES
Physical Therapy Daily Progress Note  VISIT: 4          Subjective    Bushra Vaz reports: no pain.  She baked a cake this morning.  She is looking forward to being able to put more weight on her leg.      Pre-treatment pain:  0  Post-treatment pain:  0      Objective    Treatment    Exercise 1  Exercise Name 1: Bicycle  Time: 5'  Exercise 2  Exercise Name 2: hooklying L hip flexion/march  Exercise 3  Exercise Name 3: supine assisted L hip abduction with adductor stretch  Exercise 4  Exercise Name 4: assisted L knee to chest stretch  Exercise 5  Exercise Name 5: supine hip flexor stretch with knee flex/ext  Exercise 6  Exercise Name 6: assisted L hip ER/IR ROM at 90 degrees flexion  Exercise 7  Exercise Name 7: L clamshell  Exercise 8  Exercise Name 8: bridging with limited pressure through LLE  Exercise 9  Exercise Name 9: assisted sidelying hip abduction with straight leg  Exercise 10  Exercise Name 10: passive L hamstring stretch                 Assessment & Plan     Assessment    Assessment details: Pt is doing very well within WB restrictions.    Plan  Plan details: Continue PT.  Pt will see MD next week.  Progress as indicated following MD follow up.               Timed:  Manual Therapy:         mins  50872;  Therapeutic Exercise:    30     mins  74986;     Neuromuscular Romero:        mins  01444;    Therapeutic Activity:          mins  35304;     Gait Training:           mins  20679;     Ultrasound:          mins  30156;    Electrical Stimulation:         mins  02479 ( );    Untimed:  Electrical Stimulation:         mins  76515 ( );  Mechanical Traction:         mins  47720;  Canalith Repositioning       mins  00380    Timed Treatment:   30   mins   Total Treatment:     30   mins      Marya Paez, PT  Physical Therapist

## 2022-06-28 ENCOUNTER — OFFICE VISIT (OUTPATIENT)
Dept: ORTHOPEDIC SURGERY | Facility: CLINIC | Age: 77
End: 2022-06-28

## 2022-06-28 DIAGNOSIS — Z09 SURGERY FOLLOW-UP: ICD-10-CM

## 2022-06-28 DIAGNOSIS — S72.22XD CLOSED DISPLACED SUBTROCHANTERIC FRACTURE OF LEFT FEMUR WITH ROUTINE HEALING, SUBSEQUENT ENCOUNTER: Primary | ICD-10-CM

## 2022-06-28 PROCEDURE — 99024 POSTOP FOLLOW-UP VISIT: CPT | Performed by: ORTHOPAEDIC SURGERY

## 2022-06-28 NOTE — PROGRESS NOTES
Orthopaedic Clinic Note:  Hip Post Op    Chief Complaint   Patient presents with   • Post-op Follow-up     2 week follow up - 6 weeks s/p  HIP TROCHANTERIC NAILING LEFT 5/16/22        HPI    Ms. Vaz is 6  week(s) s/p Left hip subtrochanteric fracture fixation.  At her last visit, she was instructed to remain toe-touch weightbearing.  Unfortunately, she has been putting full weight through the extremity with a walker.  She rates her pain a 1/10 on the pain scale.  She is having some minor aching pain in the knee.  Overall she is doing better.  She is continuing outpatient physical therapy at [facility].    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    • Osteoarthritis    • Osteopenia 11/2020    FRAX risk 15% / 4.6%   • Vitamin B12 deficiency    • Vitamin D deficiency       Past Surgical History:   Procedure Laterality Date   • APPENDECTOMY  2010    during hysterectomy   • BREAST EXCISIONAL BIOPSY Right    • CATARACT EXTRACTION, BILATERAL  2002   • COLONOSCOPY  04/2018   • COLONOSCOPY W/ POLYPECTOMY  2013   • HIP TROCHANTERIC NAILING WITH INTRAMEDULLARY HIP SCREW Left 5/16/2022    Procedure: HIP TROCHANTERIC NAILING LEFT;  Surgeon: Satya Contreras MD;  Location: [facility];  Service: Orthopedics;  Laterality: Left;   • REPLACEMENT TOTAL KNEE BILATERAL Bilateral 2012   • TOTAL ABDOMINAL HYSTERECTOMY WITH SALPINGO OOPHORECTOMY Bilateral 2010    Stage IIIa adeno squamous carcinoma      Family History   Problem Relation Age of Onset   • Lymphoma Mother         non-hodgkins   • Heart attack Father 70   • Colon cancer Sister 55   • Diabetes Sister         diet controlled   • Stomach cancer Brother 78        non-smoker   • Breast cancer  Other    • Stroke Neg Hx    • Thyroid disease Neg Hx    • Ovarian cancer Neg Hx      Social History     Socioeconomic History   • Marital status:    Tobacco Use   • Smoking status: Former Smoker     Packs/day: 0.20     Types: Cigarettes     Quit date:      Years since quittin.5   • Smokeless tobacco: Never Used   • Tobacco comment: smoked socially as young adult   Substance and Sexual Activity   • Alcohol use: Yes     Comment: 2-3 drinks weekly   • Drug use: No   • Sexual activity: Not Currently      Current Outpatient Medications on File Prior to Visit   Medication Sig Dispense Refill   • acetaminophen (TYLENOL) 500 MG tablet Take 2 tablets by mouth Every 8 (Eight) Hours.     • aspirin 81 MG EC tablet Take 1 tablet by mouth Every 12 (Twelve) Hours.     • doxycycline (VIBRAMYCIN) 100 MG capsule Take 1 capsule by mouth 2 (Two) Times a Day. 14 capsule 0   • melatonin 5 MG tablet tablet Take 1 tablet by mouth At Night As Needed (sleep).     • metoprolol tartrate (LOPRESSOR) 50 MG tablet Take 1 tablet by mouth 2 (Two) Times a Day.       No current facility-administered medications on file prior to visit.      Allergies   Allergen Reactions   • Atorvastatin      Joint pains   • Pravastatin Sodium      Ankle pain        Review of Systems     Physical Exam  not currently breastfeeding.    There is no height or weight on file to calculate BMI.    GENERAL APPEARANCE: awake, alert, oriented, in no acute distress and well developed, well nourished  LUNGS:  breathing nonlabored  EXTREMITIES: no clubbing, cyanosis  PERIPHERAL PULSES: palpable dorsalis pedis and posterior tibial pulses bilaterally.    GAIT:  Trendelenberg            Hip Exam:  Left    RANGE OF MOTION:  EXTENSION/FLEXION:  normal (0-110 degrees)  IR:  20  ER:  35  PAIN WITH HIP MOTION:  no  PAIN WITH LOGROLL:  no     STRENGTH:  ABDUCTOR:  4/5  ADDUCTOR:  4/5  HIP FLEXION:  4/5    GREATER TROCHANTER BURSAL PAIN:  yes    SENSATION TO LIGHT  TOUCH:  DEEP PERONEAL/SUPERFICIAL PERONEAL/SURAL/SAPHENOUS/TIBIAL:   intact    EDEMA:  no  ERYTHEMA:  no  WOUNDS/INCISIONS:   yes, well healed surgical incision without evidence of erythema or drainage  _______________________________________________________________  _______________________________________________________________    RADIOGRAPHIC FINDINGS:   Indication: Status post fixation left subtrochanteric hip fracture    Comparison: Todays xrays were compared to previous xrays from 6/7/2022    Left femur 2 views: Radiographs demonstrate interval consolidation of the subtrochanteric femur fracture.  There has been some controlled collapse through the lag screw with no change in fracture alignment.  Callus formation is identified.    Assessment/Plan:   Diagnosis Plan   1. Closed displaced subtrochanteric fracture of left femur with routine healing, subsequent encounter     2. Surgery follow-up  XR Femur 2 View Left     At this point we will initiate partial weightbearing (which the patient has already been doing despite toe-touch recommendation).  She is to maintain partial weightbearing status for the next 2 months.  I will see her back in 2 months for repeat assessment x-ray 2 views left femur.  Provided adequate healing is seen at that time we will initiate progressive weightbearing and weaning off the walker.    Satya Contreras MD  06/28/22  10:51 EDT

## 2022-06-29 ENCOUNTER — TREATMENT (OUTPATIENT)
Dept: PHYSICAL THERAPY | Facility: CLINIC | Age: 77
End: 2022-06-29

## 2022-06-29 DIAGNOSIS — R29.898 WEAKNESS OF LEFT LOWER EXTREMITY: Primary | ICD-10-CM

## 2022-06-29 DIAGNOSIS — R26.9 GAIT DISTURBANCE: ICD-10-CM

## 2022-06-29 PROCEDURE — 97110 THERAPEUTIC EXERCISES: CPT | Performed by: PHYSICAL THERAPIST

## 2022-06-29 NOTE — PROGRESS NOTES
Physical Therapy Daily Progress Note  VISIT: 5          Subjective    Bushra Vaz reports: she saw MD yesterday.  She is disappointed that she is to remain limited WB (PWB) for 2 more months.  She has apparently been placing more weight than intended on LLE at home.      Pre-treatment pain:  0  Post-treatment pain:  0      Objective    Treatment    Exercise 1  Exercise Name 1: HEP review  Exercise 2  Exercise Name 2: Addressed pt and 's questions regarding ongoing limited WB status.  Exercise 3  Exercise Name 3: Advised that pt use bath scales to determine amount of weight to place on LLE to maintain PWB (approximately 50% body weight)                 Assessment & Plan     Assessment    Assessment details: Pt is independent in existing HEP and will continue with this at home until WB status is increased.  Pt and  indicate understanding of current plan.    Plan  Plan details: Pt will continue HEP and will adhere to PWB status for 2 more months.  She will resume formal PT after she is allowed to progress weight bearing in 2 more months.                Timed:  Manual Therapy:         mins  58808;  Therapeutic Exercise:         mins  68433;     Neuromuscular Romero:        mins  68774;    Therapeutic Activity:     25     mins  47389;     Gait Training:           mins  23969;     Ultrasound:          mins  34841;    Electrical Stimulation:         mins  87863 ( );    Untimed:  Electrical Stimulation:         mins  54228 ( );  Mechanical Traction:         mins  22257;  Canalith Repositioning       mins  56509    Timed Treatment:   25   mins   Total Treatment:     25   mins      Marya Paez, PT  Physical Therapist

## 2022-06-29 NOTE — PROGRESS NOTES
Physical Therapy Daily Progress Note  VISIT: 5          Subjective    Bushra Vaz reports: ***      Pre-treatment pain:  {CKA numbers 0-10:88975}  Post-treatment pain:  {CKA numbers 0-10:83538}      Objective    Treatment                      Assessment/Plan           Timed:  Manual Therapy:    ***     mins  07917;  Therapeutic Exercise:    ***     mins  96208;     Neuromuscular Romero:    ***    mins  44078;    Therapeutic Activity:     ***     mins  72001;     Gait Training:      ***     mins  95434;     Ultrasound:     ***     mins  64635;    Electrical Stimulation:    ***     mins  82089 ( );    Untimed:  Electrical Stimulation:    ***     mins  88489 ( );  Mechanical Traction:    ***     mins  97842;  Canalith Repositioning   ***    mins  65197    Timed Treatment:   ***   mins   Total Treatment:     ***   mins      Marya Paez, PT  Physical Therapist

## 2022-08-30 ENCOUNTER — OFFICE VISIT (OUTPATIENT)
Dept: ORTHOPEDIC SURGERY | Facility: CLINIC | Age: 77
End: 2022-08-30

## 2022-08-30 VITALS
SYSTOLIC BLOOD PRESSURE: 126 MMHG | DIASTOLIC BLOOD PRESSURE: 70 MMHG | BODY MASS INDEX: 32.15 KG/M2 | HEIGHT: 65 IN | WEIGHT: 193 LBS

## 2022-08-30 DIAGNOSIS — S72.22XD CLOSED DISPLACED SUBTROCHANTERIC FRACTURE OF LEFT FEMUR WITH ROUTINE HEALING, SUBSEQUENT ENCOUNTER: Primary | ICD-10-CM

## 2022-08-30 DIAGNOSIS — Z09 SURGERY FOLLOW-UP: ICD-10-CM

## 2022-08-30 PROCEDURE — 99212 OFFICE O/P EST SF 10 MIN: CPT | Performed by: ORTHOPAEDIC SURGERY

## 2022-08-30 NOTE — PROGRESS NOTES
Orthopaedic Clinic Note: Hip Established Patient    Chief Complaint   Patient presents with   • Follow-up     2 month f/u -  3.5 months s/p  HIP TROCHANTERIC NAILING LEFT 5/16/22            HPI    It has been 2  month(s) since Ms. Vaz's last visit. She returns to clinic today for follow-up central fracture of the left femur.  She comes to clinic today for follow-up assessment.  She continues to use a walker to assist with ambulation.  Rates her pain 0/10 on the pain scale.  Denies fevers chills or constitutional symptoms.  Overall she is happy with her progress.    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    • Osteoarthritis    • Osteopenia 11/2020    FRAX risk 15% / 4.6%   • Vitamin B12 deficiency    • Vitamin D deficiency       Past Surgical History:   Procedure Laterality Date   • APPENDECTOMY  2010    during hysterectomy   • BREAST EXCISIONAL BIOPSY Right    • CATARACT EXTRACTION, BILATERAL  2002   • COLONOSCOPY  04/2018   • COLONOSCOPY W/ POLYPECTOMY  2013   • HIP TROCHANTERIC NAILING WITH INTRAMEDULLARY HIP SCREW Left 5/16/2022    Procedure: HIP TROCHANTERIC NAILING LEFT;  Surgeon: Satya Contreras MD;  Location: [facility];  Service: Orthopedics;  Laterality: Left;   • REPLACEMENT TOTAL KNEE BILATERAL Bilateral 2012   • TOTAL ABDOMINAL HYSTERECTOMY WITH SALPINGO OOPHORECTOMY Bilateral 2010    Stage IIIa adeno squamous carcinoma      Family History   Problem Relation Age of Onset   • Lymphoma Mother         non-hodgkins   • Heart attack Father 70   • Colon cancer Sister 55   • Diabetes Sister         diet controlled   • Stomach cancer Brother 78        non-smoker   • Breast cancer Other    • Stroke Neg Hx    • Thyroid disease  "Neg Hx    • Ovarian cancer Neg Hx      Social History     Socioeconomic History   • Marital status:    Tobacco Use   • Smoking status: Former Smoker     Packs/day: 0.20     Types: Cigarettes     Quit date:      Years since quittin.6   • Smokeless tobacco: Never Used   • Tobacco comment: smoked socially as young adult   Substance and Sexual Activity   • Alcohol use: Yes     Comment: 2-3 drinks weekly   • Drug use: No   • Sexual activity: Not Currently      Current Outpatient Medications on File Prior to Visit   Medication Sig Dispense Refill   • acetaminophen (TYLENOL) 500 MG tablet Take 2 tablets by mouth Every 8 (Eight) Hours.     • aspirin 81 MG EC tablet Take 1 tablet by mouth Every 12 (Twelve) Hours.     • doxycycline (VIBRAMYCIN) 100 MG capsule Take 1 capsule by mouth 2 (Two) Times a Day. 14 capsule 0   • melatonin 5 MG tablet tablet Take 1 tablet by mouth At Night As Needed (sleep).     • metoprolol tartrate (LOPRESSOR) 50 MG tablet Take 1 tablet by mouth 2 (Two) Times a Day.       No current facility-administered medications on file prior to visit.      Allergies   Allergen Reactions   • Atorvastatin      Joint pains   • Pravastatin Sodium      Ankle pain        Review of Systems   Constitutional: Negative.    HENT: Negative.    Eyes: Negative.    Respiratory: Negative.    Cardiovascular: Negative.    Gastrointestinal: Negative.    Endocrine: Negative.    Genitourinary: Negative.    Musculoskeletal: Positive for arthralgias.   Skin: Negative.    Allergic/Immunologic: Negative.    Neurological: Negative.    Hematological: Negative.    Psychiatric/Behavioral: Negative.         The patient's Review of Systems was personally reviewed and confirmed as accurate.    Physical Exam  Blood pressure 126/70, height 165.1 cm (65\"), weight 87.5 kg (193 lb), not currently breastfeeding.    Body mass index is 32.12 kg/m².    GENERAL APPEARANCE: awake, alert, oriented, in no acute distress and well developed, " well nourished  LUNGS:  breathing nonlabored  EXTREMITIES: no clubbing, cyanosis  PERIPHERAL PULSES: palpable dorsalis pedis and posterior tibial pulses bilaterally.    GAIT:  Trendelenberg            Hip Exam:  Left     RANGE OF MOTION:  EXTENSION/FLEXION:  normal (0-110 degrees)  IR:  20  ER:  35  PAIN WITH HIP MOTION:  no  PAIN WITH LOGROLL:  no      STRENGTH:  ABDUCTOR:    4/5  ADDUCTOR:  4/5  HIP FLEXION:  4/5     GREATER TROCHANTER BURSAL PAIN:   Trace    SENSATION TO LIGHT TOUCH:  DEEP PERONEAL/SUPERFICIAL PERONEAL/SURAL/SAPHENOUS/TIBIAL:   intact    EDEMA:  no  ERYTHEMA:  no  WOUNDS/INCISIONS:   yes, well healed surgical incision without evidence of erythema or drainage  _________________________________________________________________  _________________________________________________________________    RADIOGRAPHIC FINDINGS:   Indication: Status post fixation left subtrochanteric hip fracture    Comparison: Todays xrays were compared to previous xrays from 6/28/2022    2 views left femur: Radiographs demonstrate continued interval consolidation of the subtrochanteric hip fracture with no change in alignment compared to prior radiographs.  Robust callus formation identified a fracture line remains visible.    Assessment/Plan:   Diagnosis Plan   1. Closed displaced subtrochanteric fracture of left femur with routine healing, subsequent encounter     2. Surgery follow-up  XR Femur 2 View Left     At this point we will discontinue the walker and progressed to a cane.  I discussed with her that if any point she experiences increase in pain, she is to revert back to the walker and call for follow-up appointment.  Otherwise I will see her back in 3 months for repeat assessment x-ray 2 views left femur on return.    Satya Contreras MD  08/30/22  10:31 EDT

## 2022-09-06 ENCOUNTER — TREATMENT (OUTPATIENT)
Dept: PHYSICAL THERAPY | Facility: CLINIC | Age: 77
End: 2022-09-06

## 2022-09-06 DIAGNOSIS — R26.9 GAIT DISTURBANCE: ICD-10-CM

## 2022-09-06 DIAGNOSIS — R29.898 WEAKNESS OF LEFT LOWER EXTREMITY: Primary | ICD-10-CM

## 2022-09-06 PROCEDURE — 97162 PT EVAL MOD COMPLEX 30 MIN: CPT | Performed by: PHYSICAL THERAPIST

## 2022-09-06 PROCEDURE — 97110 THERAPEUTIC EXERCISES: CPT | Performed by: PHYSICAL THERAPIST

## 2022-09-06 NOTE — PROGRESS NOTES
Physical Therapy Initial Evaluation and Plan of Care      Patient: Bushra Vaz   : 1945  Diagnosis/ICD-10 Code:  The primary encounter diagnosis was Weakness of left lower extremity. A diagnosis of Gait disturbance was also pertinent to this visit.   Referring practitioner: Page Correa, *  Date of Initial Visit: Type: THERAPY  Noted: 2022  Today's Date: 2022  Patient seen for 6 sessions         Visit Diagnoses:    ICD-10-CM ICD-9-CM   1. Weakness of left lower extremity  R29.898 729.89   2. Gait disturbance  R26.9 781.2       Subjective Questionnaire: LEFS:     Subjective Evaluation    History of Present Illness  Date of surgery: 2022  Mechanism of injury: Pt returns today to resume PT after prolonged period of NWB following L hip ORIF.  She was seen briefly after surgery, but was determined to be putting too much weight on her leg.  She was unable to maintain TTWB-PWB status and was required to remain NWB until last week.      Subjective comment: left leg weaknessPain  No pain reported    Social Support  Lives in: one-story house  Lives with: spouse    Patient Goals  Patient goals for therapy: independence with ADLs/IADLs and return to sport/leisure activities           Treatment  Exercise 1  Exercise Name 1: HEP progression practiced in clinic today             Objective          Active Range of Motion     Additional Active Range of Motion Details  Lacks left hip extension beyond neutral.      Strength/Myotome Testing     Left Hip   Planes of Motion   Flexion: 4-  Extension: 3-  Abduction: 3-    Right Hip   Planes of Motion   Flexion: 4  Extension: 3+  Abduction: 3+    Additional Strength Details  Knee and ankle strength are WNL's bilaterally.     General Comments     Hip Comments   Pt ambulates with cane in right hand.  Gait pattern is consistent with L hip weakness related to NWB status.         Assessment & Plan     Assessment  Impairments: abnormal or restricted ROM,  activity intolerance, impaired balance, impaired physical strength and lacks appropriate home exercise program  Functional Limitations: lifting, walking, pulling, pushing and standing  Assessment details: Pt is doing well with resuming LLE weight-bearing.  She will benefit from PT intervention to restore functional LLE strength, particularly in closed-chain activities.  Prognosis: good    Goals  Plan Goals: Plan Goals: 4 weeks  1)Pt. demonstrates independence and compliance with revised HEP, including weight-bearing activities.  2)Pt. demonstrates improving tolerance/confidence for full WB gait with cane.  3)L hip extension is improved to promote upright posture during gait/standing activities.  4)Functional outcome measure is improved by 8 points or more, indicating increasing functional abilities.    8 weeks  1)Pt. demonstrates independence in advanced HEP for ongoing improvement.  2)Functional strength and AROM of L hip is sufficient for performance of daily activities.  3)Functional outcome measure is improved to >/= 40/80 points, indicating improving functional abilities.        Plan  Therapy options: will be seen for skilled therapy services  Planned modality interventions: thermotherapy (hydrocollator packs) and cryotherapy  Planned therapy interventions: abdominal trunk stabilization, balance/weight-bearing training, body mechanics training, flexibility, functional ROM exercises, gait training, home exercise program, joint mobilization, manual therapy, neuromuscular re-education, postural training, strengthening, stretching and therapeutic activities  Frequency: 2x week  Duration in weeks: 8  Treatment plan discussed with: patient and caregiver  Plan details: Resume PT up to 2x/week, addressing deficits in LLE strength and functional abilities after period of NWB status.        Timed:  Manual Therapy:         mins  08124;  Therapeutic Exercise:    10     mins  29228;     Neuromuscular Romero:        mins   94812;    Therapeutic Activity:          mins  74928;     Gait Training:           mins  38802;     Ultrasound:          mins  12028;    Electrical Stimulation:         mins  30767 ( );  Iontophoresis  ___ mins   25218    Untimed:  Electrical Stimulation:         mins  54937 ( );  Mechanical Traction:         mins  11502;     Timed Treatment:   10   mins   Total Treatment:     45   mins    PT SIGNATURE: Marya Paez PT   Electronically signed  DATE TREATMENT INITIATED: 9/6/2022    Initial Certification  Certification Period: 9/6/20226741mjes80/4/2022  I certify that the therapy services are furnished while this patient is under my care.  The services outlined above are required by this patient, and will be reviewed every 90 days.    Physician Signature:_____________________________________________             PHYSICIAN: Page Correa PA-C  NPI: [id number]                                      DATE:       Please sign and return via fax to [phone].. Thank you, [facility].

## 2022-09-08 ENCOUNTER — TREATMENT (OUTPATIENT)
Dept: PHYSICAL THERAPY | Facility: CLINIC | Age: 77
End: 2022-09-08

## 2022-09-08 DIAGNOSIS — R26.9 GAIT DISTURBANCE: ICD-10-CM

## 2022-09-08 DIAGNOSIS — R29.898 WEAKNESS OF LEFT LOWER EXTREMITY: Primary | ICD-10-CM

## 2022-09-08 PROCEDURE — 97116 GAIT TRAINING THERAPY: CPT | Performed by: PHYSICAL THERAPIST

## 2022-09-08 PROCEDURE — 97110 THERAPEUTIC EXERCISES: CPT | Performed by: PHYSICAL THERAPIST

## 2022-09-08 PROCEDURE — 97112 NEUROMUSCULAR REEDUCATION: CPT | Performed by: PHYSICAL THERAPIST

## 2022-09-08 NOTE — PROGRESS NOTES
"             Physical Therapy Treatment Note  VISIT: 7          Subjective    Bushra Vaz reports: Pt reports knee discomfort.  She feels like she is regressing.      Pre-treatment pain:  0 (stiff and sore in L knee)  Post-treatment pain:  0      Objective    Treatment    Exercise 1  Exercise Name 1: Nu-Step  Equipment/Resistance 1: level 6  Time: 5'  Exercise 2  Exercise Name 2: heel slide with strap  Exercise 3  Exercise Name 3: hooklying march  Exercise 4  Exercise Name 4: supine hip flexor/quad stretch  Exercise 5  Exercise Name 5: prone knee flexion  Exercise 6  Exercise Name 6: prone hip extension  Exercise 7  Exercise Name 7: passive hip flexor/quad stretch  Exercise 8  Exercise Name 8: sit<>stand practice  Exercise 9  Exercise Name 9: 8\" step lunge hip flexor stretch  Exercise 10  Exercise Name 10: side step ups with LLE WB  Exercise 11  Exercise Name 11: heel raises  Exercise 12  Exercise Name 12: Total Gym squats  Exercise 13  Exercise Name 13: gait with cane, cues for hip extension in LLE stance                 Assessment & Plan     Assessment    Assessment details: Pt indicates fatigue with LLE WB, closed-chain exercises.  Gait improved as she moved, loosened up, and gained confidence.    Plan  Plan details: Continue PT.  Progress LLE strength and gait as tolerated.               Timed:  Manual Therapy:       mins  94414;  Therapeutic Exercise: 20        mins  38697;     Neuromuscular Romero:   10     mins  33093;    Therapeutic Activity:          mins  21847;     Gait Trainin     mins  73973;     Ultrasound:          mins  48165;    Electrical Stimulation:         mins  71089 ( );    Untimed:  Electrical Stimulation:         mins  28523 ( );  Mechanical Traction:         mins  14661;  Canalith Repositioning       mins  09984    Timed Treatment:   38   mins   Total Treatment:     38   mins      Marya Paez, PT  Physical Therapist                    "

## 2022-09-13 ENCOUNTER — TREATMENT (OUTPATIENT)
Dept: PHYSICAL THERAPY | Facility: CLINIC | Age: 77
End: 2022-09-13

## 2022-09-13 DIAGNOSIS — R26.9 GAIT DISTURBANCE: ICD-10-CM

## 2022-09-13 DIAGNOSIS — R29.898 WEAKNESS OF LEFT LOWER EXTREMITY: Primary | ICD-10-CM

## 2022-09-13 PROCEDURE — 97110 THERAPEUTIC EXERCISES: CPT | Performed by: PHYSICAL THERAPIST

## 2022-09-13 PROCEDURE — 97112 NEUROMUSCULAR REEDUCATION: CPT | Performed by: PHYSICAL THERAPIST

## 2022-09-13 NOTE — PROGRESS NOTES
"             Physical Therapy Treatment Note  VISIT: 8          Subjective    Bushra Vaz reports: her hip feels sore.          Objective    Treatment    Exercise 1  Exercise Name 1: elliptical  Time: 2'  Exercise 2  Exercise Name 2: calf stretch on 1/2 foam roll  Exercise 3  Exercise Name 3: hip flexor stretch on 8\" step  Exercise 4  Exercise Name 4: side step ups with LLE WB  Exercise 5  Exercise Name 5: alternating heel taps on 8\" step  Exercise 6  Exercise Name 6: side stepping in bars  Exercise 7  Exercise Name 7: Rhomberg balance-solid surface  Exercise 8  Exercise Name 8: semi-tandem balance-solid surface  Exercise 9  Exercise Name 9: standing weight shift in bilateral stance  Exercise 10  Exercise Name 10: sit<>stand practice  Exercise 11  Exercise Name 11: Total Gym squats  Sets/Reps 11: 2 sets to fatigue  Exercise 12  Exercise Name 12: heel raises  Exercise 13  Exercise Name 13: weight shift with reach facing wall                 Assessment & Plan     Assessment    Assessment details: Pt notices fatigue and soreness in L hip in weight-bearing.  She will benefit from progressing strengthening, gait and balance training following 4 months of NWB status following femoral fx with ORIF.    Plan  Plan details: Continue PT.  Progress exercise, gait, and balance as tolerated.               Timed:  Manual Therapy:         mins  25551;  Therapeutic Exercise:    20     mins  75541;     Neuromuscular Romero:    25    mins  62540;    Therapeutic Activity:          mins  47333;     Gait Training:           mins  37890;     Ultrasound:          mins  24091;    Electrical Stimulation:         mins  59334 ( );    Untimed:  Electrical Stimulation:         mins  79715 ( );  Mechanical Traction:         mins  90993;  Canalith Repositioning       mins  79354    Timed Treatment:   45   mins   Total Treatment:     45   mins      Marya Paez, PT  Physical Therapist                    "

## 2022-09-15 ENCOUNTER — TREATMENT (OUTPATIENT)
Dept: PHYSICAL THERAPY | Facility: CLINIC | Age: 77
End: 2022-09-15

## 2022-09-15 DIAGNOSIS — R29.898 WEAKNESS OF LEFT LOWER EXTREMITY: Primary | ICD-10-CM

## 2022-09-15 DIAGNOSIS — R26.9 GAIT DISTURBANCE: ICD-10-CM

## 2022-09-15 PROCEDURE — 97112 NEUROMUSCULAR REEDUCATION: CPT | Performed by: PHYSICAL THERAPIST

## 2022-09-15 PROCEDURE — 97110 THERAPEUTIC EXERCISES: CPT | Performed by: PHYSICAL THERAPIST

## 2022-09-15 NOTE — PROGRESS NOTES
"             Physical Therapy Treatment Note  VISIT: 9          Subjective    Bushra Vaz reports: her hip and thigh are sore.          Objective    Treatment    Exercise 1  Exercise Name 1: Nu-Step  Equipment/Resistance 1: level 4  Time: 5'  Exercise 2  Exercise Name 2: LLE WB with R LE step up  Equipment/Resistance 2: 6\" step  Exercise 3  Exercise Name 3: straddle step, LLE WB with R LE step up  Equipment/Resistance 3: 6\" step  Exercise 4  Exercise Name 4: LLE step up  Equipment/Resistance 4: 4\" step  Exercise 5  Exercise Name 5: SL hip abduction  Sets/Reps 5: 2x10  Exercise 6  Exercise Name 6: prone hip extension with knee flexion  Sets/Reps 6: 2x10-assisted/cued  Exercise 7  Exercise Name 7: prone quad/hip flexor stretch-assisted  Exercise 8  Exercise Name 8: sit<>stand practice  Exercise 9  Exercise Name 9: Total Gym squats  Exercise 10  Exercise Name 10: gait facing mirror-manual cues for hip extension and erect posture  Exercise 11  Exercise Name 11: standing weight shift               Assessment & Plan     Assessment    Assessment details: Pt requires verbal and manual cues to improve gait quality and to activate L hip mm in open and closed chain exercises.    Plan  Plan details: Continue PT.  Progress LLE strength as tolerated to improve gait stability and overall functional mobility skills.               Timed:  Manual Therapy:         mins  36252;  Therapeutic Exercise:    25     mins  25819;     Neuromuscular Romero:    15    mins  92767;    Therapeutic Activity:          mins  00158;     Gait Training:           mins  10535;     Ultrasound:          mins  50023;    Electrical Stimulation:         mins  39475 ( );    Untimed:  Electrical Stimulation:         mins  90276 ( );  Mechanical Traction:         mins  36053;  Canalith Repositioning       mins  62310    Timed Treatment:   40   mins   Total Treatment:     40   mins      Marya Paez, PT  Physical Therapist                    "

## 2022-09-20 ENCOUNTER — TREATMENT (OUTPATIENT)
Dept: PHYSICAL THERAPY | Facility: CLINIC | Age: 77
End: 2022-09-20

## 2022-09-20 DIAGNOSIS — R26.9 GAIT DISTURBANCE: ICD-10-CM

## 2022-09-20 DIAGNOSIS — R29.898 WEAKNESS OF LEFT LOWER EXTREMITY: Primary | ICD-10-CM

## 2022-09-20 PROCEDURE — 97116 GAIT TRAINING THERAPY: CPT | Performed by: PHYSICAL THERAPIST

## 2022-09-20 PROCEDURE — 97110 THERAPEUTIC EXERCISES: CPT | Performed by: PHYSICAL THERAPIST

## 2022-09-20 NOTE — PROGRESS NOTES
"             Physical Therapy Treatment Note  VISIT: 10          Subjective    Bushra Vaz reports: her leg is feeling better.      Pre-treatment pain:  1  Post-treatment pain:  1      Objective    Treatment    Exercise 1  Exercise Name 1: Elliptical  Time: 2'  Exercise 2  Exercise Name 2: LLE WB with R LE step up  Equipment/Resistance 2: 8\" step  Exercise 3  Exercise Name 3: straddle step, LLE WB with R LE step up  Equipment/Resistance 3: 8\" step  Exercise 4  Exercise Name 4: squats in parallel bars  Exercise 5  Exercise Name 5: SL hip abduction  Sets/Reps 5: 3x10  Exercise 6  Exercise Name 6: prone hip extension  Sets/Reps 6: 3x10  Exercise 7  Exercise Name 7: prone quad/hip flexor stretch-assisted  Exercise 8  Exercise Name 8: sit<>stand practice  Exercise 9  Exercise Name 9: Total Gym squats  Exercise 10  Exercise Name 10: stairs-reciprocal with cane and rail  Exercise 11  Exercise Name 11: standing weight shift with UE reach  Exercise 12  Exercise Name 12: heel/toe raises  Exercise 13  Exercise Name 13: bridging on ball                 Assessment & Plan     Assessment    Assessment details: Pt notices improvement in her left leg.  She feels better about putting weight on it.    Plan  Plan details: Continue PT.  Progress LLE strength and gait stability as tolerated.               Timed:  Manual Therapy:         mins  79096;  Therapeutic Exercise:    30     mins  71539;     Neuromuscular Romero:        mins  72112;    Therapeutic Activity:          mins  87230;     Gait Training:      10     mins  98536;     Ultrasound:          mins  77849;    Electrical Stimulation:         mins  24652 ( );    Untimed:  Electrical Stimulation:         mins  22707 ( );  Mechanical Traction:         mins  43861;  Canalith Repositioning       mins  00695    Timed Treatment:   40   mins   Total Treatment:     40   mins      Marya Paez, PT  Physical Therapist                    "

## 2022-09-22 ENCOUNTER — TREATMENT (OUTPATIENT)
Dept: PHYSICAL THERAPY | Facility: CLINIC | Age: 77
End: 2022-09-22

## 2022-09-22 DIAGNOSIS — R26.9 GAIT DISTURBANCE: ICD-10-CM

## 2022-09-22 DIAGNOSIS — R29.898 WEAKNESS OF LEFT LOWER EXTREMITY: Primary | ICD-10-CM

## 2022-09-22 PROCEDURE — 97112 NEUROMUSCULAR REEDUCATION: CPT | Performed by: PHYSICAL THERAPIST

## 2022-09-22 PROCEDURE — 97110 THERAPEUTIC EXERCISES: CPT | Performed by: PHYSICAL THERAPIST

## 2022-09-22 NOTE — PROGRESS NOTES
"             Physical Therapy Treatment Note  VISIT: 11          Subjective    Bushra Vaz reports: she was sore after chi last session, but she does not have pain.        Objective    Treatment    Exercise 1  Exercise Name 1: Nu-Step  Equipment/Resistance 1: level 7  Time: 5'  Exercise 2  Exercise Name 2: LLE step up  Equipment/Resistance 2: 6\" and 8\" step  Exercise 3  Exercise Name 3: straddle step, LLE WB with R LE step up  Equipment/Resistance 3: 8\" step  Exercise 4  Exercise Name 4: squats in parallel bars  Exercise 5  Exercise Name 5: SL hip abduction  Sets/Reps 5: 3x10  Exercise 6  Exercise Name 6: prone hip extension  Sets/Reps 6: 3x10  Exercise 7  Exercise Name 7: prone quad/hip flexor stretc with strap  Exercise 8  Exercise Name 8: sit<>stand practice  Exercise 9  Exercise Name 9: Total Gym squats  Exercise 10  Exercise Name 10: glider lunges-forward/side  Exercise 11  Exercise Name 11: standing weight shift with UE reach  Exercise 12  Exercise Name 12: BP check: 159/100 after exercise (O2 sat 97%,  and decreasing with rest)                 Assessment & Plan     Assessment    Assessment details: Pt had elevated BP noted at end of session.  Other vital signs were WNL.  She reported feeling intermittently lightheaded.  She had only coffee and a small breakfast before coming to treatment today.    Plan  Plan details: Continue PT, addressing LLE weakness and gait/mobility deficits.  Pt's  was notified of BP reading.  They will get it checked again later today.               Timed:  Manual Therapy:         mins  65751;  Therapeutic Exercise:    30     mins  56793;     Neuromuscular Romero:    15    mins  04476;    Therapeutic Activity:          mins  83746;     Gait Training:           mins  04864;     Ultrasound:          mins  76492;    Electrical Stimulation:         mins  91340 ( );    Untimed:  Electrical Stimulation:         mins  69569 ( );  Mechanical Traction:         mins  " 56342;  Canalith Repositioning       mins  72722    Timed Treatment:   45   mins   Total Treatment:     45   mins      Marya Paez, PT  Physical Therapist

## 2022-09-26 ENCOUNTER — TELEPHONE (OUTPATIENT)
Dept: ORTHOPEDIC SURGERY | Facility: CLINIC | Age: 77
End: 2022-09-26

## 2022-09-27 ENCOUNTER — OFFICE VISIT (OUTPATIENT)
Dept: ORTHOPEDIC SURGERY | Facility: CLINIC | Age: 77
End: 2022-09-27

## 2022-09-27 VITALS
HEIGHT: 65 IN | BODY MASS INDEX: 32.46 KG/M2 | SYSTOLIC BLOOD PRESSURE: 154 MMHG | WEIGHT: 194.8 LBS | DIASTOLIC BLOOD PRESSURE: 102 MMHG

## 2022-09-27 DIAGNOSIS — Z98.890 STATUS POST SURGERY: ICD-10-CM

## 2022-09-27 DIAGNOSIS — S72.22XD CLOSED DISPLACED SUBTROCHANTERIC FRACTURE OF LEFT FEMUR WITH ROUTINE HEALING: ICD-10-CM

## 2022-09-27 DIAGNOSIS — Z96.652 HISTORY OF TOTAL KNEE ARTHROPLASTY, LEFT: ICD-10-CM

## 2022-09-27 DIAGNOSIS — M25.562 LATERAL KNEE PAIN, LEFT: ICD-10-CM

## 2022-09-27 DIAGNOSIS — M25.562 LEFT KNEE PAIN, UNSPECIFIED CHRONICITY: Primary | ICD-10-CM

## 2022-09-27 PROCEDURE — 99214 OFFICE O/P EST MOD 30 MIN: CPT | Performed by: PHYSICIAN ASSISTANT

## 2022-09-27 NOTE — PROGRESS NOTES
"    [facility] [place]        Subjective     CC: Left knee pain    HPI    Bushra Vaz is a 76 y.o. female.  Patient presents today after contacting clinic noting a 3-week history of worsening lateral left knee pain.  History of left displaced subtrochanteric/intertrochanteric hip fracture that was treated with IM nail by Dr. Contreras.  Last saw Dr. Contreras on 8/30/2022 and then started with outpatient PT.  She states even though she had some mild discomfort to the knee after surgery it quickly worsened when she started outpatient PT 3 weeks ago.  With PT, she describes doing various deep knee bending and squatting type exercises.  She also notes hyperflexion exercises to the knee.    Pain scale: 8+/10.  She has to use a cane to assist with ambulation.  Weightbearing causes an exacerbation of the pain.  Therefore, any type of walking activities she notes increased pain.  Sitting does help.  No reported numbness or tingling.    Overall, patient's symptoms are worsening for increasing left knee pain.  History of left TKA 2012.    ROS:    Constiutional:Pt denies fever, chills, nausea, or vomiting.  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    Patient is well nourished and well developed.        Ortho Exam  Left knee  Skin: Intact without any erythema, warmth.  Mild soft tissue swelling noted lateral aspect " Circumcision Note      Risks and benefits of circumcision explained to mother. All questions answered. Consent signed. Time out performed to verify infant and procedure. Infant prepped and draped in normal sterile fashion. Sucrose before and after procedure was given. 2ml of  1% Lidocaine is used as a dorsal penile block and was applied to penile area. A Gomco  clamp was used to perform procedure. Hemostasis noted. Sterile petroleum gauze applied to circumcised area. Infant tolerated the procedure well. Complications:  none.     Héctor Aguilar MD  2021,12:32 PM of the knee.  Prior TKA surgical incision site well-healed.  Motion: 0-120° passive and active motion with only some mild soreness.  Tenderness: Positive along lateral joint line and lateral soft tissue structures, IT band.  Instability: Lachman negative.  Varus and valgus stress negative  Straight leg raise: Intact.  Motor: Grossly intact Q/HS/TA/GS/EHL/P  Sensory: Grossly intact DP/SP/S/S/T nerve distributions.     Left hip  Stinchfield: Negative  Passive ER/IR of the hip: Negative  Axial load: Negative  Motion: Forward flexion 110 degrees, IR 25 degrees, ER 30 degrees.      Imaging/Labs/EMG Reviewed:  Ordered left knee and hip plain films.  Imaging read/interpreted by Dr. Contreras.    Imaging Results (Last 24 Hours)     Procedure Component Value Units Date/Time    XR Knee 3+ View With Neffs Left [[id number]] Resulted: 09/27/22 0936     Updated: 09/27/22 0937    Narrative:      Indication: Left knee pain     comparison: Todays xrays were compared to previous xrays from 8/30/2022      Impression:           Left Knee: Demonstrate well positioned knee arthroplasty components in   satisfactory alignment without evidence of wear, loosening, subsidence,   fracture, or osteolysis.  Compared to prior radiographs, there has been   interval screw breakage of the distal interlocking static screw.  Distal   dynamic screw remains intact.        Indication: Status post fixation left intertrochanteric hip fracture     Comparison: Todays xrays were compared to previous xrays from 8/30/2022     IMPRESSION:      AP pelvis, 2 views left hip: Radiographs demonstrate consolidation of the intertrochanteric hip fracture with increasing callus formation.  No evidence of hardware complication.  No change in bony alignment compared to prior radiographs.      Assessment:  1. Left knee pain, unspecified chronicity    2. Lateral knee pain, left    3. Status post surgery    4. Closed displaced subtrochanteric fracture of left femur with  routine healing    5. History of total knee arthroplasty, left        Plan:  1. Left knee lateral knee pain (questionable IT band irritation) in setting of prior TKA.  2. Status post left IM nail for displaced subtrochanteric femur fracture--appears stable on imaging but with patient having broken distal screw and increased lateral knee pain proceed with CT scan of left hip to assess healing at fracture site.  CT scan ordered and needs to be completed this week  3. Hold PT for now.  Only gentle range of motion exercises at home.  4. Recommend OTC Voltaren gel applied to the lateral aspect of the knee.  5. Discussed ice and elevation as well.  6. Continue use of cane to assist with ambulation.  7. Follow-up with Dr. Contreras after CT scan completed to discuss results and further treatment options.  8. Questions and concerns answered.      Page Correa PA-C  09/27/22  11:39 EDT      Dictated Utilizing Dragon Dictation.

## 2022-09-28 ENCOUNTER — HOSPITAL ENCOUNTER (OUTPATIENT)
Dept: CT IMAGING | Facility: HOSPITAL | Age: 77
Discharge: HOME | End: 2022-09-28
Admitting: PHYSICIAN ASSISTANT

## 2022-09-28 DIAGNOSIS — S72.22XD CLOSED DISPLACED SUBTROCHANTERIC FRACTURE OF LEFT FEMUR WITH ROUTINE HEALING: ICD-10-CM

## 2022-09-28 DIAGNOSIS — Z98.890 STATUS POST SURGERY: ICD-10-CM

## 2022-09-28 PROCEDURE — 73700 CT LOWER EXTREMITY W/O DYE: CPT

## 2022-09-30 ENCOUNTER — OFFICE VISIT (OUTPATIENT)
Dept: ORTHOPEDIC SURGERY | Facility: CLINIC | Age: 77
End: 2022-09-30

## 2022-09-30 VITALS
WEIGHT: 194.89 LBS | DIASTOLIC BLOOD PRESSURE: 84 MMHG | HEIGHT: 65 IN | BODY MASS INDEX: 32.47 KG/M2 | SYSTOLIC BLOOD PRESSURE: 125 MMHG

## 2022-09-30 DIAGNOSIS — Z98.890 STATUS POST SURGERY: ICD-10-CM

## 2022-09-30 DIAGNOSIS — S72.22XD CLOSED DISPLACED SUBTROCHANTERIC FRACTURE OF LEFT FEMUR WITH ROUTINE HEALING: ICD-10-CM

## 2022-09-30 DIAGNOSIS — M76.32 IT BAND SYNDROME, LEFT: Primary | ICD-10-CM

## 2022-09-30 PROCEDURE — 99213 OFFICE O/P EST LOW 20 MIN: CPT | Performed by: ORTHOPAEDIC SURGERY

## 2022-09-30 NOTE — PROGRESS NOTES
Orthopaedic Clinic Note: Hip Established Patient    Chief Complaint   Patient presents with   • Follow-up     Left Knee/Hip Pain, 4.5 months s/p IM nail for displaced subtrochanteric femur fracture 5/16/22, after CT Left Hip W/O 9/28/22        HPI    It has been 3  day(s) since Ms. Vaz's last visit. She returns to clinic today for follow-up CT scan of the left hip.  She continues to complain of lateral based knee pain.  Denies any pain in the hip.  She is here to discuss the pain in her knee that she rates 3/10 on the pain scale as well as of the results of the CT scan of the left hip.  Denies fevers chills or constitutional symptoms.  She is ambulating with assistance of a cane.    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    • Osteoarthritis    • Osteopenia 11/2020    FRAX risk 15% / 4.6%   • Vitamin B12 deficiency    • Vitamin D deficiency       Past Surgical History:   Procedure Laterality Date   • APPENDECTOMY  2010    during hysterectomy   • BREAST EXCISIONAL BIOPSY Right    • CATARACT EXTRACTION, BILATERAL  2002   • COLONOSCOPY  04/2018   • COLONOSCOPY W/ POLYPECTOMY  2013   • HIP TROCHANTERIC NAILING WITH INTRAMEDULLARY HIP SCREW Left 5/16/2022    Procedure: HIP TROCHANTERIC NAILING LEFT;  Surgeon: Satya Contreras MD;  [facility];  Service: Orthopedics;  Laterality: Left;   • REPLACEMENT TOTAL KNEE BILATERAL Bilateral 2012   • TOTAL ABDOMINAL HYSTERECTOMY WITH SALPINGO OOPHORECTOMY Bilateral 2010    Stage IIIa adeno squamous carcinoma      Family History   Problem Relation Age of Onset   • Lymphoma Mother         non-hodgkins   • Heart attack Father 70   • Colon cancer Sister 55   • Diabetes Sister         diet  "controlled   • Stomach cancer Brother 78        non-smoker   • Breast cancer Other    • Stroke Neg Hx    • Thyroid disease Neg Hx    • Ovarian cancer Neg Hx      Social History     Socioeconomic History   • Marital status:    Tobacco Use   • Smoking status: Former Smoker     Packs/day: 0.20     Types: Cigarettes     Quit date:      Years since quittin.7   • Smokeless tobacco: Never Used   • Tobacco comment: smoked socially as young adult   Substance and Sexual Activity   • Alcohol use: Yes     Comment: 2-3 drinks weekly   • Drug use: No   • Sexual activity: Not Currently      Current Outpatient Medications on File Prior to Visit   Medication Sig Dispense Refill   • acetaminophen (TYLENOL) 500 MG tablet Take 2 tablets by mouth Every 8 (Eight) Hours.       No current facility-administered medications on file prior to visit.      Allergies   Allergen Reactions   • Atorvastatin      Joint pains   • Pravastatin Sodium      Ankle pain        Review of Systems   Constitutional: Negative.    HENT: Negative.    Eyes: Negative.    Respiratory: Negative.    Cardiovascular: Negative.    Gastrointestinal: Negative.    Endocrine: Negative.    Genitourinary: Negative.    Musculoskeletal: Positive for arthralgias.   Skin: Negative.    Allergic/Immunologic: Negative.    Neurological: Negative.    Hematological: Negative.    Psychiatric/Behavioral: Negative.         The patient's Review of Systems was personally reviewed and confirmed as accurate.    Physical Exam  Blood pressure 125/84, height 165.1 cm (65\"), weight 88.4 kg (194 lb 14.2 oz), not currently breastfeeding.    Body mass index is 32.43 kg/m².    GENERAL APPEARANCE: awake, alert, oriented, in no acute distress and well developed, well nourished  LUNGS:  breathing nonlabored  EXTREMITIES: no clubbing, cyanosis  PERIPHERAL PULSES: palpable dorsalis pedis and posterior tibial pulses bilaterally.    GAIT:  Antalgic            Hip Exam:  Left    RANGE OF " MOTION:  EXTENSION/FLEXION:  normal (0-110 degrees)  IR (at 90 degrees of flexion):  20  ER (at 90 degrees of flexion):  35  PAIN WITH HIP MOTION:  no  PAIN WITH LOGROLL:  no     STINCHFIELD TEST: negative    STRENGTH:  ABDUCTOR:  5/5  ADDUCTOR:  5/5  HIP FLEXION:  5/5    GREATER TROCHANTER BURSAL PAIN:  No  Tender to palpation over IT band laterally extending to Gerdy's tubercle    SENSATION TO LIGHT TOUCH:  DEEP PERONEAL/SUPERFICIAL PERONEAL/SURAL/SAPHENOUS/TIBIAL:   intact    EDEMA:  no  ERYTHEMA:  no  WOUNDS/INCISIONS:   yes, well healed surgical incision without evidence of erythema or drainage  _________________________________________________________________  _________________________________________________________________    RADIOGRAPHIC FINDINGS:   CT scan left hip from 9/20/2022 was personally interpreted.  Radiographs demonstrate interval consolidation of the comminuted peritrochanteric fracture with abundant callus formation.  Fracture lines remain visible with incomplete bridging of the fracture sites.    Assessment/Plan:   Diagnosis Plan   1. It band syndrome, left     2. Status post surgery     3. Closed displaced subtrochanteric fracture of left femur with routine healing       I reviewed CT scan findings with the patient.  Based on the CT scan findings, there does appear to be incomplete healing of the fracture but ongoing callus formation.  Given the broken screw, I do believe this is going to dynamize the fracture and allow for completion of healing.  In regards to her lateral based knee pain, I do believe this is secondary to IT band irritation and irritation of the IT bursa.  I discussed topical anti-inflammatory.  Pennsaid samples were provided and the patient was instructed to obtain Voltaren gel over-the-counter.  I will see her back in 6 weeks for repeat assessment with x-ray 2 views left femur on return.    Satya Contreras MD  09/30/22  11:47 EDT

## 2022-10-06 ENCOUNTER — OFFICE VISIT (OUTPATIENT)
Dept: FAMILY MEDICINE CLINIC | Facility: CLINIC | Age: 77
End: 2022-10-06

## 2022-10-06 VITALS
WEIGHT: 197 LBS | HEART RATE: 84 BPM | TEMPERATURE: 98.6 F | HEIGHT: 65 IN | SYSTOLIC BLOOD PRESSURE: 135 MMHG | DIASTOLIC BLOOD PRESSURE: 88 MMHG | OXYGEN SATURATION: 100 % | BODY MASS INDEX: 32.82 KG/M2 | RESPIRATION RATE: 16 BRPM

## 2022-10-06 DIAGNOSIS — Z23 NEED FOR VACCINATION: ICD-10-CM

## 2022-10-06 DIAGNOSIS — R53.83 FATIGUE, UNSPECIFIED TYPE: ICD-10-CM

## 2022-10-06 DIAGNOSIS — Z86.73 HISTORY OF CVA (CEREBROVASCULAR ACCIDENT): ICD-10-CM

## 2022-10-06 DIAGNOSIS — C54.1 MALIGNANT NEOPLASM OF ENDOMETRIUM: ICD-10-CM

## 2022-10-06 DIAGNOSIS — Z00.00 ANNUAL PHYSICAL EXAM: Primary | ICD-10-CM

## 2022-10-06 DIAGNOSIS — Z00.00 MEDICARE ANNUAL WELLNESS VISIT, SUBSEQUENT: ICD-10-CM

## 2022-10-06 DIAGNOSIS — I48.91 ATRIAL FIBRILLATION WITH RVR: ICD-10-CM

## 2022-10-06 DIAGNOSIS — E78.2 MIXED HYPERLIPIDEMIA: ICD-10-CM

## 2022-10-06 PROCEDURE — 1170F FXNL STATUS ASSESSED: CPT | Performed by: FAMILY MEDICINE

## 2022-10-06 PROCEDURE — 1159F MED LIST DOCD IN RCRD: CPT | Performed by: FAMILY MEDICINE

## 2022-10-06 PROCEDURE — G0439 PPPS, SUBSEQ VISIT: HCPCS | Performed by: FAMILY MEDICINE

## 2022-10-06 PROCEDURE — 1126F AMNT PAIN NOTED NONE PRSNT: CPT | Performed by: FAMILY MEDICINE

## 2022-10-06 PROCEDURE — 99397 PER PM REEVAL EST PAT 65+ YR: CPT | Performed by: FAMILY MEDICINE

## 2022-10-06 PROCEDURE — 90662 IIV NO PRSV INCREASED AG IM: CPT | Performed by: FAMILY MEDICINE

## 2022-10-06 PROCEDURE — G0008 ADMIN INFLUENZA VIRUS VAC: HCPCS | Performed by: FAMILY MEDICINE

## 2022-10-06 NOTE — PROGRESS NOTES
Subjective   Bushra Vaz is a 77 y.o. female    Chief Complaint    Annual Physical Exam  Medicare Wellness  S/P left hip fracture with nail placed  Remote history of endometrial cancer  Atrial fibrillation   Previous stroke    History of Present Illness     The patient states that she began feeling pain approximately 1 week ago in her lower extremity of the hip where she had surgery in 05/2022, so she went in to see Dr. Contreras who performed a magnetic resonance imaging scan on her and found a broken screw in the lon that was placed during surgery. Dr Contreras told her that the bone had healed enough that he did not need to replace the screw. She states that he told her to come back in 6 weeks to follow-up with him to see how she is doing. Dr. Contreras told the patient to not attend physical therapy until she follows up with him in 6 weeks due to it being too strenuous on that area. She states that on 10/05/2022 she started having a sharp pain on her side over her rib cage that is tender to palpation. She reports that she was using a walker for 4 months post surgery which resulted in blisters on her bilateral hands. She reports that she is very happy with Dr. Contreras and that she is just very concerned about her condition post surgery. The adult male states that the patient was very pleased with her physical therapist Isabelle at [facility] and she will return to her when she is cleared to return. She states that overall, she has been doing well with her bilateral knees; however, the right knee has been inflamed so she can not sleep on her right side, but Dr Contreras gave her a topical medication to rub on her knee to help with the inflammation.    The patient states that she would like her influenza vaccine at this visit.    The patient is accompanied by an adult male.    The following portions of the patient's history were reviewed and updated as appropriate: allergies, current medications,  past social history and problem list    Review of Systems   Constitutional: Negative.  Negative for chills, fatigue and fever.   HENT: Negative.    Eyes: Negative.    Respiratory: Negative.  Negative for cough, chest tightness, shortness of breath and wheezing.    Cardiovascular: Negative.  Negative for chest pain, palpitations and leg swelling.   Gastrointestinal: Negative.  Negative for abdominal pain, nausea and vomiting.   Endocrine: Negative.  Negative for polydipsia, polyphagia and polyuria.   Genitourinary: Negative.  Negative for dysuria, frequency and urgency.   Musculoskeletal: Negative.  Negative for arthralgias, back pain and myalgias.   Skin: Negative.  Negative for color change and rash.   Allergic/Immunologic: Negative.    Neurological: Negative.  Negative for weakness and headaches.   Hematological: Negative.  Negative for adenopathy. Does not bruise/bleed easily.   Psychiatric/Behavioral: Negative.    All other systems reviewed and are negative.      Objective     Vitals:    10/06/22 1016   BP: 135/88   Pulse: 84   Resp: 16   Temp: 98.6 °F (37 °C)   SpO2: 100%       Physical Exam  Vitals and nursing note reviewed.   Constitutional:       General: She is not in acute distress.     Appearance: Normal appearance. She is well-developed. She is not ill-appearing, toxic-appearing or diaphoretic.   HENT:      Head: Normocephalic and atraumatic.      Right Ear: External ear normal.      Left Ear: External ear normal.   Eyes:      Conjunctiva/sclera: Conjunctivae normal.      Pupils: Pupils are equal, round, and reactive to light.   Neck:      Thyroid: No thyromegaly.      Vascular: No carotid bruit or JVD.   Cardiovascular:      Rate and Rhythm: Normal rate and regular rhythm.      Pulses: Normal pulses.      Heart sounds: Normal heart sounds. No murmur heard.  Pulmonary:      Effort: Pulmonary effort is normal. No respiratory distress.      Breath sounds: Normal breath sounds.   Abdominal:      General:  Bowel sounds are normal.      Palpations: Abdomen is soft. There is no mass.      Tenderness: There is no abdominal tenderness.   Musculoskeletal:         General: Tenderness present. No swelling. Normal range of motion.      Cervical back: Normal range of motion and neck supple.   Lymphadenopathy:      Cervical: No cervical adenopathy.   Skin:     General: Skin is warm and dry.      Findings: No lesion or rash.   Neurological:      Mental Status: She is alert and oriented to person, place, and time.      Cranial Nerves: No cranial nerve deficit.      Sensory: No sensory deficit.      Motor: No weakness.      Coordination: Coordination normal.      Gait: Gait normal.      Deep Tendon Reflexes: Reflexes are normal and symmetric.   Psychiatric:         Mood and Affect: Mood normal.         Behavior: Behavior normal.         Thought Content: Thought content normal.         Judgment: Judgment normal.         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       Neuro    History of CVA (cerebrovascular accident)   Other Visit Diagnoses     Annual physical exam    -  Primary    Medicare annual wellness visit, subsequent        Need for vaccination        Relevant Orders    Fluzone High-Dose 65+yrs ([id number]) (Completed)    Fatigue, unspecified type          Diagnoses       Codes Comments    Annual physical exam    -  Primary ICD-10-CM: Z00.00  ICD-9-CM: V70.0     Medicare annual wellness visit, subsequent     ICD-10-CM: Z00.00  ICD-9-CM: V70.0     Need for vaccination     ICD-10-CM: Z23  ICD-9-CM: V05.9     Mixed hyperlipidemia     ICD-10-CM: E78.2  ICD-9-CM: 272.2     Fatigue, unspecified type     ICD-10-CM: R53.83  ICD-9-CM: 780.79     History of CVA (cerebrovascular accident)     ICD-10-CM: Z86.73  ICD-9-CM: V12.54      Atrial fibrillation with RVR (HCC)     ICD-10-CM: I48.91  ICD-9-CM: 427.31     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  Discussed nutrition, physical activity, healthy weight, injury prevention, misuse of tobacco, alcohol and drugs, dental health, mental health, immunizations, screening    Part of this note may be an electronic transcription/translation of spoken language to printed text using the Dragon Dictation System.             Transcribed from ambient dictation for R Jaydon English MD by Erika Xiong.  10/06/22   11:55 EDT  I have personally performed the services described in this document as transcribed by the above individual, and it is both accurate and complete.  Patient verbalized consent to the visit recording.

## 2022-10-16 NOTE — PROGRESS NOTES
The ABCs of the Annual Wellness Visit  Subsequent Medicare Wellness Visit    Chief Complaint   Patient presents with   • Medicare Wellness-subsequent      Subjective   History of Present Illness:  Bushra Vaz is a 77 y.o. female who presents for a Subsequent Medicare Wellness Visit.    The following portions of the patient's history were reviewed and   updated as appropriate: allergies, current medications, past family history, past medical history, past social history, past surgical history and problem list.    Compared to one year ago, the patient feels her physical   health is worse.    Compared to one year ago, the patient feels her mental   health is the same.    Recent Hospitalizations:  This patient has had a [facility] admission record on file within the last 365 days.    Current Medical Providers:  Patient Care Team:  Noah English MD as PCP - General (Family Medicine)    Outpatient Medications Prior to Visit   Medication Sig Dispense Refill   • acetaminophen (TYLENOL) 500 MG tablet Take 2 tablets by mouth Every 8 (Eight) Hours.       No facility-administered medications prior to visit.       No opioid medication identified on active medication list. I have reviewed chart for other potential  high risk medication/s and harmful drug interactions in the elderly.          Aspirin is not on active medication list.  Aspirin use is not indicated based on review of current medical condition/s. Risk of harm outweighs potential benefits.  .    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   • Non morbid obesity   • Vaginal atrophy   • Osteoporosis DEXA scan 2016   • Osteopenia of multiple sites DEXA November 2020 FRAX risk 15% / 4.6% hip   • History low vitamin D level corrected on supplementation   • Leukocytosis   • History of CVA (cerebrovascular accident)   • Atrial fibrillation with RVR (HCC)  "    Advance Care Planning  has an advanced directive - a copy HAS NOT been provided    Review of Systems      Objective      Vitals:    10/06/22 1016   BP: 135/88   Pulse: 84   Resp: 16   Temp: 98.6 °F (37 °C)   SpO2: 100%   Weight: 89.4 kg (197 lb)   Height: 165.1 cm (65\")   PainSc: 0-No pain     BMI Readings from Last 1 Encounters:   10/06/22 32.78 kg/m²   BMI is above normal parameters. Recommendations include: nutrition counseling    Does the patient have evidence of cognitive impairment? No    Physical Exam            HEALTH RISK ASSESSMENT    Smoking Status:  Social History     Tobacco Use   Smoking Status Former   • Packs/day: 0.20   • Years: 15.00   • Pack years: 3.00   • Types: Cigarettes   • Quit date:    • Years since quittin.8   Smokeless Tobacco Never   Tobacco Comments    smoked socially as young adult     Alcohol Consumption:  Social History     Substance and Sexual Activity   Alcohol Use Yes    Comment: 2-3 drinks weekly     Fall Risk Screen:    ILIANA Fall Risk Assessment was completed, and patient is at HIGH risk for falls. Assessment completed on:10/6/2022    Depression Screening:  PHQ-2/PHQ-9 Depression Screening 10/6/2022   Retired PHQ-9 Total Score -   Retired Total Score -   Little Interest or Pleasure in Doing Things 0-->not at all   Feeling Down, Depressed or Hopeless 0-->not at all   PHQ-9: Brief Depression Severity Measure Score 0       Health Habits and Functional and Cognitive Screening:  Functional & Cognitive Status 10/4/2021   Do you have difficulty preparing food and eating? No   Do you have difficulty bathing yourself, getting dressed or grooming yourself? No   Do you have difficulty using the toilet? No   Do you have difficulty moving around from place to place? No   Do you have trouble with steps or getting out of a bed or a chair? No   Current Diet Well Balanced Diet   Dental Exam Up to date   Eye Exam Up to date   Exercise (times per week) 0 times per week   Current " Exercise Activities Include -   Do you need help using the phone?  No   Are you deaf or do you have serious difficulty hearing?  No   Do you need help with transportation? No   Do you need help shopping? No   Do you need help preparing meals?  No   Do you need help with housework?  No   Do you need help with laundry? No   Do you need help taking your medications? No   Do you need help managing money? No   Do you ever drive or ride in a car without wearing a seat belt? No   Have you felt unusual stress, anger or loneliness in the last month? No   Who do you live with? Spouse   If you need help, do you have trouble finding someone available to you? No   Have you been bothered in the last four weeks by sexual problems? No   Do you have difficulty concentrating, remembering or making decisions? No       Age-appropriate Screening Schedule:  Refer to the list below for future screening recommendations based on patient's age, sex and/or medical conditions. Orders for these recommended tests are listed in the plan section. The patient has been provided with a written plan.    Health Maintenance   Topic Date Due   • ZOSTER VACCINE (2 of 2) 04/18/2018   • TDAP/TD VACCINES (1 - Tdap) 10/06/2023 (Originally 9/29/1964)   • DXA SCAN  11/18/2022   • LIPID PANEL  05/17/2023   • MAMMOGRAM  03/08/2024   • INFLUENZA VACCINE  Completed              Assessment & Plan     CMS Preventative Services Quick Reference  Risk Factors Identified During Encounter  Fall Risk-High or Moderate  The above risks/problems have been discussed with the patient.  Follow up actions/plans if indicated are seen below in the Assessment/Plan Section.  Pertinent information has been shared with the patient in the After Visit Summary.    Diagnoses and all orders for this visit:    1. Annual physical exam (Primary)    2. Medicare annual wellness visit, subsequent    3. Need for vaccination  -     Fluzone High-Dose 65+yrs ([id number])    4. Mixed  hyperlipidemia    5. Fatigue, unspecified type    6. History of CVA (cerebrovascular accident)    7. Atrial fibrillation with RVR (HCC)    8. 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     An After Visit Summary and PPPS were given to the patient.

## 2022-10-19 ENCOUNTER — TELEPHONE (OUTPATIENT)
Dept: ORTHOPEDIC SURGERY | Facility: CLINIC | Age: 77
End: 2022-10-19

## 2022-10-19 NOTE — TELEPHONE ENCOUNTER
Patient called in regards to her left knee, she states that its very sore. She wants to know if Dr. Contreras could look at her X-ray once again. Please contact patient at [phone]

## 2022-10-19 NOTE — TELEPHONE ENCOUNTER
Called patient to let her know Dr. Contreras did not see anything out of the ordinary on her xray .  I did offer a sooner appointment.  She declined and said she will continue the Voltaren gel and keep her already scheduled appointment.    Dimitrios Call RT(R)

## 2022-11-11 ENCOUNTER — OFFICE VISIT (OUTPATIENT)
Dept: ORTHOPEDIC SURGERY | Facility: CLINIC | Age: 77
End: 2022-11-11

## 2022-11-11 VITALS
BODY MASS INDEX: 31.79 KG/M2 | DIASTOLIC BLOOD PRESSURE: 78 MMHG | SYSTOLIC BLOOD PRESSURE: 120 MMHG | HEIGHT: 65 IN | WEIGHT: 190.8 LBS

## 2022-11-11 DIAGNOSIS — M76.32 IT BAND SYNDROME, LEFT: ICD-10-CM

## 2022-11-11 DIAGNOSIS — S72.22XD CLOSED DISPLACED SUBTROCHANTERIC FRACTURE OF LEFT FEMUR WITH ROUTINE HEALING: Primary | ICD-10-CM

## 2022-11-11 PROCEDURE — 99213 OFFICE O/P EST LOW 20 MIN: CPT | Performed by: ORTHOPAEDIC SURGERY

## 2022-11-11 NOTE — PROGRESS NOTES
Orthopaedic Clinic Note: Hip Established Patient    Chief Complaint   Patient presents with   • Follow-up     6 week f/u ITB Syndrome, 6 months s/p IM nail for displaced subtrochanteric femur fracture 5/16/22        HPI    It has been 6  week(s) since Ms. Vaz's last visit. She returns to clinic today for follow-up IM nail placement for subtrochanteric femur fracture.  She is 6 months out from her injury.  Her pain has improved and she now rates it a 1/10 on the pain scale.  She still having some lateral based pain over Jenise's tubercle but no pain in the hip region.  Overall she is doing better.    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    • Osteoarthritis    • Osteopenia 11/2020    FRAX risk 15% / 4.6%   • Vitamin B12 deficiency    • Vitamin D deficiency       Past Surgical History:   Procedure Laterality Date   • APPENDECTOMY  2010    during hysterectomy   • BREAST EXCISIONAL BIOPSY Right    • CATARACT EXTRACTION, BILATERAL  2002   • COLONOSCOPY  04/2018   • COLONOSCOPY W/ POLYPECTOMY  2013   • HIP TROCHANTERIC NAILING WITH INTRAMEDULLARY HIP SCREW Left 5/16/2022    Procedure: HIP TROCHANTERIC NAILING LEFT;  Surgeon: Satya Contreras MD;  [facility]: [facility];  Service: Orthopedics;  Laterality: Left;   • REPLACEMENT TOTAL KNEE BILATERAL Bilateral 2012   • TOTAL ABDOMINAL HYSTERECTOMY WITH SALPINGO OOPHORECTOMY Bilateral 2010    Stage IIIa adeno squamous carcinoma      Family History   Problem Relation Age of Onset   • Lymphoma Mother         non-hodgkins   • Heart attack Father 70   • Colon cancer Sister 55   • Diabetes Sister         diet controlled   • Stomach cancer Brother 78        non-smoker   • Breast cancer Other    •  "Stroke Neg Hx    • Thyroid disease Neg Hx    • Ovarian cancer Neg Hx      Social History     Socioeconomic History   • Marital status:    Tobacco Use   • Smoking status: Former     Packs/day: 0.20     Years: 15.00     Pack years: 3.00     Types: Cigarettes     Quit date:      Years since quittin.8   • Smokeless tobacco: Never   • Tobacco comments:     smoked socially as young adult   Substance and Sexual Activity   • Alcohol use: Yes     Comment: 2-3 drinks weekly   • Drug use: No   • Sexual activity: Not Currently      Current Outpatient Medications on File Prior to Visit   Medication Sig Dispense Refill   • Diclofenac Sodium (VOLTAREN) 1 % gel gel Apply 4 g topically to the appropriate area as directed 4 (Four) Times a Day As Needed.     • [DISCONTINUED] acetaminophen (TYLENOL) 500 MG tablet Take 2 tablets by mouth Every 8 (Eight) Hours.       No current facility-administered medications on file prior to visit.      Allergies   Allergen Reactions   • Atorvastatin      Joint pains   • Pravastatin Sodium      Ankle pain        Review of Systems   Constitutional: Negative.    HENT: Negative.    Eyes: Negative.    Respiratory: Negative.    Cardiovascular: Negative.    Gastrointestinal: Negative.    Endocrine: Negative.    Genitourinary: Negative.    Musculoskeletal: Positive for arthralgias.   Skin: Negative.    Allergic/Immunologic: Negative.    Neurological: Negative.    Hematological: Negative.    Psychiatric/Behavioral: Negative.         The patient's Review of Systems was personally reviewed and confirmed as accurate.    Physical Exam  Blood pressure 120/78, height 165.1 cm (65\"), weight 86.5 kg (190 lb 12.8 oz), not currently breastfeeding.    Body mass index is 31.75 kg/m².    GENERAL APPEARANCE: awake, alert, oriented, in no acute distress and well developed, well nourished  LUNGS:  breathing nonlabored  EXTREMITIES: no clubbing, cyanosis  PERIPHERAL PULSES: palpable dorsalis pedis and " posterior tibial pulses bilaterally.    GAIT:  Trendelenberg            Hip Exam:  Left     RANGE OF MOTION:  EXTENSION/FLEXION:  normal (0-110 degrees)  IR (at 90 degrees of flexion):  20  ER (at 90 degrees of flexion):  35  PAIN WITH HIP MOTION:  no  PAIN WITH LOGROLL:  no      STINCHFIELD TEST: negative     STRENGTH:  ABDUCTOR:    5/5  ADDUCTOR:  5/5  HIP FLEXION:  5/5     GREATER TROCHANTER BURSAL PAIN:  No  Tender to palpation over IT band laterally extending to Gerdy's tubercle    SENSATION TO LIGHT TOUCH:  DEEP PERONEAL/SUPERFICIAL PERONEAL/SURAL/SAPHENOUS/TIBIAL:   intact    EDEMA:  no  ERYTHEMA:  no  WOUNDS/INCISIONS:   yes, well healed surgical incision without evidence of erythema or drainage  _________________________________________________________________  _________________________________________________________________    RADIOGRAPHIC FINDINGS:   Indication: Status post fixation left intertrochanteric hip fracture    Comparison: Todays xrays were compared to previous xrays from 9/27/2022    2 views left femur: Radiographs demonstrate interval consolidation of the intertrochanteric/subtrochanteric hip fracture with no evidence of further hardware complication.  Prior distal interlocking screw breakage remains unchanged in appearance with intact distal dynamic screw placement.  Total knee arthroplasty appears to be intact as well with no evidence of component loosening.    Assessment/Plan:   Diagnosis Plan   1. Closed displaced subtrochanteric fracture of left femur with routine healing  XR Femur 2 View Left    Ambulatory Referral to Physical Therapy Evaluate and treat, Ortho      2. It band syndrome, left  Ambulatory Referral to Physical Therapy Evaluate and treat, Ortho        At this point, I see adequate healing to resume physical therapy.  Referral to physical therapy was placed.  She is to work on gait training and strengthening.  I will see her back in 2 months for repeat evaluation.  If her  symptoms worsen, instructed her to call for immediate follow-up.    Sayta Contreras MD  11/11/22  11:42 EST

## 2022-11-14 ENCOUNTER — TREATMENT (OUTPATIENT)
Dept: PHYSICAL THERAPY | Facility: CLINIC | Age: 77
End: 2022-11-14

## 2022-11-14 DIAGNOSIS — R26.9 GAIT DISTURBANCE: ICD-10-CM

## 2022-11-14 DIAGNOSIS — R29.898 WEAKNESS OF LEFT LOWER EXTREMITY: Primary | ICD-10-CM

## 2022-11-14 PROCEDURE — 97162 PT EVAL MOD COMPLEX 30 MIN: CPT | Performed by: PHYSICAL THERAPIST

## 2022-11-14 PROCEDURE — 97110 THERAPEUTIC EXERCISES: CPT | Performed by: PHYSICAL THERAPIST

## 2022-11-14 NOTE — PROGRESS NOTES
"     Physical Therapy Initial Evaluation and Plan of Care    [address], [address]  [place], [address]    Patient: Bushra Vaz   : 1945  Diagnosis/ICD-10 Code:  The primary encounter diagnosis was Weakness of left lower extremity. A diagnosis of Gait disturbance was also pertinent to this visit.   Referring practitioner: Satya Contreras MD  Date of Initial Visit: Type: THERAPY  Noted: 2022  Today's Date: 2022  Patient seen for 12 sessions         Visit Diagnoses:    ICD-10-CM ICD-9-CM   1. Weakness of left lower extremity  R29.898 729.89   2. Gait disturbance  R26.9 781.2       Subjective Questionnaire: LEFS:     Subjective Evaluation    History of Present Illness  Onset date: May 2022.  Date of surgery: 2022  Mechanism of injury: Pt returns to resume PT after 6 week hold to allow continued healing of fracture and relative rest from exercise.  Pt reports no hip pain.  She still reports L knee pain.  Imaging indicates healing of fracture, no issues with hardware from old TKA, and stable hardware from ORIF without further progression of broken screw noted on earlier imaging.      Pt complains that she \"hobbles through the house\" without her cane, but uses it in the community.  She says she wants to be able to walk normally without her cane.          Patient Occupation: retired Pain  Current pain ratin  At best pain ratin  At worst pain rating: 3  Location: L knee  Relieving factors: rest  Aggravating factors: standing, stairs, ambulation and squatting  Progression: improved    Social Support  Lives in: one-story house (steps to garage with railing)  Lives with: spouse    Diagnostic Tests  Abnormal x-ray: healing femoral fracture, stable hardware.    Treatments  Previous treatment: physical therapy  Patient Goals  Patient goals for therapy: increased strength, improved balance and return to sport/leisure activities  Patient goal: wants to walk normally without " cane         Treatment  Exercise 1  Exercise Name 1: Education in HEP progression  Exercise 2  Exercise Name 2: supine clamshell with band  Equipment/Resistance 2: green band  Exercise 3  Exercise Name 3: bridging with band  Equipment/Resistance 3: green band  Exercise 4  Exercise Name 4: L single leg balance at counter  Exercise 5  Exercise Name 5: L single leg stance with R hip flexion  Exercise 6  Exercise Name 6: L single leg stance with R hip abduction             Objective          Neurological Testing     Sensation     Hip   Left Hip   Intact: light touch    Right Hip   Intact: light touch    Active Range of Motion     Additional Active Range of Motion Details  Flexion >90 degrees  Extension to neutral  Abduction WNL  Rotation WFL    Strength/Myotome Testing     Left Hip   Planes of Motion   Flexion: 4  Extension: 3  Abduction: 4-    Right Hip   Normal muscle strength     General Comments     Hip Comments   Pt ambulates with cane with mild deviation noted related to hip weakness.  Without use of cane, pt has much more pronounced Trendelenberg compensation related to L hip weakness.  Her gait is notably improved since she was last seen in PT more than 6 weeks ago.         Assessment & Plan     Assessment  Impairments: activity intolerance, impaired balance, impaired physical strength, lacks appropriate home exercise program and weight-bearing intolerance  Functional Limitations: carrying objects, lifting, walking, standing and stooping  Assessment details: Pt is 6 months post ORIF of L proximal femoral fracture.  She has had a prolonged recovery due to difficulty maintaining limited WB status early in her rehab with extension of NWB status for 2 additional months.  She then resumed rehab, but had another lapse in care for more than 6 weeks to allow relative rest and more time for fracture to heal.  She has shown significant improvement in overall functional mobility, but has significant L hip weakness which  limits gait stability and confidence.  She will benefit from PT intervention to address L hip weakness and gait stability/confidence.   Prognosis: good    Goals  Plan Goals: 4 weeks  Pt. demonstrates independence and compliance with initial HEP.  Pt. reports reduction in pain intensity to no worse than   /10 on NPRS.  AROM of  shows improvement over baseline measures.  Functional outcome measure is improved by    %/points.    8 weeks  Pt. demonstrates independence in advanced HEP for ongoing improvement.  AROM of   is sufficient for performance of daily activities.  L/R strength is improved to /5 to allow participation in desired activities.  Functional outcome measure is improved to %/points.          Plan  Therapy options: will be seen for skilled therapy services  Planned therapy interventions: abdominal trunk stabilization, balance/weight-bearing training, body mechanics training, flexibility, functional ROM exercises, gait training, home exercise program, therapeutic activities, stretching, strengthening, neuromuscular re-education and manual therapy  Frequency: 2x week  Duration in weeks: 8  Treatment plan discussed with: patient  Plan details: PT 2x/week for up to 8 weeks per POC, addressing noted deficits in functional strength of LLE affecting gait stability and confidence.        Timed:  Manual Therapy:         mins  88445;  Therapeutic Exercise:    15     mins  42113;     Neuromuscular Romero:        mins  14457;    Therapeutic Activity:          mins  60173;     Gait Training:           mins  06271;     Ultrasound:          mins  25518;    Electrical Stimulation:         mins  25984 ( );  Iontophoresis  ___ mins   41312    Untimed:  Electrical Stimulation:         mins  59351 ( );  Mechanical Traction:         mins  89642;     Timed Treatment:   15   mins   Total Treatment:     45   mins    PT SIGNATURE: Marya Paez PT   Electronically signed  DATE TREATMENT INITIATED:  11/14/2022    Initial Certification  Certification Period: 11/14/2022thru2/11/2023  I certify that the therapy services are furnished while this patient is under my care.  The services outlined above are required by this patient, and will be reviewed every 90 days.    Physician Signature:_____________________________________________             PHYSICIAN: Satya Contreras MD  NPI: [id number]                                      DATE:       Please sign and return via fax to [phone].. Thank you, [facility].

## 2022-11-16 ENCOUNTER — TREATMENT (OUTPATIENT)
Dept: PHYSICAL THERAPY | Facility: CLINIC | Age: 77
End: 2022-11-16

## 2022-11-16 DIAGNOSIS — R26.9 GAIT DISTURBANCE: ICD-10-CM

## 2022-11-16 DIAGNOSIS — R29.898 WEAKNESS OF LEFT LOWER EXTREMITY: Primary | ICD-10-CM

## 2022-11-16 PROCEDURE — 97112 NEUROMUSCULAR REEDUCATION: CPT | Performed by: PHYSICAL THERAPIST

## 2022-11-16 PROCEDURE — 97110 THERAPEUTIC EXERCISES: CPT | Performed by: PHYSICAL THERAPIST

## 2022-11-21 ENCOUNTER — TREATMENT (OUTPATIENT)
Dept: PHYSICAL THERAPY | Facility: CLINIC | Age: 77
End: 2022-11-21

## 2022-11-21 DIAGNOSIS — R26.9 GAIT DISTURBANCE: ICD-10-CM

## 2022-11-21 DIAGNOSIS — R29.898 WEAKNESS OF LEFT LOWER EXTREMITY: Primary | ICD-10-CM

## 2022-11-21 PROCEDURE — 97112 NEUROMUSCULAR REEDUCATION: CPT | Performed by: PHYSICAL THERAPIST

## 2022-11-21 PROCEDURE — 97110 THERAPEUTIC EXERCISES: CPT | Performed by: PHYSICAL THERAPIST

## 2022-11-21 PROCEDURE — 97530 THERAPEUTIC ACTIVITIES: CPT | Performed by: PHYSICAL THERAPIST

## 2022-11-21 NOTE — PROGRESS NOTES
"                   Physical Therapy Treatment Note     [address], [address]           [place], [place], [address]    VISIT: 14          Subjective    Bushra Vaz reports: she was a little sore after her last session.  Her knees are bothering her more than her hip this morning.      Objective    Treatment    Exercise 1  Exercise Name 1: Bicycle  Equipment/Resistance 1: level 3  Time: 5'  Exercise 2  Exercise Name 2: sidestep hip abduction facing plinth  Equipment/Resistance 2: green band  Sets/Reps 2: 20xea  Exercise 3  Exercise Name 3: alternating hip extension-hands on plinth  Equipment/Resistance 3: green band  Sets/Reps 3: 20xea  Exercise 4  Exercise Name 4: R march , LLE stance, R hand on plinth  Exercise 5  Exercise Name 5: chair squats-plinth in front, chair behind for cueing direction of motion  Exercise 6  Exercise Name 6: 6\" forward step ups-left leg  Sets/Reps 6: 2x15 (cues to use hand only for balance)  Exercise 7  Exercise Name 7: 4\" side step up-left leg  Sets/Reps 7: 2x15 (cues to use hands only for balance)  Exercise 8  Exercise Name 8: tandem stance  Sets/Reps 8: 1' in each foot position  Exercise 9  Exercise Name 9: Total Gym squats  Equipment/Resistance 9: level 10  Time 9: 3'  Exercise 10  Exercise Name 10: L 1/2 bridge (R ankle on L knee)  Sets/Reps 10: 3x10-5 sec hold  Exercise 11  Exercise Name 11: sidelying L hip abduction (assist in avoiding QL substitution)  Sets/Reps 11: 2x10-5 sec  Exercise 12  Exercise Name 12:  (verbal and manual cues to avoid substitution patterns)                 Assessment & Plan     Assessment    Assessment details: Pt requires repeated verbal and manual cues to avoid substitution patterns and to isolate hip stabilizers during closed chain activities.    Plan  Plan details: Continue PT, emphasizing L LE strength and stability to improve safety and confidence during gait/daily activities.               Timed:  Manual Therapy:         mins  03843;  Therapeutic " Exercise:    25     mins  21402;     Neuromuscular Romero:    15    mins  23623;    Therapeutic Activity:     15     mins  48185;     Gait Training:           mins  19114;     Ultrasound:          mins  98864;    Electrical Stimulation:         mins  17899 ( );    Untimed:  Electrical Stimulation:         mins  81048 ( );  Mechanical Traction:         mins  17845;  Canalith Repositioning       mins  06723    Timed Treatment:   55   mins   Total Treatment:     55   mins      Marya Paez PT  Physical Therapist

## 2022-11-23 ENCOUNTER — TREATMENT (OUTPATIENT)
Dept: PHYSICAL THERAPY | Facility: CLINIC | Age: 77
End: 2022-11-23

## 2022-11-23 DIAGNOSIS — R29.898 WEAKNESS OF LEFT LOWER EXTREMITY: Primary | ICD-10-CM

## 2022-11-23 PROCEDURE — 97110 THERAPEUTIC EXERCISES: CPT | Performed by: PHYSICAL THERAPIST

## 2022-11-23 PROCEDURE — 97112 NEUROMUSCULAR REEDUCATION: CPT | Performed by: PHYSICAL THERAPIST

## 2022-11-23 PROCEDURE — 97116 GAIT TRAINING THERAPY: CPT | Performed by: PHYSICAL THERAPIST

## 2022-11-23 NOTE — PROGRESS NOTES
"                   Physical Therapy Treatment Note     [address] [facility], [address]           [place], [place], [address]    VISIT: 15          Subjective    Bushra Vaz reports: no new complaints.        Objective    Treatment    Exercise 1  Exercise Name 1: Bicycle  Equipment/Resistance 1: level 3  Time: 5'  Exercise 2  Exercise Name 2: LLE stance with R side step tap  Equipment/Resistance 2: 6\" step  Sets/Reps 2: 20x  Exercise 3  Exercise Name 3: LLE forward step up  Equipment/Resistance 3: 6\" step  Sets/Reps 3: 20x  Exercise 4  Exercise Name 4: LLE side step up  Equipment/Resistance 4: 4\" step  Sets/Reps 4: 20x  Exercise 5  Exercise Name 5: sit<>stand, cues for forward wt shift and decreasing use of hands  Exercise 6  Exercise Name 6: Total gym squats  Equipment/Resistance 6: level 10  Sets/Reps 6: double leg x3', single leg each to tolerance  Exercise 7  Exercise Name 7: reverse glider lunge, LLE in WB  Sets/Reps 7: 15x  Exercise 8  Exercise Name 8: tandem stance  Sets/Reps 8: 1' in each foot position  Exercise 9  Exercise Name 9: Rhomberg stance with weight shift in 4 directions  Exercise 10  Exercise Name 10: single leg balance each leg  Time 10: 2x30\"  Exercise 11  Exercise Name 11: gait practice with and without cane                 Assessment & Plan     Assessment    Assessment details: Pt requires frequent cues throughout treatment session to avoid compensatory strategies and to perform exercises effectively for desired muscle utilization.  She demonstrates gradually improving ability to stabilize L hip during single leg stance.  She will benefit from progressive strengthening, particularly of L hip and knee, to improve functional mobility skills and gait safety and stability.  Pt caught her toe twice while trying to walk without cane during session today.    Plan  Plan details: Continue PT, addressing functional LE strength to improve mobility and gait safety.               Timed:  Manual Therapy:         " mins  40105;  Therapeutic Exercise:    20     mins  46890;     Neuromuscular Romero:    25    mins  62709;    Therapeutic Activity:          mins  98648;     Gait Training:      10     mins  98782;     Ultrasound:          mins  99047;    Electrical Stimulation:         mins  15479 ( );    Untimed:  Electrical Stimulation:         mins  60220 ( );  Mechanical Traction:         mins  61839;  Canalith Repositioning       mins  33752    Timed Treatment:   55   mins   Total Treatment:     55   mins      Marya Paez, PT  Physical Therapist

## 2022-11-28 ENCOUNTER — TREATMENT (OUTPATIENT)
Dept: PHYSICAL THERAPY | Facility: CLINIC | Age: 77
End: 2022-11-28

## 2022-11-28 DIAGNOSIS — R26.9 GAIT DISTURBANCE: ICD-10-CM

## 2022-11-28 DIAGNOSIS — R29.898 WEAKNESS OF LEFT LOWER EXTREMITY: Primary | ICD-10-CM

## 2022-11-28 PROCEDURE — 97112 NEUROMUSCULAR REEDUCATION: CPT | Performed by: PHYSICAL THERAPIST

## 2022-11-28 PROCEDURE — 97110 THERAPEUTIC EXERCISES: CPT | Performed by: PHYSICAL THERAPIST

## 2022-11-28 PROCEDURE — 97116 GAIT TRAINING THERAPY: CPT | Performed by: PHYSICAL THERAPIST

## 2022-11-28 NOTE — PROGRESS NOTES
"                   Physical Therapy Treatment Note     [address], [address]           [place], [address]    VISIT: 16          Subjective    Bushra Vaz reports: she is not sore today, but she feels tired.  She expresses frustration about length of time since injury and wanting to \"just be done with it.\"        Pre-treatment pain:  0  Post-treatment pain:  0      Objective    Treatment    Exercise 1  Exercise Name 1: Bicycle  Equipment/Resistance 1: level 3  Time: 5'  Exercise 2  Exercise Name 2: Total Gym L single leg squats  Equipment/Resistance 2: level 10  Sets/Reps 2: to fatigue  Exercise 3  Exercise Name 3: LLE forward step up  Equipment/Resistance 3: 6\" step  Sets/Reps 3: 20x  Exercise 4  Exercise Name 4: LLE side step up  Equipment/Resistance 4: 6\" step  Sets/Reps 4: 20x  Exercise 5  Exercise Name 5: sit<>stand, cues for forward wt shift and decreasing use of hands  Equipment/Resistance 5: 18 and 1/2\" high surface without use of hands (cues to control descent back to sitting)  Exercise 6  Exercise Name 6: bridging plus calms with band  Equipment/Resistance 6: green band  Exercise 7  Exercise Name 7: sidelying L hip abduction  Sets/Reps 7: 2x10 (many manual and verbal cues to avoid substitution with QL)  Exercise 8  Exercise Name 8: semi tandem stance on foam square  Sets/Reps 8: 1' in each foot position  Exercise 9  Exercise Name 9: Rhomberg stance with weight shift in 4 directions omn foam square  Exercise 10  Exercise Name 10: single leg balance each leg  Time 10: 2x30\"  Exercise 11  Exercise Name 11: gait practice with and without cane  Exercise 12  Exercise Name 12: stairs practice with cane and 1 rail  Sets/Reps 12: up and down 8 steps x 2                 Assessment & Plan     Assessment    Assessment details: Pt seemed to have more difficulty than usual today following cues/instructions for best exercise performance.  She requires encouragement and repeated cues to avoid substitution " patterns and to reduce reliance on hands during functional activities.    Plan  Plan details: Continue PT.  Progress functional strengthening of LLE and continue to work to improve gait quality and safety.                Timed:  Manual Therapy:         mins  84369;  Therapeutic Exercise:    15     mins  96413;     Neuromuscular Romero:    30    mins  92444;    Therapeutic Activity:          mins  36903;     Gait Training:      15     mins  28337;     Ultrasound:          mins  73588;    Electrical Stimulation:         mins  96100 ( );    Untimed:  Electrical Stimulation:         mins  86410 ( );  Mechanical Traction:         mins  03022;  Canalith Repositioning       mins  97573    Timed Treatment:   60   mins   Total Treatment:     60   mins      Marya Paez, PT  Physical Therapist

## 2022-12-01 ENCOUNTER — TREATMENT (OUTPATIENT)
Dept: PHYSICAL THERAPY | Facility: CLINIC | Age: 77
End: 2022-12-01

## 2022-12-01 DIAGNOSIS — R29.898 WEAKNESS OF LEFT LOWER EXTREMITY: Primary | ICD-10-CM

## 2022-12-01 PROCEDURE — 97112 NEUROMUSCULAR REEDUCATION: CPT | Performed by: PHYSICAL THERAPIST

## 2022-12-01 PROCEDURE — 97116 GAIT TRAINING THERAPY: CPT | Performed by: PHYSICAL THERAPIST

## 2022-12-01 PROCEDURE — 97110 THERAPEUTIC EXERCISES: CPT | Performed by: PHYSICAL THERAPIST

## 2022-12-01 NOTE — PROGRESS NOTES
Physical Therapy Daily Treatment Note                  [address] [facility], [address] [place]. [address]      Patient: Bushra Vaz   : 1945  Diagnosis/ICD-10 Code:  Weakness of left lower extremity [R29.898]  Referring practitioner: Satya Contreras MD  Date of Initial Visit: Type: THERAPY  Noted: 2022  Today's Date: 2022  Patient seen for 17 sessions             Subjective   Bushra Vaz reports: feeling really good; no pain and feeling stronger, just stiffness and want to get off the cane use.     Objective   See Exercise, Manual, and Modality Logs for complete treatment.       Assessment/Plan  Walk assessment shows patient tends to overcompensate her weight to cane in the right hand instead of using the SC for stability only. When cane is taken away gait shows compensating measures formed possibly over time with the cane. Pt was able to change gait and use of cane with cueing and will continue to work on this at home to work toward no cane use.   Progress per Plan of Care and Progress strengthening /stabilization /functional activity           Timed:  Manual Therapy:         mins  76199;  Therapeutic Exercise:    30     mins  55774;     Neuromuscular Romero:    12    mins  38376;    Therapeutic Activity:          mins  32367;     Gait Trainin     mins  27051;     Ultrasound:          mins  39813;    Electrical Stimulation:         mins  48070;  Iontophoresis          mins  33973    Untimed:  Electrical Stimulation:         mins  51840 ( );  Mechanical Traction:         mins  33974;   Fluidotherapy          mins  69592    Timed Treatment:   58   mins   Total Treatment:     58   mins        Virginia Anne PTA  Physical Therapist Assistant

## 2022-12-05 ENCOUNTER — TREATMENT (OUTPATIENT)
Dept: PHYSICAL THERAPY | Facility: CLINIC | Age: 77
End: 2022-12-05

## 2022-12-05 DIAGNOSIS — R29.898 WEAKNESS OF LEFT LOWER EXTREMITY: Primary | ICD-10-CM

## 2022-12-05 DIAGNOSIS — R26.9 GAIT DISTURBANCE: ICD-10-CM

## 2022-12-05 PROCEDURE — 97110 THERAPEUTIC EXERCISES: CPT | Performed by: PHYSICAL THERAPIST

## 2022-12-05 PROCEDURE — 97112 NEUROMUSCULAR REEDUCATION: CPT | Performed by: PHYSICAL THERAPIST

## 2022-12-05 NOTE — PROGRESS NOTES
Physical Therapy Daily Treatment Note                  [address] [facility], [address] [place]. [address]      Patient: Bushra Vaz   : 1945  Diagnosis/ICD-10 Code:  Weakness of left lower extremity [R29.898]  Referring practitioner: Satya Contreras MD  Date of Initial Visit: Type: THERAPY  Noted: 2022  Today's Date: 2022  Patient seen for 18 sessions             Subjective   Bushra Vaz reports: trying to focus on how she uses the cane and seems like it is better but has to stay really conscious of it.    Objective   See Exercise, Manual, and Modality Logs for complete treatment.       Assessment/Plan  Pt has the most difficulty with SL stance. Progressed to EC on foam with Romberg and Tandem.   Progress per Plan of Care and Progress strengthening /stabilization /functional activity           Timed:  Manual Therapy:         mins  85739;  Therapeutic Exercise:    30     mins  61178;     Neuromuscular Romero:    12    mins  67375;    Therapeutic Activity:          mins  53665;     Gait Training:           mins  61225;     Ultrasound:          mins  18937;    Electrical Stimulation:         mins  40684;  Iontophoresis          mins  53885    Untimed:  Electrical Stimulation:         mins  85671 ( );  Mechanical Traction:         mins  44313;   Fluidotherapy          mins  45359    Timed Treatment:   42   mins   Total Treatment:     42   mins        Virginia Anne PTA  Physical Therapist Assistant

## 2022-12-08 ENCOUNTER — TREATMENT (OUTPATIENT)
Dept: PHYSICAL THERAPY | Facility: CLINIC | Age: 77
End: 2022-12-08

## 2022-12-08 DIAGNOSIS — R29.898 WEAKNESS OF LEFT LOWER EXTREMITY: Primary | ICD-10-CM

## 2022-12-08 DIAGNOSIS — R26.9 GAIT DISTURBANCE: ICD-10-CM

## 2022-12-08 PROCEDURE — 97112 NEUROMUSCULAR REEDUCATION: CPT | Performed by: PHYSICAL THERAPIST

## 2022-12-08 PROCEDURE — 97110 THERAPEUTIC EXERCISES: CPT | Performed by: PHYSICAL THERAPIST

## 2022-12-08 PROCEDURE — 97530 THERAPEUTIC ACTIVITIES: CPT | Performed by: PHYSICAL THERAPIST

## 2022-12-08 NOTE — PROGRESS NOTES
Physical Therapy Daily Treatment Note                  [address] [facility], [address] [place]. [address]      Patient: Bushra Vaz   : 1945  Diagnosis/ICD-10 Code:  Weakness of left lower extremity [R29.898]  Referring practitioner: Satya Contreras MD  Date of Initial Visit: Type: THERAPY  Noted: 2022  Today's Date: 2022  Patient seen for 19 sessions             Subjective   Bushra Vaz reports: Noticed she sometimes gets really sore and cramping on her right side and thinks it is from her leaning on the cane too much. Didn't want to come in today because it was a dreary day and stiff this morning.     Pt reported feeling very good and glad she didn't cancel post exercise.     Objective          Ambulation     Quality of Movement During Gait   Trunk    Trunk (Left): Positive left lateral lean over stance limb.   Trunk (Right): Positive lateral lean over stance limb.     Pelvis    Pelvis (Left): Positive Trendelenburg.     Foot Alignment    Foot Alignment (Right): Positive flat foot.     (Foot flat most likely due to decrease in stance time on L to get off of L hip.)    See Exercise, Manual, and Modality Logs for complete treatment.       Assessment/Plan  Decrease in cueing through out session. Progressed core stabilization activities to reduce movement side to side when walking with no assistive device due to not using any core. Pt was able to ascend a normal chair with no UE assistance and a decrease in descending too quickly. Gait with no AD does seem better this week but can easily catch her toe on floor unless cued to lift feet up when following through. Gluteus weakness still noted on left side.   Progress per Plan of Care and Progress strengthening /stabilization /functional activity           Timed:  Manual Therapy:         mins  65205;  Therapeutic Exercise:    15     mins  56317;     Neuromuscular Romero:    24    mins  06924;    Therapeutic Activity:     10      mins  67633;     Gait Training:           mins  00843;     Ultrasound:          mins  12647;    Electrical Stimulation:         mins  93196;  Iontophoresis          mins  16793    Untimed:  Electrical Stimulation:         mins  92058 ( );  Mechanical Traction:         mins  98097;   Fluidotherapy          mins  93392    Timed Treatment:   49   mins   Total Treatment:     49   mins        Virginia Anne PTA  Physical Therapist Assistant

## 2022-12-12 ENCOUNTER — TREATMENT (OUTPATIENT)
Dept: PHYSICAL THERAPY | Facility: CLINIC | Age: 77
End: 2022-12-12

## 2022-12-12 DIAGNOSIS — R26.9 GAIT DISTURBANCE: ICD-10-CM

## 2022-12-12 DIAGNOSIS — R29.898 WEAKNESS OF LEFT LOWER EXTREMITY: Primary | ICD-10-CM

## 2022-12-12 PROCEDURE — 97112 NEUROMUSCULAR REEDUCATION: CPT | Performed by: PHYSICAL THERAPIST

## 2022-12-12 PROCEDURE — 97110 THERAPEUTIC EXERCISES: CPT | Performed by: PHYSICAL THERAPIST

## 2022-12-12 PROCEDURE — 97116 GAIT TRAINING THERAPY: CPT | Performed by: PHYSICAL THERAPIST

## 2022-12-12 NOTE — PROGRESS NOTES
"                   Physical Therapy Treatment Note     [address], [address]           [place], [address]    VISIT: 20          Subjective    Bushra Vaz reports: her knee still bothers her some.  She is back to driving herself and has been able to run errands on her own.          Objective    Treatment    Exercise 1  Exercise Name 1: Bicycle  Equipment/Resistance 1: level 4  Time: 6'  Exercise 2  Exercise Name 2: Total Gym squats  Equipment/Resistance 2: level 10  Sets/Reps 2: double leg 2' x2 and L single leg 1'x1  Exercise 3  Exercise Name 3: LLE forward step up  Equipment/Resistance 3: 6\" step  Sets/Reps 3: 2x10  Exercise 4  Exercise Name 4: LLE side step up  Equipment/Resistance 4: 6\" step  Sets/Reps 4: 2x10  Exercise 5  Exercise Name 5: sit<>stand, cues for forward wt shift and decreasing use of hands  Equipment/Resistance 5: ball in front to cue forward wt shift (cues to control descent back to sitting)  Sets/Reps 5: 10x  Exercise 6  Exercise Name 6: tandem stance  Time 6: 30\" each foot placement  Exercise 7  Exercise Name 7: L single leg support with R LE flex/abd/ext  Sets/Reps 7: 10x each direction  Exercise 8  Exercise Name 8: single leg balance  Time 8: 30\" x2 LLE  Exercise 9  Exercise Name 9: gait with and without cane-cues to increase toe clearance  Exercise 10  Exercise Name 10: gait with weighted ball trunk rotation to improve wt shift  Time 10: 30'x4 laps                 Assessment & Plan     Assessment    Assessment details: Pt demonstrates gradually improving confidence with gait and mobility.  She continues to demonstrate functional LLE weakness in single leg situations or with decreased reliance on hand support.    Plan  Plan details: Continue PT.  Progress functional LLE strength and stability as tolerated.               Timed:  Manual Therapy:         mins  69651;  Therapeutic Exercise:    20     mins  89775;     Neuromuscular Romero:    10    mins  99654;    Therapeutic Activity: "          mins  76035;     Gait Training:      15     mins  69098;     Ultrasound:          mins  09238;    Electrical Stimulation:         mins  76094 ( );    Untimed:  Electrical Stimulation:         mins  43151 ( );  Mechanical Traction:         mins  80418;  Canalith Repositioning       mins  78508    Timed Treatment:   45   mins   Total Treatment:     45   mins      Marya Paez PT  Physical Therapist

## 2022-12-15 ENCOUNTER — TREATMENT (OUTPATIENT)
Dept: PHYSICAL THERAPY | Facility: CLINIC | Age: 77
End: 2022-12-15

## 2022-12-15 DIAGNOSIS — R29.898 WEAKNESS OF LEFT LOWER EXTREMITY: Primary | ICD-10-CM

## 2022-12-15 DIAGNOSIS — R26.9 GAIT DISTURBANCE: ICD-10-CM

## 2022-12-15 PROCEDURE — 97110 THERAPEUTIC EXERCISES: CPT | Performed by: PHYSICAL THERAPIST

## 2022-12-15 PROCEDURE — 97116 GAIT TRAINING THERAPY: CPT | Performed by: PHYSICAL THERAPIST

## 2022-12-15 PROCEDURE — 97112 NEUROMUSCULAR REEDUCATION: CPT | Performed by: PHYSICAL THERAPIST

## 2022-12-15 NOTE — PROGRESS NOTES
"          Physical Therapy Progress Note  [address] [facility], [address]  [place], [address]        Patient: Bushra Vaz   : 1945  Diagnosis/ICD-10 Code:  The primary encounter diagnosis was Weakness of left lower extremity. A diagnosis of Gait disturbance was also pertinent to this visit.   Referring practitioner: Satya Contreras MD  Date of Initial Visit: Type: THERAPY  Noted: 2022  Today's Date: 12/15/2022  Visit:  21     Bushra Vaz reports: Mild L knee soreness.    Subjective     Treatment  Exercise 1  Exercise Name 1: Reassessment  Exercise 2  Exercise Name 2: Total Gym squats  Equipment/Resistance 2: level 10  Sets/Reps 2: double leg 2'x1 and L single leg 1'x3  Exercise 3  Exercise Name 3: LLE forward step up  Equipment/Resistance 3: 6\" step  Sets/Reps 3: 20x  Exercise 4  Exercise Name 4: LLE side step up  Equipment/Resistance 4: 6\" step  Sets/Reps 4: 20x  Exercise 5  Exercise Name 5: sit<>stand, cues for forward wt shift and decreasing use of hands  Equipment/Resistance 5: ball in front to cue forward wt shift (cues to control descent back to sitting)  Sets/Reps 5: 5x  Exercise 6  Exercise Name 6: L single leg balance at countertop with L OH reach  Exercise 7  Exercise Name 7: weight shift onto LLE facing wall with L UE wall slide  Exercise 8  Exercise Name 8: Closed chain L hip abduction, R LE wall slide  Exercise 9  Exercise Name 9: gait with and without cane-cues to increase toe clearance  Exercise 10  Exercise Name 10: gait with weighted ball trunk rotation to improve wt shift  Time 10: 30'x4 laps  Exercise 11  Exercise Name 11: stairs x2-cues for maintaining reciprocal pattern on descent  Exercise 12  Exercise Name 12: L hip abduction in sidelying  Sets/Reps 12: 20x  Exercise 13  Exercise Name 13: L 1/2 bridge  Sets/Reps 13: 20x5 sec  Exercise 14  Exercise Name 14: prone hip extension  Sets/Reps 14: 10x each             Objective          Strength/Myotome Testing     Left Hip "   Planes of Motion   Flexion: 4+  Extension: 3+  Abduction: 3+        Assessment & Plan     Assessment    Assessment details: Pt is gradually increasing ability to support her weight on LLE alone.  She notices LLE fatigue with repeated or sustained L single leg stance.  She will benefit from ongoing PT intervention to progress functional LLE strength and endurance.    Goals  Plan Goals: Plan Goals: 4 weeks  1)Pt. demonstrates independence and compliance with initial HEP-partially met, may not use best form at home as cues are required in clinic.  2)Pt. reports reduction in pain intensity to no worse than 2/10 on NPRS-MET.  3)AROM of L hip shows improvement over baseline measures-MET.  4)Functional outcome measure is improved by 10 points-pending-not administered today.    8 weeks  1) Pt. demonstrates independence in advanced HEP for ongoing improvement.  2)AROM of L hip is sufficient for performance of daily activities-MET.  3)L hip abduction strength is improved to 4/5 for improved gait stability and functional independence.  4)Functional outcome measure is improved to 50/80 points, indicating improving functional abilities.     Plan  Therapy options: will be seen for skilled therapy services  Plan details: Continue PT.  PT has elected to take off the week before Abhishek.  Resume the following week.               Timed:  Manual Therapy:         mins  26363;  Therapeutic Exercise:    30     mins  67597;     Neuromuscular Romero:    15    mins  04531;    Therapeutic Activity:          mins  02203;     Gait Training:      10     mins  69282;     Ultrasound:          mins  45656;    Electrical Stimulation:         mins  82995 ( );    Untimed:  Electrical Stimulation:         mins  99230 ( );  Mechanical Traction:         mins  80279;     Timed Treatment:   55   mins   Total Treatment:     55   mins      Marya Paez PT  Physical Therapist

## 2022-12-28 ENCOUNTER — TREATMENT (OUTPATIENT)
Dept: PHYSICAL THERAPY | Facility: CLINIC | Age: 77
End: 2022-12-28

## 2022-12-28 DIAGNOSIS — R29.898 WEAKNESS OF LEFT LOWER EXTREMITY: Primary | ICD-10-CM

## 2022-12-28 DIAGNOSIS — R26.9 GAIT DISTURBANCE: ICD-10-CM

## 2022-12-28 PROCEDURE — 97112 NEUROMUSCULAR REEDUCATION: CPT | Performed by: PHYSICAL THERAPIST

## 2022-12-28 PROCEDURE — 97110 THERAPEUTIC EXERCISES: CPT | Performed by: PHYSICAL THERAPIST

## 2022-12-28 PROCEDURE — 97116 GAIT TRAINING THERAPY: CPT | Performed by: PHYSICAL THERAPIST

## 2022-12-28 NOTE — PROGRESS NOTES
"                   Physical Therapy Treatment Note     [address], [address]           [place], [address]    VISIT: 22          Subjective    Bushra Vaz reports: she can tell her leg is getting stronger.  Her knee is also feeling better.      Pre-treatment pain:  0  Post-treatment pain:  0      Objective    Treatment    Exercise 1  Exercise Name 1: Bicycle  Equipment/Resistance 1: level 3  Time: 5'  Exercise 2  Exercise Name 2: Total Gym squats  Equipment/Resistance 2: level 10  Sets/Reps 2: double leg 2'x1 and L single leg 3'x1  Exercise 3  Exercise Name 3: LLE forward step up  Equipment/Resistance 3: 8\" step  Sets/Reps 3: 20x  Exercise 4  Exercise Name 4: LLE side step up  Equipment/Resistance 4: 8\" step  Sets/Reps 4: 10x  Exercise 5  Exercise Name 5: sit<>stand-hands on knees, cues for forward wt shift and decreasing use of hands  Equipment/Resistance 5:  (cues for froward wt shift and to control descent back to sitting)  Sets/Reps 5: 8x  Exercise 6  Exercise Name 6: L single leg balance at countertop  Exercise 7  Exercise Name 7: weight shift onto LLE facing wall with L UE wall slide  Exercise 8  Exercise Name 8: tandem balance  Time 8: 30\"x2 each foot position  Exercise 9  Exercise Name 9: gait without cane-3# DB's in each hand with cues for reciprocal arm swing  Sets/Reps 9: 30'x4 laps  Exercise 10  Exercise Name 10: up/down 1 flight of stairs  Equipment/Resistance 10: cane, 1 rail, CG to SBA  Exercise 11  Exercise Name 11: standing hip abduction  Equipment/Resistance 11: yellow band  Sets/Reps 11: 20x each  Exercise 12  Exercise Name 12: standing hip extension  Equipment/Resistance 12: yellow band  Sets/Reps 12: 20x each                 Assessment & Plan     Assessment    Assessment details: Pt demonstrates improving LLE stability and reports decreasing L knee discomfort. She still requires cane to reduce gait compensations related to L hip weakness, but she is gradually gaining confidence in " LLE stability in weight bearing.     Plan  Plan details: Continue PT at least until MD follow up on January 12, 2023.  Progress gait, balance, and functional LE strengthening as tolerated.               Timed:  Manual Therapy:         mins  43193;  Therapeutic Exercise:    20     mins  81900;     Neuromuscular Romero:    20    mins  55973;    Therapeutic Activity:          mins  07851;     Gait Training:      10     mins  13041;     Ultrasound:          mins  23298;    Electrical Stimulation:         mins  33214 ( );    Untimed:  Electrical Stimulation:         mins  02213 ( );  Mechanical Traction:         mins  07618;  Canalith Repositioning       mins  99940    Timed Treatment:   50   mins   Total Treatment:     50   mins      Marya Paez, ENRIQUETA  Physical Therapist

## 2023-01-04 ENCOUNTER — TREATMENT (OUTPATIENT)
Dept: PHYSICAL THERAPY | Facility: CLINIC | Age: 78
End: 2023-01-04
Payer: MEDICARE

## 2023-01-04 DIAGNOSIS — R29.898 WEAKNESS OF LEFT LOWER EXTREMITY: Primary | ICD-10-CM

## 2023-01-04 DIAGNOSIS — R26.9 GAIT DISTURBANCE: ICD-10-CM

## 2023-01-04 PROCEDURE — 97112 NEUROMUSCULAR REEDUCATION: CPT | Performed by: PHYSICAL THERAPIST

## 2023-01-04 PROCEDURE — 97110 THERAPEUTIC EXERCISES: CPT | Performed by: PHYSICAL THERAPIST

## 2023-01-04 NOTE — PROGRESS NOTES
Physical Therapy Treatment Note     [address], [address]           [place], [address]    VISIT: 23          Subjective    Bushra Vaz reports: she is feeling better, but she still \"hobbles\", and says she wants to get to the point of not needing her cane all the time.      Pre-treatment pain:  0  Post-treatment pain:  0      Objective    Treatment    Exercise 1  Exercise Name 1: Bicycle  Equipment/Resistance 1: level 3  Time: 5'  Exercise 2  Exercise Name 2: chair squats facing table  Sets/Reps 2: 2x10  Exercise 3  Exercise Name 3: LLE forward step up  Equipment/Resistance 3: 8\" step  Sets/Reps 3: 20x (cane, no rail)  Exercise 4  Exercise Name 4: LLE side step up  Equipment/Resistance 4: 8\" step  Sets/Reps 4: 20x (cane, no rail)  Exercise 5  Exercise Name 5: glider lunges  Sets/Reps 5: 2x10 each leg  Exercise 6  Exercise Name 6: L single leg balance at countertop  Sets/Reps 6: multiple attempts, up to 4 second LLE balance without touch down  Exercise 7  Exercise Name 7: weight shift onto LLE facing wall with L UE wall slide (challenging)  Exercise 8  Exercise Name 8: side step with mini-squat  Exercise 9  Exercise Name 9: gait without cane (cues for arm swing and weight shift)                 Assessment & Plan     Assessment    Assessment details: Pt notices decreased stamina.  She notices improvement in LLE strength, but the leg does fatigue with repeated activities.  Pt will benefit from continuing PT intervention to address gait/balance, LLE stability and stamina, as well as activity tolerance.  However, she may want to take a break at some point, with option of returning in the future.    Plan  Plan details: Pt will see Dr. Contreras next week.  She will call to reschedule after that appointment to discuss next steps and options moving forward.               Timed:  Manual Therapy:         mins  27098;  Therapeutic Exercise:    30     mins  75146;     Neuromuscular Romero:    15     mins  19495;    Therapeutic Activity:          mins  81931;     Gait Training:           mins  63587;     Ultrasound:          mins  13544;    Electrical Stimulation:         mins  92544 ( );    Untimed:  Electrical Stimulation:         mins  73594 ( );  Mechanical Traction:         mins  75989;  Canalith Repositioning       mins  74638    Timed Treatment:   45   mins   Total Treatment:     45   mins      Marya Paez PT  Physical Therapist

## 2023-01-12 ENCOUNTER — OFFICE VISIT (OUTPATIENT)
Dept: ORTHOPEDIC SURGERY | Facility: CLINIC | Age: 78
End: 2023-01-12
Payer: MEDICARE

## 2023-01-12 VITALS
HEIGHT: 65 IN | BODY MASS INDEX: 32.12 KG/M2 | WEIGHT: 192.8 LBS | DIASTOLIC BLOOD PRESSURE: 80 MMHG | SYSTOLIC BLOOD PRESSURE: 128 MMHG

## 2023-01-12 DIAGNOSIS — Z09 SURGERY FOLLOW-UP: Primary | ICD-10-CM

## 2023-01-12 DIAGNOSIS — S72.22XD CLOSED DISPLACED SUBTROCHANTERIC FRACTURE OF LEFT FEMUR WITH ROUTINE HEALING: ICD-10-CM

## 2023-01-12 PROCEDURE — 99212 OFFICE O/P EST SF 10 MIN: CPT | Performed by: ORTHOPAEDIC SURGERY

## 2023-01-12 NOTE — PROGRESS NOTES
Orthopaedic Clinic Note: Hip Established Patient    Chief Complaint   Patient presents with   • Follow-up     2 month f/u ITB Syndrome, 6 months s/p IM nail for displaced subtrochanteric femur fracture 5/16/22        HPI    It has been 2  month(s) since Ms. Vaz's last visit. She returns to clinic today for follow-up of subtrochanteric fracture with subsequent IT band syndrome.  She comes clinic today rating her pain 0/10 on the pain scale.  She has a cane with her today but does not really require it to ambulate.  Overall she is doing better.    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    • Osteoarthritis    • Osteopenia 11/2020    FRAX risk 15% / 4.6%   • Vitamin B12 deficiency    • Vitamin D deficiency       Past Surgical History:   Procedure Laterality Date   • APPENDECTOMY  2010    during hysterectomy   • BREAST EXCISIONAL BIOPSY Right    • CATARACT EXTRACTION, BILATERAL  2002   • COLONOSCOPY  04/2018   • COLONOSCOPY W/ POLYPECTOMY  2013   • HIP TROCHANTERIC NAILING WITH INTRAMEDULLARY HIP SCREW Left 5/16/2022    Procedure: HIP TROCHANTERIC NAILING LEFT;  Surgeon: Satya Contreras MD;  Location: [facility];  Service: Orthopedics;  Laterality: Left;   • REPLACEMENT TOTAL KNEE BILATERAL Bilateral 2012   • TOTAL ABDOMINAL HYSTERECTOMY WITH SALPINGO OOPHORECTOMY Bilateral 2010    Stage IIIa adeno squamous carcinoma      Family History   Problem Relation Age of Onset   • Lymphoma Mother         non-hodgkins   • Heart attack Father 70   • Colon cancer Sister 55   • Diabetes Sister         diet controlled   • Stomach cancer Brother 78        non-smoker   • Breast cancer Other    • Stroke Neg Hx    • Thyroid disease Neg Hx    • Ovarian  "cancer Neg Hx      Social History     Socioeconomic History   • Marital status:    Tobacco Use   • Smoking status: Former     Packs/day: 0.20     Years: 15.00     Pack years: 3.00     Types: Cigarettes     Quit date: 1980     Years since quittin.0   • Smokeless tobacco: Never   • Tobacco comments:     smoked socially as young adult   Substance and Sexual Activity   • Alcohol use: Yes     Comment: 2-3 drinks weekly   • Drug use: No   • Sexual activity: Not Currently      Current Outpatient Medications on File Prior to Visit   Medication Sig Dispense Refill   • Diclofenac Sodium (VOLTAREN) 1 % gel gel Apply 4 g topically to the appropriate area as directed 4 (Four) Times a Day As Needed.       No current facility-administered medications on file prior to visit.      Allergies   Allergen Reactions   • Atorvastatin      Joint pains   • Pravastatin Sodium      Ankle pain        Review of Systems   Constitutional: Negative.    HENT: Negative.    Eyes: Negative.    Respiratory: Negative.    Cardiovascular: Negative.    Gastrointestinal: Negative.    Endocrine: Negative.    Genitourinary: Negative.    Musculoskeletal: Positive for arthralgias.   Skin: Negative.    Allergic/Immunologic: Negative.    Neurological: Negative.    Hematological: Negative.    Psychiatric/Behavioral: Negative.         The patient's Review of Systems was personally reviewed and confirmed as accurate.    Physical Exam  Blood pressure 128/80, height 165.1 cm (65\"), weight 87.5 kg (192 lb 12.8 oz), not currently breastfeeding.    Body mass index is 32.08 kg/m².    GENERAL APPEARANCE: awake, alert, oriented, in no acute distress and well developed, well nourished  LUNGS:  breathing nonlabored  EXTREMITIES: no clubbing, cyanosis  PERIPHERAL PULSES: palpable dorsalis pedis and posterior tibial pulses bilaterally.    GAIT:  Normal            Hip Exam:  Left     RANGE OF MOTION:  EXTENSION/FLEXION:  normal (0-110 degrees)  IR (at 90 degrees of " flexion):  20  ER (at 90 degrees of flexion):  35  PAIN WITH HIP MOTION:  no  PAIN WITH LOGROLL:  no      STINCHFIELD TEST: negative     STRENGTH:  ABDUCTOR:    5/5  ADDUCTOR:  5/5  HIP FLEXION:  5/5     GREATER TROCHANTER BURSAL PAIN:  No  Nontender to palpation about knee    SENSATION TO LIGHT TOUCH:  DEEP PERONEAL/SUPERFICIAL PERONEAL/SURAL/SAPHENOUS/TIBIAL:   intact    EDEMA:  no  ERYTHEMA:  no  WOUNDS/INCISIONS:   yes, well healed surgical incision without evidence of erythema or drainage  _________________________________________________________________  _________________________________________________________________    RADIOGRAPHIC FINDINGS:   Indication: Status post fixation left intertrochanteric hip fracture     Comparison: Todays xrays were compared to previous xrays from 9/27/2022    2 views left femur: Radiographs demonstrate continued consolidation of the intertrochanteric/subtrochanteric hip fracture with no evidence of further hardware complication.  Prior distal interlocking screw breakage remains unchanged in appearance with intact distal dynamic screw placement.  Total knee arthroplasty appears to be intact as well with no evidence of component loosening.    Assessment/Plan:   Diagnosis Plan   1. Surgery follow-up  XR Femur 2 View Left      2. Closed displaced subtrochanteric fracture of left femur with routine healing          Patient appears to be clinically and radiographically healed.  She is having no pain and able to ambulate without assistive devices.  I recommend activity as tolerated without restrictions.  I will see the patient back on an as-needed basis.    Satya Contreras MD  01/12/23  10:02 EST

## 2023-01-23 ENCOUNTER — DOCUMENTATION (OUTPATIENT)
Dept: PHYSICAL THERAPY | Facility: CLINIC | Age: 78
End: 2023-01-23
Payer: MEDICARE

## 2023-01-23 NOTE — PROGRESS NOTES
Discharge Summary  Discharge Summary from Physical Therapy Report      Patient: Bushra Vaz   : 1945  Diagnosis/ICD-10 Code:  There were no encounter diagnoses.   Referring practitioner: No ref. provider found  Date of Initial Visit: No linked episodes  Today's Date: 2023  Date of Last Visit: 23     Number of Visits: 12    Discharge Status of Patient: See MD Note dated: 23    Goals: Partially Met    Discharge Plan: Continue with current home exercise program as instructed    Comments:  Pt has elected to continue exercising independently at home.    Date of Discharge: 2023        Marya Paez, PT

## 2023-02-07 ENCOUNTER — TRANSCRIBE ORDERS (OUTPATIENT)
Dept: ADMINISTRATIVE | Facility: HOSPITAL | Age: 78
End: 2023-02-07
Payer: MEDICARE

## 2023-02-07 DIAGNOSIS — Z12.31 VISIT FOR SCREENING MAMMOGRAM: Primary | ICD-10-CM

## 2023-03-23 ENCOUNTER — OFFICE VISIT (OUTPATIENT)
Dept: OBSTETRICS AND GYNECOLOGY | Facility: CLINIC | Age: 78
End: 2023-03-23
Payer: MEDICARE

## 2023-03-23 VITALS
BODY MASS INDEX: 33.12 KG/M2 | HEIGHT: 65 IN | SYSTOLIC BLOOD PRESSURE: 140 MMHG | DIASTOLIC BLOOD PRESSURE: 88 MMHG | WEIGHT: 198.8 LBS

## 2023-03-23 DIAGNOSIS — N39.41 URGE INCONTINENCE: ICD-10-CM

## 2023-03-23 DIAGNOSIS — Z87.39 HISTORY OF OSTEOPENIA: ICD-10-CM

## 2023-03-23 DIAGNOSIS — N95.2 ATROPHY OF VAGINA: ICD-10-CM

## 2023-03-23 DIAGNOSIS — Z01.411 ENCOUNTER FOR GYNECOLOGICAL EXAMINATION WITH ABNORMAL FINDING: Primary | ICD-10-CM

## 2023-03-23 NOTE — PROGRESS NOTES
Chief Complaint  Bushra Vaz is a 77 y.o.  female presenting for Gynecologic Exam (Former patient of Dr. Gallego.  [facility].  )    History of Present Illness  Bushra is a pleasant 76yo nulligravid woman.  She had endometrial cancer and TRINA/BSO in .  She was followed for years, then paps discontinued in consultation with GynOnc.  She denies all vaginitis sx.  Denies dysuria.  She does have occasional urinary urge incontinence.  (No ALBERT)  This is relieved by emptying q 1.5 hrs.  She is not currently SA.  Other than her orthopedic surgeries, her ROS is negative.  She declines referral for colonoscopy (it is actually due this year).  She also declines referral for repeat DEXA scan.  (Was due in 2022)  She has hx osteopenia.  She does have appt soon for mammogram.    The following portions of the patient's history were reviewed and updated as appropriate: allergies, current medications, past family history, past medical history, past social history, past surgical history and problem list.    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     • Osteoarthritis    • Osteopenia 2020    FRAX risk 15% / 4.6%   • Vitamin B12 deficiency    • Vitamin D deficiency         Past Surgical History:   Procedure Laterality Date   • APPENDECTOMY      during hysterectomy   • BREAST EXCISIONAL BIOPSY Right    • CATARACT EXTRACTION, BILATERAL     •  "COLONOSCOPY  04/2018   • COLONOSCOPY W/ POLYPECTOMY  2013   • HIP TROCHANTERIC NAILING WITH INTRAMEDULLARY HIP SCREW Left 5/16/2022    Procedure: HIP TROCHANTERIC NAILING LEFT;  Surgeon: Satya Contreras MD;  Location: [facility];  Service: Orthopedics;  Laterality: Left;   • REPLACEMENT TOTAL KNEE BILATERAL Bilateral 2012   • TOTAL ABDOMINAL HYSTERECTOMY WITH SALPINGO OOPHORECTOMY Bilateral 2010    Stage IIIa adeno squamous carcinoma       Objective  /88   Ht 165.1 cm (65\")   Wt 90.2 kg (198 lb 12.8 oz)   Breastfeeding No   BMI 33.08 kg/m²     Physical Exam  Vitals and nursing note reviewed. Exam conducted with a chaperone present.   Constitutional:       Appearance: Normal appearance.   HENT:      Head: Normocephalic.   Neck:      Thyroid: No thyroid mass or thyromegaly.   Cardiovascular:      Rate and Rhythm: Normal rate and regular rhythm.      Heart sounds: Normal heart sounds. No murmur heard.  Pulmonary:      Effort: Pulmonary effort is normal. No respiratory distress.      Breath sounds: Normal breath sounds.   Chest:   Breasts:     Right: No inverted nipple, mass or nipple discharge.      Left: No inverted nipple, mass or nipple discharge.   Abdominal:      Palpations: Abdomen is soft. There is no mass.      Tenderness: There is no abdominal tenderness.   Genitourinary:     General: Normal vulva.      Labia:         Right: No rash, tenderness or lesion.         Left: No rash, tenderness or lesion.       Vagina: Erythema present. No vaginal discharge.      Uterus: Absent.       Adnexa:         Right: No mass or tenderness.          Left: No mass or tenderness.        Comments: Marked atrophic erythema with stippling.    No abnormal discharge.    Anus appears wnl.  No rectal exam performed.  Lymphadenopathy:      Upper Body:      Right upper body: No supraclavicular or axillary adenopathy.      Left upper body: No supraclavicular or axillary adenopathy.   Skin:     General: Skin is warm and dry. "   Neurological:      Mental Status: She is alert and oriented to person, place, and time.   Psychiatric:         Mood and Affect: Mood normal.         Behavior: Behavior normal.         Assessment/Plan   Diagnoses and all orders for this visit:    1. Encounter for gynecological examination with abnormal finding (Primary)    2. Atrophy of vagina    3. Urge incontinence    4. History of osteopenia    We discussed local estradiol Tx, vs OTC lubricants to help with atrophy.  She is asymptomatic at this time, and declines any Tx.      We discussed limiting bladder irritants (such as caffeine and high acid fruits/juices),  Kegel's exercises, emptying in timely way.      Couns re: calcium & vitamin D (written food lists given for each of them), walking & wt bearing exercise.      Procedures    40 to 64: Counseling/Anticipatory Guidance Discussed: nutrition, physical activity, screenings and self-breast exam    Return in about 1 year (around 3/23/2024) for Annual physical.    Tasneem Duong, APRN  03/23/2023

## 2023-04-04 ENCOUNTER — HOSPITAL ENCOUNTER (OUTPATIENT)
Dept: MAMMOGRAPHY | Facility: HOSPITAL | Age: 78
Discharge: HOME | End: 2023-04-04
Admitting: NURSE PRACTITIONER
Payer: MEDICARE

## 2023-04-04 DIAGNOSIS — Z12.31 VISIT FOR SCREENING MAMMOGRAM: ICD-10-CM

## 2023-04-04 PROCEDURE — 77067 SCR MAMMO BI INCL CAD: CPT

## 2023-04-04 PROCEDURE — 77063 BREAST TOMOSYNTHESIS BI: CPT | Performed by: RADIOLOGY

## 2023-04-04 PROCEDURE — 77063 BREAST TOMOSYNTHESIS BI: CPT

## 2023-04-04 PROCEDURE — 77067 SCR MAMMO BI INCL CAD: CPT | Performed by: RADIOLOGY

## 2023-04-14 ENCOUNTER — TELEPHONE (OUTPATIENT)
Dept: OBSTETRICS AND GYNECOLOGY | Facility: CLINIC | Age: 78
End: 2023-04-14
Payer: MEDICARE

## 2023-04-14 RX ORDER — NITROFURANTOIN 25; 75 MG/1; MG/1
100 CAPSULE ORAL 2 TIMES DAILY
Qty: 14 CAPSULE | Refills: 0 | Status: SHIPPED | OUTPATIENT
Start: 2023-04-14

## 2023-04-14 NOTE — TELEPHONE ENCOUNTER
Patient has been informed that she should take medication for UTI as directed.  She should increase fluids and avoid caffeine and carbonation.  Drink water/cranberry juice.  She will seek care in ED if she has fever, chills, or flank pain.  Bushra voiced understanding of instructions.

## 2023-04-14 NOTE — TELEPHONE ENCOUNTER
I attempted to contact patient per Eva.  No answer, but I left a voicemail message asking the patient to return my call.

## 2023-04-14 NOTE — TELEPHONE ENCOUNTER
Provider: BENJI CONCEPCION    Caller: KHADAR ROTHMAN    Relationship to Patient: SELF    Pharmacy: [facility]    Phone Number: [phone]    Reason for Call: POSSIBLE UTI    When was the patient last seen: 03/23/23    When did it start: 04/10/23    Characteristics of symptom/severity: BURNING WHEN URINATE  AND AFTERWARDS    PT IS WANTING TO KNOW IF BENJI CONCEPCION WOULD CALL A RX INTO HER PHARMACY FOR A UTI - IT STARTED ON 04/10/23 AND IS GETTING WORSE - PLEASE CALL THE PT TO LET HER KNOW.    THANK YOU!

## 2023-05-24 RX ORDER — NITROFURANTOIN 25; 75 MG/1; MG/1
100 CAPSULE ORAL 2 TIMES DAILY
Qty: 14 CAPSULE | Refills: 0 | OUTPATIENT
Start: 2023-05-24

## 2023-05-24 NOTE — TELEPHONE ENCOUNTER
Caller: ТатьянаBushra samayoa    Relationship: Self    Best call back number: [phone]    Requested Prescriptions:   Requested Prescriptions     Pending Prescriptions Disp Refills   • nitrofurantoin, macrocrystal-monohydrate, (Macrobid) 100 MG capsule 14 capsule 0     Sig: Take 1 capsule by mouth 2 (Two) Times a Day.        Pharmacy where request should be sent: [address] [phone] [facility] [phone] [facility]     Last office visit with prescribing clinician: 10/6/2022   Last telemedicine visit with prescribing clinician: Visit date not found   Next office visit with prescribing clinician: 10/9/2023     Additional details provided by patient: PATIENT IS OUT     Does the patient have less than a 3 day supply:  [x] Yes  [] No    Would you like a call back once the refill request has been completed: [] Yes [x] No    If the office needs to give you a call back, can they leave a voicemail: [] Yes [x] No    Cadance Dunaway, RegSched Rep   05/24/23 09:05 EDT

## 2023-05-26 RX ORDER — NITROFURANTOIN 25; 75 MG/1; MG/1
100 CAPSULE ORAL 2 TIMES DAILY
Qty: 14 CAPSULE | Refills: 0 | OUTPATIENT
Start: 2023-05-26

## 2023-05-26 RX ORDER — NITROFURANTOIN 25; 75 MG/1; MG/1
100 CAPSULE ORAL 2 TIMES DAILY
Qty: 14 CAPSULE | Refills: 0 | Status: SHIPPED | OUTPATIENT
Start: 2023-05-26

## 2023-06-02 ENCOUNTER — OFFICE VISIT (OUTPATIENT)
Dept: FAMILY MEDICINE CLINIC | Facility: CLINIC | Age: 78
End: 2023-06-02
Payer: MEDICARE

## 2023-06-02 VITALS
BODY MASS INDEX: 33.26 KG/M2 | TEMPERATURE: 98.7 F | DIASTOLIC BLOOD PRESSURE: 90 MMHG | SYSTOLIC BLOOD PRESSURE: 144 MMHG | OXYGEN SATURATION: 98 % | HEIGHT: 65 IN | WEIGHT: 199.6 LBS

## 2023-06-02 DIAGNOSIS — R35.0 URINARY FREQUENCY: Primary | ICD-10-CM

## 2023-06-02 DIAGNOSIS — R30.0 DYSURIA: ICD-10-CM

## 2023-06-02 DIAGNOSIS — L98.9 SKIN LESION OF LOWER EXTREMITY: ICD-10-CM

## 2023-06-02 LAB
BILIRUB BLD-MCNC: NEGATIVE MG/DL
CLARITY, POC: CLEAR
COLOR UR: YELLOW
EXPIRATION DATE: ABNORMAL
GLUCOSE UR STRIP-MCNC: NEGATIVE MG/DL
KETONES UR QL: NEGATIVE
LEUKOCYTE EST, POC: ABNORMAL
Lab: ABNORMAL
NITRITE UR-MCNC: NEGATIVE MG/ML
PH UR: 6 [PH] (ref 5–8)
PROT UR STRIP-MCNC: NEGATIVE MG/DL
RBC # UR STRIP: NEGATIVE /UL
SP GR UR: 1.02 (ref 1–1.03)
UROBILINOGEN UR QL: NORMAL

## 2023-06-02 RX ORDER — CEPHALEXIN 500 MG/1
500 CAPSULE ORAL 3 TIMES DAILY
Qty: 21 CAPSULE | Refills: 0 | Status: SHIPPED | OUTPATIENT
Start: 2023-06-02

## 2023-06-02 NOTE — PROGRESS NOTES
Subjective   Bushra Vaz is a 77 y.o. female  Urinary Frequency (Urinary frequency, currently on macrobid )      History of Present Illness     BUSHRA VAZ presents today to discuss urinary issues.    The patient's urinary symptoms have not resolved her urinary tract infection. The burning sensation is only slightly improved. She saw BENJI Will on 03/23/2023 for her annual exam and at that time everything was fine. She saw BENJI Will again on 04/14/2023, which was when the urinary tract infection symptoms started. The patient was prescribed Macrodantin. She denies any abnormal discharge or vaginal rash. The patient's urine burns and stings when she urinates. She urinates every 2 hours, and her urine is dark yellow. Her urination is not a stream, but it is more like a dribble. The patient denies any itching.    The following portions of the patient's history were reviewed and updated as appropriate: allergies, current medications, past social history and problem list    Review of Systems   Constitutional:  Negative for chills and fever.   Gastrointestinal:  Positive for abdominal pain. Negative for nausea and vomiting.   Genitourinary:  Positive for difficulty urinating, dysuria, frequency and urgency. Negative for hematuria.     Objective     Vitals:    06/02/23 1101   BP: 144/90   Temp: 98.7 °F (37.1 °C)   SpO2: 98%       Physical Exam  Vitals and nursing note reviewed.   Constitutional:       General: She is not in acute distress.     Appearance: Normal appearance. She is well-developed. She is not ill-appearing, toxic-appearing or diaphoretic.   Abdominal:      General: There is no distension.      Palpations: Abdomen is soft. There is no mass.      Tenderness: There is no abdominal tenderness. There is no guarding or rebound.   Musculoskeletal:         General: No swelling.   Skin:     General: Skin is warm and dry.      Coloration: Skin is not pale.   Neurological:      Mental Status:  She is alert.       Assessment & Plan     Diagnoses and all orders for this visit:    1. Urinary frequency (Primary)  -     POC Urinalysis Dipstick, Automated  -     Urine Culture - , Urine, Clean Catch; Future    2. Dysuria  -     Urine Culture - , Urine, Clean Catch; Future    3. Skin lesion of lower extremity  -     Ambulatory Referral to Dermatology    Other orders  -     cephalexin (Keflex) 500 MG capsule; Take 1 capsule by mouth 3 (Three) Times a Day.  Dispense: 21 capsule; Refill: 0    1. Urinary tract infection  - I will prescribe the patient Keflex.  - I will order a urine culture.  - I advised the patient to drink 24 ounces of water before she goes to the laboratory.    Transcribed from ambient dictation for Marian Bowden PA-C by Melissa Oglesby.  06/02/23   13:33 EDT    Patient or patient representative verbalized consent to the visit recording.  I have personally performed the services described in this document as transcribed by the above individual, and it is both accurate and complete.

## 2023-06-05 ENCOUNTER — LAB (OUTPATIENT)
Dept: LAB | Facility: HOSPITAL | Age: 78
End: 2023-06-05
Payer: MEDICARE

## 2023-06-05 DIAGNOSIS — R30.0 DYSURIA: ICD-10-CM

## 2023-06-05 DIAGNOSIS — R35.0 URINARY FREQUENCY: ICD-10-CM

## 2023-06-05 PROCEDURE — 87086 URINE CULTURE/COLONY COUNT: CPT

## 2023-06-07 LAB — BACTERIA SPEC AEROBE CULT: NO GROWTH

## 2023-06-15 DIAGNOSIS — R30.0 DYSURIA: Primary | ICD-10-CM

## 2023-06-15 RX ORDER — ESTRADIOL 0.1 MG/G
CREAM VAGINAL
Qty: 42.5 G | Refills: 11 | Status: SHIPPED | OUTPATIENT
Start: 2023-06-15

## 2023-06-16 ENCOUNTER — TELEPHONE (OUTPATIENT)
Dept: FAMILY MEDICINE CLINIC | Facility: CLINIC | Age: 78
End: 2023-06-16
Payer: MEDICARE

## 2023-06-16 NOTE — TELEPHONE ENCOUNTER
That is the generic version and apparently they are all just very expensive.  She could ask the pharmacist and see if they have anything less expensive but I was trying to be very generic because some of the creams cost is much as $150 a tube.  A tube of the cream will last her several months and possibly even a year.  She will not have to use it every day except for a few weeks and then she can go to 2 or 3 times a week.

## 2023-10-24 ENCOUNTER — OFFICE VISIT (OUTPATIENT)
Dept: FAMILY MEDICINE CLINIC | Facility: CLINIC | Age: 78
End: 2023-10-24
Payer: MEDICARE

## 2023-10-24 VITALS
OXYGEN SATURATION: 97 % | HEART RATE: 104 BPM | DIASTOLIC BLOOD PRESSURE: 82 MMHG | WEIGHT: 202.8 LBS | TEMPERATURE: 96.5 F | SYSTOLIC BLOOD PRESSURE: 142 MMHG | RESPIRATION RATE: 14 BRPM | BODY MASS INDEX: 33.79 KG/M2 | HEIGHT: 65 IN

## 2023-10-24 DIAGNOSIS — I48.91 ATRIAL FIBRILLATION, UNSPECIFIED TYPE: ICD-10-CM

## 2023-10-24 DIAGNOSIS — Z00.00 MEDICARE ANNUAL WELLNESS VISIT, SUBSEQUENT: ICD-10-CM

## 2023-10-24 DIAGNOSIS — E78.2 MIXED HYPERLIPIDEMIA: ICD-10-CM

## 2023-10-24 DIAGNOSIS — Z12.11 COLON CANCER SCREENING: ICD-10-CM

## 2023-10-24 DIAGNOSIS — Z00.00 ANNUAL PHYSICAL EXAM: Primary | ICD-10-CM

## 2023-10-24 DIAGNOSIS — R53.83 FATIGUE, UNSPECIFIED TYPE: ICD-10-CM

## 2023-10-24 DIAGNOSIS — Z86.73 HISTORY OF CVA (CEREBROVASCULAR ACCIDENT): ICD-10-CM

## 2023-10-24 DIAGNOSIS — Z23 NEED FOR VACCINATION: ICD-10-CM

## 2023-10-24 NOTE — PROGRESS NOTES
Subjective   Bushra Vaz is a 78 y.o. female      Chief Complaint  Annual physical exam  Medicare wellness visit  Hyperlipidemia  Previous stroke  Status post hip fracture       History of Present Illness  The patient presents today for annual physical examination and Medicare wellness visit. We will review her chronic medical problems and arrange for health maintenance testing as indicated.    The patient states that her hip is doing well. She does not think she will ever be totally back to normal. She denies any pain or soreness, but she hobbles along. She tries really hard to walk. She broke her hip and leg in 2 places and had a lon placed in there. She did physical therapy, but it never helped. She thinks her walking issue may be a mental thing where she is protecting her lower extremity.     The patient has had a urinary tract infection. She does not think it is completely gone. She has done 3 or 4 cultures of urine. She tried 3 or 4 different prescriptions and none of them resolved it. She has had some stinging and burning. She has tried Azo, but it does not do much. She saw Dr. Guerrier, urologist.     The patient states that her last colonoscopy was in 2018. She got a letter to schedule but does not want to have one. She asks about using Cologuard test. She has a family history of colon cancer in her sister. She is not fasting today.     She was told that she had a little atrial fibrillation when she was in the hospital.  She was evaluated by cardiology at the time and she was not put on blood thinners.     She is getting an influenza vaccine today. She is getting the pneumonia vaccine today. She needs another COVID-19 vaccine.     The following portions of the patient's history were reviewed and updated as appropriate: allergies, current medications, past social history and problem list.    Review of Systems   Constitutional: Negative.  Negative for chills, fatigue and fever.   HENT: Negative.     Eyes:  Negative.    Respiratory: Negative.  Negative for cough, chest tightness, shortness of breath and wheezing.    Cardiovascular: Negative.  Negative for chest pain, palpitations and leg swelling.   Gastrointestinal: Negative.  Negative for abdominal pain, nausea and vomiting.   Endocrine: Negative.  Negative for polydipsia, polyphagia and polyuria.   Genitourinary: Negative.  Negative for dysuria, frequency and urgency.   Musculoskeletal: Negative.  Negative for arthralgias, back pain and myalgias.   Skin: Negative.  Negative for color change and rash.   Allergic/Immunologic: Negative.    Neurological: Negative.  Negative for weakness and headaches.   Hematological: Negative.  Negative for adenopathy. Does not bruise/bleed easily.   Psychiatric/Behavioral: Negative.     All other systems reviewed and are negative.        Objective         Vitals:    10/24/23 0816   BP: 142/82   Pulse: 104   Resp: 14   Temp: 96.5 °F (35.8 °C)   SpO2: 97%         Physical Exam  Vitals and nursing note reviewed.   Constitutional:       General: She is not in acute distress.     Appearance: Normal appearance. She is well-developed. She is not ill-appearing, toxic-appearing or diaphoretic.   HENT:      Head: Normocephalic and atraumatic.      Right Ear: External ear normal.      Left Ear: External ear normal.   Eyes:      Conjunctiva/sclera: Conjunctivae normal.      Pupils: Pupils are equal, round, and reactive to light.   Neck:      Thyroid: No thyromegaly.      Vascular: No carotid bruit or JVD.   Cardiovascular:      Rate and Rhythm: Normal rate and regular rhythm.      Pulses: Normal pulses.      Heart sounds: Normal heart sounds. No murmur heard.  Pulmonary:      Effort: Pulmonary effort is normal. No respiratory distress.      Breath sounds: Normal breath sounds.   Abdominal:      General: Bowel sounds are normal.      Palpations: Abdomen is soft. There is no mass.      Tenderness: There is no abdominal tenderness.    Musculoskeletal:         General: No swelling. Normal range of motion.      Cervical back: Normal range of motion and neck supple.   Lymphadenopathy:      Cervical: No cervical adenopathy.   Skin:     General: Skin is warm and dry.      Findings: No lesion or rash.   Neurological:      Mental Status: She is alert and oriented to person, place, and time.      Cranial Nerves: No cranial nerve deficit.      Sensory: No sensory deficit.      Motor: No weakness.      Coordination: Coordination normal.      Gait: Gait normal.      Deep Tendon Reflexes: Reflexes are normal and symmetric.   Psychiatric:         Mood and Affect: Mood normal.         Behavior: Behavior normal.         Thought Content: Thought content normal.         Judgment: Judgment normal.            No results were obtained or interpreted today.      Assessment & Plan     Problems Addressed this Visit          Cardiac and Vasculature    Hyperlipidemia    Relevant Orders    Comprehensive Metabolic Panel    Lipid Panel       Neuro    History of CVA (cerebrovascular accident)    Relevant Orders    Comprehensive Metabolic Panel    TSH    T4, Free     Other Visit Diagnoses       Annual physical exam    -  Primary    Relevant Orders    CBC (No Diff)    Comprehensive Metabolic Panel    Lipid Panel    TSH    T4, Free    Medicare annual wellness visit, subsequent        Fatigue, unspecified type        Relevant Orders    CBC (No Diff)    Comprehensive Metabolic Panel    TSH    T4, Free    Atrial fibrillation, unspecified type        Relevant Orders    Comprehensive Metabolic Panel    TSH    T4, Free    Colon cancer screening        Relevant Orders    Cologuard - Stool, Per Rectum    Need for vaccination        Relevant Orders    Fluzone High-Dose 65+yrs (5097-5690) (Completed)    Pneumococcal Conjugate Vaccine 20-Valent (PCV20) (Completed)          Diagnoses         Codes Comments    Annual physical exam    -  Primary ICD-10-CM: Z00.00  ICD-9-CM: V70.0      Medicare annual wellness visit, subsequent     ICD-10-CM: Z00.00  ICD-9-CM: V70.0     Mixed hyperlipidemia     ICD-10-CM: E78.2  ICD-9-CM: 272.2     Fatigue, unspecified type     ICD-10-CM: R53.83  ICD-9-CM: 780.79     History of CVA (cerebrovascular accident)     ICD-10-CM: Z86.73  ICD-9-CM: V12.54     Atrial fibrillation, unspecified type     ICD-10-CM: I48.91  ICD-9-CM: 427.31     Colon cancer screening     ICD-10-CM: Z12.11  ICD-9-CM: V76.51     Need for vaccination     ICD-10-CM: Z23  ICD-9-CM: V05.9              Preventive medicine discussed, diet, exercise, healthy living discussed at length.  Discussed nutrition, physical activity, healthy weight, injury prevention, misuse of tobacco, alcohol and drugs, dental health, mental health, immunizations, screening    Part of this note may be an electronic transcription/translation of spoken language to printed text using the Dragon Dictation System.       Transcribed from ambient dictation for ARGENTINA English MD by Trudi Worley.  10/24/23   09:50 EDT    Patient or patient representative verbalized consent to the visit recording.  I have personally performed the services described in this document as transcribed by the above individual, and it is both accurate and complete.

## 2023-10-24 NOTE — PROGRESS NOTES
The ABCs of the Annual Wellness Visit  Subsequent Medicare Wellness Visit    Chief Complaint   Patient presents with    Medicare Wellness-subsequent     OK WITH STUDENT DURING VISIT      Subjective   History of Present Illness:  Bushra Vaz is a 78 y.o. female who presents for a Subsequent Medicare Wellness Visit.    The following portions of the patient's history were reviewed and   updated as appropriate: allergies, current medications, past family history, past medical history, past social history, past surgical history, and problem list.    Compared to one year ago, the patient feels her physical   health is the same.    Compared to one year ago, the patient feels her mental   health is the same.    Recent Hospitalizations:  She was not admitted to the hospital during the last year.       Current Medical Providers:  Patient Care Team:  Noah English MD as PCP - General (Family Medicine)  [facility], BENJI Cameron as Nurse Practitioner (Obstetrics and Gynecology)    Outpatient Medications Prior to Visit   Medication Sig Dispense Refill    cephalexin (Keflex) 500 MG capsule Take 1 capsule by mouth 3 (Three) Times a Day. (Patient not taking: Reported on 10/24/2023) 21 capsule 0    estradiol (ESTRACE VAGINAL) 0.1 MG/GM vaginal cream Apply a small amount externally nightly (Patient not taking: Reported on 10/24/2023) 42.5 g 11    nitrofurantoin, macrocrystal-monohydrate, (Macrobid) 100 MG capsule Take 1 capsule by mouth 2 (Two) Times a Day. (Patient not taking: Reported on 10/24/2023) 14 capsule 0     No facility-administered medications prior to visit.       No opioid medication identified on active medication list. I have reviewed chart for other potential  high risk medication/s and harmful drug interactions in the elderly.        Aspirin is not on active medication list.  Aspirin use is not indicated based on review of current medical condition/s. Risk of harm outweighs potential benefits.   ".    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      Review of Systems      Objective      Vitals:    10/24/23 0816   BP: 142/82   Pulse: 104   Resp: 14   Temp: 96.5 °F (35.8 °C)   SpO2: 97%   Weight: 92 kg (202 lb 12.8 oz)   Height: 165.1 cm (65\")   PainSc: 0-No pain     BMI Readings from Last 1 Encounters:   10/24/23 33.75 kg/m²   BMI is above normal parameters. Recommendations include: nutrition counseling    Does the patient have evidence of cognitive impairment? No    Physical Exam            HEALTH RISK ASSESSMENT    Smoking Status:  Social History     Tobacco Use   Smoking Status Former    Packs/day: 0.20    Years: 15.00    Additional pack years: 0.00    Total pack years: 3.00    Types: Cigarettes    Quit date:     Years since quittin.8   Smokeless Tobacco Never   Tobacco Comments    smoked socially as young adult     Alcohol Consumption:  Social History     Substance and Sexual Activity   Alcohol Use Yes    Comment: 2-3 drinks weekly     Fall Risk Screen:    STEADI Fall Risk Assessment was completed, and patient is at LOW risk for falls.Assessment completed on:10/24/2023    Depression Screening:      10/24/2023     8:25 AM   PHQ-2/PHQ-9 Depression Screening   Little Interest or Pleasure in Doing Things 0-->not at all   Feeling Down, Depressed or Hopeless 0-->not at all   PHQ-9: Brief Depression Severity Measure Score 0       Health Habits and Functional and Cognitive Screening:      10/4/2021     9:07 AM   Functional & Cognitive Status   Do " you have difficulty preparing food and eating? No   Do you have difficulty bathing yourself, getting dressed or grooming yourself? No   Do you have difficulty using the toilet? No   Do you have difficulty moving around from place to place? No   Do you have trouble with steps or getting out of a bed or a chair? No   Current Diet Well Balanced Diet   Dental Exam Up to date   Eye Exam Up to date   Exercise (times per week) 0 times per week   Do you need help using the phone?  No   Are you deaf or do you have serious difficulty hearing?  No   Do you need help to go to places out of walking distance? No   Do you need help shopping? No   Do you need help preparing meals?  No   Do you need help with housework?  No   Do you need help with laundry? No   Do you need help taking your medications? No   Do you need help managing money? No   Do you ever drive or ride in a car without wearing a seat belt? No   Have you felt unusual stress, anger or loneliness in the last month? No   Who do you live with? Spouse   If you need help, do you have trouble finding someone available to you? No   Have you been bothered in the last four weeks by sexual problems? No   Do you have difficulty concentrating, remembering or making decisions? No       Age-appropriate Screening Schedule:  Refer to the list below for future screening recommendations based on patient's age, sex and/or medical conditions. Orders for these recommended tests are listed in the plan section. The patient has been provided with a written plan.    Health Maintenance   Topic Date Due    TDAP/TD VACCINES (1 - Tdap) Never done    DXA SCAN  11/18/2022    COLORECTAL CANCER SCREENING  04/11/2023    LIPID PANEL  05/17/2023    ZOSTER VACCINE (2 of 2) 10/24/2023 (Originally 4/18/2018)    COVID-19 Vaccine (6 - 2023-24 season) 01/13/2024 (Originally 9/1/2023)    BMI FOLLOWUP  11/11/2023    ANNUAL WELLNESS VISIT  10/24/2024    INFLUENZA VACCINE  Completed    Pneumococcal Vaccine 65+   Completed              Assessment & Plan     CMS Preventative Services Quick Reference  Risk Factors Identified During Encounter  Immunizations Discussed/Encouraged: Prevnar 20 (Pneumococcal 20-valent conjugate)  The above risks/problems have been discussed with the patient.  Follow up actions/plans if indicated are seen below in the Assessment/Plan Section.  Pertinent information has been shared with the patient in the After Visit Summary.    Diagnoses and all orders for this visit:    1. Annual physical exam (Primary)  -     CBC (No Diff); Future  -     Comprehensive Metabolic Panel; Future  -     Lipid Panel; Future  -     TSH; Future  -     T4, Free; Future    2. Medicare annual wellness visit, subsequent    3. Mixed hyperlipidemia  -     Comprehensive Metabolic Panel; Future  -     Lipid Panel; Future    4. Fatigue, unspecified type  -     CBC (No Diff); Future  -     Comprehensive Metabolic Panel; Future  -     TSH; Future  -     T4, Free; Future    5. History of CVA (cerebrovascular accident)  -     Comprehensive Metabolic Panel; Future  -     TSH; Future  -     T4, Free; Future    6. Atrial fibrillation, unspecified type  -     Comprehensive Metabolic Panel; Future  -     TSH; Future  -     T4, Free; Future    7. Colon cancer screening  -     Cologuard - Stool, Per Rectum; Future    8. Need for vaccination  -     Fluzone High-Dose 65+yrs (6400-1278)  -     Pneumococcal Conjugate Vaccine 20-Valent (PCV20)        Follow Up:  Return in about 1 year (around 10/24/2024) for Medicare Wellness, Recheck, Annual.     An After Visit Summary and PPPS were given to the patient.

## 2023-10-30 ENCOUNTER — LAB (OUTPATIENT)
Dept: LAB | Facility: HOSPITAL | Age: 78
End: 2023-10-30
Payer: MEDICARE

## 2023-10-30 DIAGNOSIS — Z86.73 HISTORY OF CVA (CEREBROVASCULAR ACCIDENT): ICD-10-CM

## 2023-10-30 DIAGNOSIS — I48.91 ATRIAL FIBRILLATION, UNSPECIFIED TYPE: ICD-10-CM

## 2023-10-30 DIAGNOSIS — R53.83 FATIGUE, UNSPECIFIED TYPE: ICD-10-CM

## 2023-10-30 DIAGNOSIS — E78.2 MIXED HYPERLIPIDEMIA: ICD-10-CM

## 2023-10-30 DIAGNOSIS — Z00.00 ANNUAL PHYSICAL EXAM: ICD-10-CM

## 2023-10-30 LAB
ALBUMIN SERPL-MCNC: 4.1 G/DL (ref 3.5–5.2)
ALBUMIN/GLOB SERPL: 1.3 G/DL
ALP SERPL-CCNC: 145 U/L (ref 39–117)
ALT SERPL W P-5'-P-CCNC: 10 U/L (ref 1–33)
ANION GAP SERPL CALCULATED.3IONS-SCNC: 11.6 MMOL/L (ref 5–15)
AST SERPL-CCNC: 22 U/L (ref 1–32)
BILIRUB SERPL-MCNC: 0.6 MG/DL (ref 0–1.2)
BUN SERPL-MCNC: 16 MG/DL (ref 8–23)
BUN/CREAT SERPL: 19.5 (ref 7–25)
CALCIUM SPEC-SCNC: 9.3 MG/DL (ref 8.6–10.5)
CHLORIDE SERPL-SCNC: 106 MMOL/L (ref 98–107)
CHOLEST SERPL-MCNC: 239 MG/DL (ref 0–200)
CO2 SERPL-SCNC: 23.4 MMOL/L (ref 22–29)
CREAT SERPL-MCNC: 0.82 MG/DL (ref 0.57–1)
DEPRECATED RDW RBC AUTO: 40.3 FL (ref 37–54)
EGFRCR SERPLBLD CKD-EPI 2021: 72.4 ML/MIN/1.73
ERYTHROCYTE [DISTWIDTH] IN BLOOD BY AUTOMATED COUNT: 12.1 % (ref 12.3–15.4)
GLOBULIN UR ELPH-MCNC: 3.2 GM/DL
GLUCOSE SERPL-MCNC: 89 MG/DL (ref 65–99)
HCT VFR BLD AUTO: 41.4 % (ref 34–46.6)
HDLC SERPL-MCNC: 56 MG/DL (ref 40–60)
HGB BLD-MCNC: 14.3 G/DL (ref 12–15.9)
LDLC SERPL CALC-MCNC: 166 MG/DL (ref 0–100)
LDLC/HDLC SERPL: 2.91 {RATIO}
MCH RBC QN AUTO: 31.5 PG (ref 26.6–33)
MCHC RBC AUTO-ENTMCNC: 34.5 G/DL (ref 31.5–35.7)
MCV RBC AUTO: 91.2 FL (ref 79–97)
PLATELET # BLD AUTO: 262 10*3/MM3 (ref 140–450)
PMV BLD AUTO: 10.3 FL (ref 6–12)
POTASSIUM SERPL-SCNC: 4.3 MMOL/L (ref 3.5–5.2)
PROT SERPL-MCNC: 7.3 G/DL (ref 6–8.5)
RBC # BLD AUTO: 4.54 10*6/MM3 (ref 3.77–5.28)
SODIUM SERPL-SCNC: 141 MMOL/L (ref 136–145)
T4 FREE SERPL-MCNC: 1.15 NG/DL (ref 0.93–1.7)
TRIGL SERPL-MCNC: 99 MG/DL (ref 0–150)
TSH SERPL DL<=0.05 MIU/L-ACNC: 1.23 UIU/ML (ref 0.27–4.2)
VLDLC SERPL-MCNC: 17 MG/DL (ref 5–40)
WBC NRBC COR # BLD: 5.17 10*3/MM3 (ref 3.4–10.8)

## 2023-10-30 PROCEDURE — 84439 ASSAY OF FREE THYROXINE: CPT

## 2023-10-30 PROCEDURE — 85027 COMPLETE CBC AUTOMATED: CPT

## 2023-10-30 PROCEDURE — 80061 LIPID PANEL: CPT

## 2023-10-30 PROCEDURE — 36415 COLL VENOUS BLD VENIPUNCTURE: CPT

## 2023-10-30 PROCEDURE — 80053 COMPREHEN METABOLIC PANEL: CPT

## 2023-10-30 PROCEDURE — 84443 ASSAY THYROID STIM HORMONE: CPT

## 2023-11-13 ENCOUNTER — TELEPHONE (OUTPATIENT)
Dept: FAMILY MEDICINE CLINIC | Facility: CLINIC | Age: 78
End: 2023-11-13

## 2023-11-13 NOTE — TELEPHONE ENCOUNTER
Caller: Bushra Vaz    Relationship: Self    Best call back number: [phone]    What form or medical record are you requesting: COPY OF LABS AND COLOGURAD    Who is requesting this form or medical record from you: PATIENT      How would you like to receive the form or medical records (pick-up, mail, fax): MAIL    If mail, what is the address:     [address] [place] [id number]      Timeframe paperwork needed: ASAP    Additional notes:

## 2023-11-15 ENCOUNTER — TELEPHONE (OUTPATIENT)
Dept: FAMILY MEDICINE CLINIC | Facility: CLINIC | Age: 78
End: 2023-11-15

## 2023-11-15 NOTE — TELEPHONE ENCOUNTER
Caller: Lester Vaz    Relationship: Emergency Contact    Best call back number:      What is the medical concern/diagnosis: COLONOSCOPY    What specialty or service is being requested: GI    Any additional details: PATIENT HASN'T HEARD ANYTHING REGARDING THE REFERRAL; PLEASE CALL TO ADVISE

## 2023-11-15 NOTE — TELEPHONE ENCOUNTER
Caller: Lester Vaz    Relationship: Emergency Contact    Best call back number:      What form or medical record are you requesting: RECENT LABWORK    Who is requesting this form or medical record from you: PATIENT    How would you like to receive the form or medical records (pick-up, mail, fax):    If mail, what is the address:  [address] [place] [phone]        Timeframe paperwork needed: SHE JUST WANTS TO REVIEW THE RESULTS

## 2024-01-16 ENCOUNTER — TELEPHONE (OUTPATIENT)
Dept: FAMILY MEDICINE CLINIC | Facility: CLINIC | Age: 79
End: 2024-01-16

## 2024-01-16 RX ORDER — AZITHROMYCIN 250 MG/1
TABLET, FILM COATED ORAL
Qty: 6 TABLET | Refills: 0 | Status: SHIPPED | OUTPATIENT
Start: 2024-01-16

## 2024-01-29 ENCOUNTER — TELEPHONE (OUTPATIENT)
Dept: FAMILY MEDICINE CLINIC | Facility: CLINIC | Age: 79
End: 2024-01-29

## 2024-01-29 NOTE — TELEPHONE ENCOUNTER
Caller: Bushra Vaz    Relationship: Self    Best call back number: [phone]     What is the best time to reach you: ANYTIME - CAN LEAVE A DETAILED VOICE MESSAGE    Who are you requesting to speak with (clinical staff, provider,  specific staff member): CLINICAL     Do you know the name of the person who called: THE PATIENT BUSHRA    What was the call regarding: THE PATIENT BUSHRA WANTS TO KNOW WHAT HER UPCOMING FOLLOW UP ON 02/04/2024 IS FOR, STATING SHE HAD HER YEARLY VISIT 10/2023, AND IS UNSURE THAT THIS APPOINTMENT IS FOR. PLEASE CALL AND ADVISE     Is it okay if the provider responds through [employer]: NO

## 2024-02-26 RX ORDER — AZITHROMYCIN 250 MG/1
TABLET, FILM COATED ORAL
Qty: 6 TABLET | Refills: 0 | Status: SHIPPED | OUTPATIENT
Start: 2024-02-26

## 2024-02-26 NOTE — TELEPHONE ENCOUNTER
Caller: Bushra Vaz AUGIE    Relationship: Self    Best call back number:[phone]     Requested Prescriptions:   Requested Prescriptions     Pending Prescriptions Disp Refills    azithromycin (Zithromax Z-Martin) 250 MG tablet 6 tablet 0     Sig: Take 2 tablets the first day, then 1 tablet daily for 4 days.        Pharmacy where request should be sent: [address] [phone] [facility] [phone]      Last office visit with prescribing clinician: 10/24/2023   Last telemedicine visit with prescribing clinician: Visit date not found   Next office visit with prescribing clinician: 10/25/2024     Additional details provided by patient: GREEN MUCUS SINCE LAST THURSDAY. WANTS A SECOND ROUND OF THIS CALLED IN     Does the patient have less than a 3 day supply:  [x] Yes  [] No    Would you like a call back once the refill request has been completed: [] Yes [x] No    If the office needs to give you a call back, can they leave a voicemail: [] Yes [x] No    Rebecca Hernandez Rep   02/26/24 09:07 EST

## 2024-03-04 ENCOUNTER — TELEPHONE (OUTPATIENT)
Dept: FAMILY MEDICINE CLINIC | Facility: CLINIC | Age: 79
End: 2024-03-04

## 2024-03-04 RX ORDER — CEFDINIR 300 MG/1
300 CAPSULE ORAL 2 TIMES DAILY
Qty: 20 CAPSULE | Refills: 0 | Status: SHIPPED | OUTPATIENT
Start: 2024-03-04

## 2024-03-04 NOTE — TELEPHONE ENCOUNTER
Caller: Bushra Vaz    Relationship: Self    Best call back number: [phone]     [address] [facility] [phone] [facility] [phone] FX     Last office visit with prescribing clinician: 10/24/2023   Last telemedicine visit with prescribing clinician: Visit date not found   Next office visit with prescribing clinician: 10/25/2024     Additional details provided by patient: STILL HAS THE CRUD, THE 2 SCRIPTS HAVEN'T HELPED, NOT GETTING BETTER.    Daryl Liz, Rebecca Rep   03/04/24 09:35 EST

## 2024-03-15 ENCOUNTER — TRANSCRIBE ORDERS (OUTPATIENT)
Dept: ADMINISTRATIVE | Facility: HOSPITAL | Age: 79
End: 2024-03-15
Payer: MEDICARE

## 2024-03-15 DIAGNOSIS — Z12.31 SCREENING MAMMOGRAM FOR BREAST CANCER: Primary | ICD-10-CM

## 2024-03-28 ENCOUNTER — OFFICE VISIT (OUTPATIENT)
Dept: OBSTETRICS AND GYNECOLOGY | Facility: CLINIC | Age: 79
End: 2024-03-28
Payer: MEDICARE

## 2024-03-28 VITALS
DIASTOLIC BLOOD PRESSURE: 88 MMHG | HEIGHT: 65 IN | BODY MASS INDEX: 33.39 KG/M2 | SYSTOLIC BLOOD PRESSURE: 130 MMHG | WEIGHT: 200.4 LBS

## 2024-03-28 DIAGNOSIS — N95.2 ATROPHY OF VAGINA: ICD-10-CM

## 2024-03-28 DIAGNOSIS — Z01.411 ENCOUNTER FOR GYNECOLOGICAL EXAMINATION WITH ABNORMAL FINDING: Primary | ICD-10-CM

## 2024-03-28 PROCEDURE — 1159F MED LIST DOCD IN RCRD: CPT | Performed by: NURSE PRACTITIONER

## 2024-03-28 PROCEDURE — G0101 CA SCREEN;PELVIC/BREAST EXAM: HCPCS | Performed by: NURSE PRACTITIONER

## 2024-03-28 PROCEDURE — 1160F RVW MEDS BY RX/DR IN RCRD: CPT | Performed by: NURSE PRACTITIONER

## 2024-04-10 NOTE — PROGRESS NOTES
Chief Complaint  Bushra Vaz is a 78 y.o.  female presenting for Gynecologic Exam (Patient states she has ongoing symptoms of UTI.  Has seen urology.  )    History of Present Illness  Bushra is an 79yo nulligravid woman, here for gyn exam.  Her past surgical history includes, in part, TRINA/BSO in  (for endometrial cancer).  She has PMHx osteopenia.  She declines any further BMD testing, as she would decline bisphosphonates.  She is intentional re: calcium and vitamin D intake, walking and exercise.  She has ongoing symptoms of dysuria, and is seeing urology.    Last mammogram 23, neg  Last Cologuard 10/2023, neg  Last DEXA scan 2020, osteopenia with nl FRAX scores.    The following portions of the patient's history were reviewed and updated as appropriate: allergies, current medications, past family history, past medical history, past social history, past surgical history, and problem list.    Allergies   Allergen Reactions    Atorvastatin      Joint pains    Pravastatin Sodium      Ankle pain       No current outpatient medications on file.    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      Osteoarthritis     Osteopenia 2020    FRAX risk 15% / 4.6%    Vitamin B12 deficiency     Vitamin D deficiency         Past Surgical History:   Procedure Laterality Date    APPENDECTOMY  2010    during hysterectomy    BREAST EXCISIONAL BIOPSY Right     CATARACT EXTRACTION, BILATERAL      COLONOSCOPY  2018    COLONOSCOPY W/ POLYPECTOMY      HIP TROCHANTERIC NAILING WITH INTRAMEDULLARY HIP SCREW Left 2022    Procedure: HIP TROCHANTERIC NAILING LEFT;  Surgeon: Satya Contreras MD;  Location: [facility];  Service:  "Orthopedics;  Laterality: Left;    REPLACEMENT TOTAL KNEE BILATERAL Bilateral 2012    TOTAL ABDOMINAL HYSTERECTOMY WITH SALPINGO OOPHORECTOMY Bilateral 2010    Stage IIIa adeno squamous carcinoma       Objective  /88   Ht 165.1 cm (65\")   Wt 90.9 kg (200 lb 6.4 oz)   Breastfeeding No   BMI 33.35 kg/m²     Physical Exam  Vitals and nursing note reviewed. Exam conducted with a chaperone present.   Constitutional:       General: She is not in acute distress.     Appearance: Normal appearance. She is not ill-appearing.   HENT:      Head: Normocephalic.   Neck:      Thyroid: No thyroid mass or thyromegaly.   Cardiovascular:      Rate and Rhythm: Normal rate and regular rhythm.      Heart sounds: Normal heart sounds. No murmur heard.  Pulmonary:      Effort: Pulmonary effort is normal. No respiratory distress.      Breath sounds: Normal breath sounds.   Chest:   Breasts:     Right: No inverted nipple, mass or nipple discharge.      Left: No inverted nipple, mass or nipple discharge.   Abdominal:      Palpations: Abdomen is soft. There is no mass.      Tenderness: There is no abdominal tenderness.   Genitourinary:     General: Normal vulva.      Labia:         Right: No rash, tenderness or lesion.         Left: No rash, tenderness or lesion.       Vagina: Erythema present. No vaginal discharge.      Uterus: Absent.       Adnexa:         Right: No mass or tenderness.          Left: No mass or tenderness.        Comments: Mod atrophy with atrophic erythema.  No abnormal discharge.  Anus appears wnl.  No rectal exam performed.  Lymphadenopathy:      Upper Body:      Right upper body: No supraclavicular or axillary adenopathy.      Left upper body: No supraclavicular or axillary adenopathy.   Skin:     General: Skin is warm and dry.   Neurological:      Mental Status: She is alert and oriented to person, place, and time.   Psychiatric:         Mood and Affect: Mood normal.         Behavior: Behavior normal. "         Assessment/Plan   Diagnoses and all orders for this visit:    1. Encounter for gynecological examination with abnormal finding (Primary)    2. Atrophy of vagina    She declines medication for the atrophy.      Procedures    40 to 64: Counseling/Anticipatory Guidance Discussed: nutrition, physical activity, screenings, and self-breast exam    Return in about 1 year (around 3/28/2025) for Annual physical.    Tasneem Duong, APRN  03/28/2024

## 2024-05-30 ENCOUNTER — HOSPITAL ENCOUNTER (OUTPATIENT)
Dept: MAMMOGRAPHY | Facility: HOSPITAL | Age: 79
Discharge: HOME | End: 2024-05-30
Admitting: NURSE PRACTITIONER
Payer: MEDICARE

## 2024-05-30 DIAGNOSIS — Z12.31 SCREENING MAMMOGRAM FOR BREAST CANCER: ICD-10-CM

## 2024-05-30 PROCEDURE — 77063 BREAST TOMOSYNTHESIS BI: CPT

## 2024-05-30 PROCEDURE — 77067 SCR MAMMO BI INCL CAD: CPT

## 2024-10-25 ENCOUNTER — OFFICE VISIT (OUTPATIENT)
Dept: FAMILY MEDICINE CLINIC | Facility: CLINIC | Age: 79
End: 2024-10-25
Payer: MEDICARE

## 2024-10-25 VITALS
RESPIRATION RATE: 16 BRPM | SYSTOLIC BLOOD PRESSURE: 120 MMHG | HEIGHT: 65 IN | HEART RATE: 69 BPM | TEMPERATURE: 97.5 F | BODY MASS INDEX: 33.35 KG/M2 | DIASTOLIC BLOOD PRESSURE: 74 MMHG

## 2024-10-25 DIAGNOSIS — C54.1 MALIGNANT NEOPLASM OF ENDOMETRIUM: ICD-10-CM

## 2024-10-25 DIAGNOSIS — Z23 NEED FOR VACCINATION: ICD-10-CM

## 2024-10-25 DIAGNOSIS — E78.2 MIXED HYPERLIPIDEMIA: ICD-10-CM

## 2024-10-25 DIAGNOSIS — Z00.00 MEDICARE ANNUAL WELLNESS VISIT, SUBSEQUENT: ICD-10-CM

## 2024-10-25 DIAGNOSIS — I48.91 ATRIAL FIBRILLATION, UNSPECIFIED TYPE: ICD-10-CM

## 2024-10-25 DIAGNOSIS — Z00.00 ANNUAL PHYSICAL EXAM: Primary | ICD-10-CM

## 2024-10-25 DIAGNOSIS — Z86.73 HISTORY OF CVA (CEREBROVASCULAR ACCIDENT): ICD-10-CM

## 2024-10-25 PROCEDURE — 1160F RVW MEDS BY RX/DR IN RCRD: CPT | Performed by: FAMILY MEDICINE

## 2024-10-25 PROCEDURE — 90662 IIV NO PRSV INCREASED AG IM: CPT | Performed by: FAMILY MEDICINE

## 2024-10-25 PROCEDURE — 1170F FXNL STATUS ASSESSED: CPT | Performed by: FAMILY MEDICINE

## 2024-10-25 PROCEDURE — 1159F MED LIST DOCD IN RCRD: CPT | Performed by: FAMILY MEDICINE

## 2024-10-25 PROCEDURE — 1126F AMNT PAIN NOTED NONE PRSNT: CPT | Performed by: FAMILY MEDICINE

## 2024-10-25 PROCEDURE — G0008 ADMIN INFLUENZA VIRUS VAC: HCPCS | Performed by: FAMILY MEDICINE

## 2024-10-25 PROCEDURE — 99397 PER PM REEVAL EST PAT 65+ YR: CPT | Performed by: FAMILY MEDICINE

## 2024-10-25 PROCEDURE — G0439 PPPS, SUBSEQ VISIT: HCPCS | Performed by: FAMILY MEDICINE

## 2024-10-25 NOTE — PROGRESS NOTES
The ABCs of the Annual Wellness Visit  Subsequent Medicare Wellness Visit    Chief Complaint   Patient presents with    Medicare Wellness-subsequent      Subjective   History of Present Illness:  Bushra Vaz is a 79 y.o. female who presents for a Subsequent Medicare Wellness Visit.  History of Present Illness      The following portions of the patient's history were reviewed and   updated as appropriate: allergies, current medications, past family history, past medical history, past social history, past surgical history, and problem list.    Compared to one year ago, the patient feels her physical   health is the same.    Compared to one year ago, the patient feels her mental   health is the same.    Recent Hospitalizations:  She was not admitted to the hospital during the last year.       Current Medical Providers:  Patient Care Team:  Noah English MD as PCP - General (Family Medicine)  Ad, BENJI Cameron as Nurse Practitioner (Obstetrics and Gynecology)    No outpatient medications prior to visit.     No facility-administered medications prior to visit.       No opioid medication identified on active medication list. I have reviewed chart for other potential  high risk medication/s and harmful drug interactions in the elderly.        Aspirin is not on active medication list.  Aspirin use is not indicated based on review of current medical condition/s. Risk of harm outweighs potential benefits.  .    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      Review of Systems      Objective      Vitals:    10/25/24 1115   BP: 120/74   Pulse: 69   Resp: 16   Temp: 97.5 °F (36.4 °C)   Height: 165.1 cm (65\")   PainSc: 0-No pain     BMI Readings from Last 1 Encounters:   10/25/24 33.35 kg/m²   BMI is within normal parameters. No follow-up required.  Body mass index is 33.35 kg/m².  BMI has not been calculated during today's encounter.     Does the patient have evidence of cognitive impairment? No    Physical Exam  Physical Exam         Results             HEALTH RISK ASSESSMENT    Smoking Status:  Social History     Tobacco Use   Smoking Status Former    Current packs/day: 0.00    Average packs/day: 0.2 packs/day for 15.0 years (3.0 ttl pk-yrs)    Types: Cigarettes    Start date:     Quit date:     Years since quittin.8   Smokeless Tobacco Never   Tobacco Comments    smoked socially as young adult     Alcohol Consumption:  Social History     Substance and Sexual Activity   Alcohol Use Yes    Comment: 2-3 drinks weekly     Fall Risk Screen:    ILIANA Fall Risk Assessment was completed, and patient is at LOW risk for falls.Assessment completed on:10/25/2024    Depression Screening:      10/25/2024    11:18 AM   PHQ-2/PHQ-9 Depression Screening   Little interest or pleasure in doing things Not at all   Feeling down, depressed, or hopeless Not at all       Health Habits and Functional and Cognitive Screening:      10/25/2024    11:18 AM   Functional & Cognitive Status   Do you have difficulty preparing food and eating? No   Do you have difficulty bathing yourself, getting dressed or grooming yourself? No   Do you have difficulty using the toilet? No   Do you have difficulty moving around from place to place? No   Do you have trouble with steps or getting out of a bed or a chair? No   Current Diet Well Balanced Diet   Dental Exam Up to date   Eye Exam Up to date   Exercise (times per week) 0 times per week   Current Exercises Include No Regular " Exercise   Do you need help using the phone?  No   Are you deaf or do you have serious difficulty hearing?  No   Do you need help to go to places out of walking distance? No   Do you need help shopping? No   Do you need help preparing meals?  No   Do you need help with housework?  No   Do you need help with laundry? No   Do you need help taking your medications? No   Do you need help managing money? No   Do you ever drive or ride in a car without wearing a seat belt? No   Have you felt unusual stress, anger or loneliness in the last month? No   Who do you live with? Spouse   If you need help, do you have trouble finding someone available to you? No   Have you been bothered in the last four weeks by sexual problems? No   Do you have difficulty concentrating, remembering or making decisions? No       Age-appropriate Screening Schedule:  Refer to the list below for future screening recommendations based on patient's age, sex and/or medical conditions. Orders for these recommended tests are listed in the plan section. The patient has been provided with a written plan.    Health Maintenance   Topic Date Due    TDAP/TD VACCINES (1 - Tdap) Never done    DXA SCAN  11/18/2022    COLORECTAL CANCER SCREENING  10/31/2023    INFLUENZA VACCINE  08/01/2024    BMI FOLLOWUP  10/24/2024    LIPID PANEL  10/30/2024    ANNUAL WELLNESS VISIT  10/25/2025    MAMMOGRAM  05/30/2026    COVID-19 Vaccine  Completed    RSV Vaccine - Adults  Completed    Pneumococcal Vaccine 65+  Completed    ZOSTER VACCINE  Addressed              Assessment & Plan     CMS Preventative Services Quick Reference  Risk Factors Identified During Encounter  Immunizations Discussed/Encouraged: Influenza  The above risks/problems have been discussed with the patient.  Follow up actions/plans if indicated are seen below in the Assessment/Plan Section.  Pertinent information has been shared with the patient in the After Visit Summary.    Diagnoses and all orders for this  visit:    1. Annual physical exam (Primary)    2. Medicare annual wellness visit, subsequent  -     CBC (No Diff); Future  -     Comprehensive Metabolic Panel; Future  -     Lipid Panel; Future  -     TSH; Future  -     T4, Free; Future    3. Mixed hyperlipidemia  -     Comprehensive Metabolic Panel; Future  -     Lipid Panel; Future    4. History of CVA (cerebrovascular accident)  -     Comprehensive Metabolic Panel; Future  -     Lipid Panel; Future    5. Atrial fibrillation, unspecified type  -     Comprehensive Metabolic Panel; Future  -     TSH; Future  -     T4, Free; Future    6. 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     An After Visit Summary and PPPS were given to the patient.

## 2024-10-25 NOTE — PROGRESS NOTES
Subjective   Bushra Vaz is a 79 y.o. female    Chief Complaint    Annual physical exam  Medicare wellness visit subsequent  Need flu vaccine  Hyperlipidemia  History of CVA  History of endometrial cancer  History of atrial fibrillation with rapid ventricular response    History of Present Illness  History of Present Illness  Patient presents today for her annual physical examination and Medicare wellness visit.  She is requesting a flu vaccine.  She has a history of endometrial cancer.  She has a history of a stroke without residual effects and also an episode of atrial fibrillation with rapid ventricular response that has been cleared as she is not on any anticoagulant therapy.    She has no acute complaints today.  She is not fasting for lab studies.    High-dose flu vaccine is given.      The following portions of the patient's history were reviewed and updated as appropriate: allergies, current medications, past social history and problem list    Review of Systems   Constitutional: Negative.  Negative for chills, fatigue and fever.   HENT: Negative.     Eyes: Negative.    Respiratory: Negative.  Negative for cough, chest tightness, shortness of breath and wheezing.    Cardiovascular: Negative.  Negative for chest pain, palpitations and leg swelling.   Gastrointestinal: Negative.  Negative for abdominal pain, nausea and vomiting.   Endocrine: Negative.  Negative for polydipsia, polyphagia and polyuria.   Genitourinary: Negative.  Negative for dysuria, frequency and urgency.   Musculoskeletal: Negative.  Negative for arthralgias, back pain and myalgias.   Skin: Negative.  Negative for color change and rash.   Allergic/Immunologic: Negative.    Neurological: Negative.  Negative for weakness and headaches.   Hematological: Negative.  Negative for adenopathy. Does not bruise/bleed easily.   Psychiatric/Behavioral: Negative.     All other systems reviewed and are negative.    Objective     Vitals:    10/25/24  1115   BP: 120/74   Pulse: 69   Resp: 16   Temp: 97.5 °F (36.4 °C)       Physical Exam  Vitals and nursing note reviewed.   Constitutional:       General: She is not in acute distress.     Appearance: Normal appearance. She is well-developed. She is not ill-appearing, toxic-appearing or diaphoretic.   HENT:      Head: Normocephalic and atraumatic.      Right Ear: External ear normal.      Left Ear: External ear normal.   Eyes:      Conjunctiva/sclera: Conjunctivae normal.      Pupils: Pupils are equal, round, and reactive to light.   Neck:      Thyroid: No thyromegaly.      Vascular: No carotid bruit or JVD.   Cardiovascular:      Rate and Rhythm: Normal rate and regular rhythm.      Pulses: Normal pulses.      Heart sounds: Normal heart sounds. No murmur heard.  Pulmonary:      Effort: Pulmonary effort is normal. No respiratory distress.      Breath sounds: Normal breath sounds.   Abdominal:      General: Bowel sounds are normal.      Palpations: Abdomen is soft. There is no mass.      Tenderness: There is no abdominal tenderness.   Musculoskeletal:         General: No swelling. Normal range of motion.      Cervical back: Normal range of motion and neck supple.      Right lower leg: No edema.      Left lower leg: No edema.   Lymphadenopathy:      Cervical: No cervical adenopathy.   Skin:     General: Skin is warm and dry.      Findings: No lesion or rash.   Neurological:      Mental Status: She is alert and oriented to person, place, and time.      Cranial Nerves: No cranial nerve deficit.      Sensory: No sensory deficit.      Motor: No weakness.      Coordination: Coordination normal.      Gait: Gait normal.      Deep Tendon Reflexes: Reflexes are normal and symmetric.   Psychiatric:         Mood and Affect: Mood normal.         Behavior: Behavior normal.         Thought Content: Thought content normal.         Judgment: Judgment normal.   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       Neuro    History of CVA (cerebrovascular accident)    Relevant Orders    Comprehensive Metabolic Panel    Lipid Panel     Other Visit Diagnoses       Annual physical exam    -  Primary    Medicare annual wellness visit, subsequent        Relevant Orders    CBC (No Diff)    Comprehensive Metabolic Panel    Lipid Panel    TSH    T4, Free    Atrial fibrillation, unspecified type        Relevant Orders    Comprehensive Metabolic Panel    TSH    T4, Free          Diagnoses         Codes Comments    Annual physical exam    -  Primary ICD-10-CM: Z00.00  ICD-9-CM: V70.0     Medicare annual wellness visit, subsequent     ICD-10-CM: Z00.00  ICD-9-CM: V70.0     Mixed hyperlipidemia     ICD-10-CM: E78.2  ICD-9-CM: 272.2     History of CVA (cerebrovascular accident)     ICD-10-CM: Z86.73  ICD-9-CM: V12.54     Atrial fibrillation, unspecified type     ICD-10-CM: I48.91  ICD-9-CM: 427.31     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

## 2024-11-01 ENCOUNTER — LAB (OUTPATIENT)
Dept: LAB | Facility: HOSPITAL | Age: 79
End: 2024-11-01
Payer: MEDICARE

## 2024-11-01 DIAGNOSIS — I48.91 ATRIAL FIBRILLATION, UNSPECIFIED TYPE: ICD-10-CM

## 2024-11-01 DIAGNOSIS — Z86.73 HISTORY OF CVA (CEREBROVASCULAR ACCIDENT): ICD-10-CM

## 2024-11-01 DIAGNOSIS — Z00.00 MEDICARE ANNUAL WELLNESS VISIT, SUBSEQUENT: ICD-10-CM

## 2024-11-01 DIAGNOSIS — E78.2 MIXED HYPERLIPIDEMIA: ICD-10-CM

## 2024-11-01 LAB
ALBUMIN SERPL-MCNC: 3.6 G/DL (ref 3.5–5.2)
ALBUMIN/GLOB SERPL: 1 G/DL
ALP SERPL-CCNC: 123 U/L (ref 39–117)
ALT SERPL W P-5'-P-CCNC: 10 U/L (ref 1–33)
ANION GAP SERPL CALCULATED.3IONS-SCNC: 12.5 MMOL/L (ref 5–15)
AST SERPL-CCNC: 21 U/L (ref 1–32)
BILIRUB SERPL-MCNC: 0.7 MG/DL (ref 0–1.2)
BUN SERPL-MCNC: 15 MG/DL (ref 8–23)
BUN/CREAT SERPL: 18.3 (ref 7–25)
CALCIUM SPEC-SCNC: 9.3 MG/DL (ref 8.6–10.5)
CHLORIDE SERPL-SCNC: 106 MMOL/L (ref 98–107)
CHOLEST SERPL-MCNC: 244 MG/DL (ref 0–200)
CO2 SERPL-SCNC: 24.5 MMOL/L (ref 22–29)
CREAT SERPL-MCNC: 0.82 MG/DL (ref 0.57–1)
DEPRECATED RDW RBC AUTO: 39.3 FL (ref 37–54)
EGFRCR SERPLBLD CKD-EPI 2021: 72 ML/MIN/1.73
ERYTHROCYTE [DISTWIDTH] IN BLOOD BY AUTOMATED COUNT: 11.8 % (ref 12.3–15.4)
GLOBULIN UR ELPH-MCNC: 3.7 GM/DL
GLUCOSE SERPL-MCNC: 84 MG/DL (ref 65–99)
HCT VFR BLD AUTO: 40.6 % (ref 34–46.6)
HDLC SERPL-MCNC: 56 MG/DL (ref 40–60)
HGB BLD-MCNC: 13.9 G/DL (ref 12–15.9)
LDLC SERPL CALC-MCNC: 169 MG/DL (ref 0–100)
LDLC/HDLC SERPL: 2.98 {RATIO}
MCH RBC QN AUTO: 31.5 PG (ref 26.6–33)
MCHC RBC AUTO-ENTMCNC: 34.2 G/DL (ref 31.5–35.7)
MCV RBC AUTO: 92.1 FL (ref 79–97)
PLATELET # BLD AUTO: 260 10*3/MM3 (ref 140–450)
PMV BLD AUTO: 10.1 FL (ref 6–12)
POTASSIUM SERPL-SCNC: 4.3 MMOL/L (ref 3.5–5.2)
PROT SERPL-MCNC: 7.3 G/DL (ref 6–8.5)
RBC # BLD AUTO: 4.41 10*6/MM3 (ref 3.77–5.28)
SODIUM SERPL-SCNC: 143 MMOL/L (ref 136–145)
T4 FREE SERPL-MCNC: 1.32 NG/DL (ref 0.92–1.68)
TRIGL SERPL-MCNC: 106 MG/DL (ref 0–150)
TSH SERPL DL<=0.05 MIU/L-ACNC: 1.11 UIU/ML (ref 0.27–4.2)
VLDLC SERPL-MCNC: 19 MG/DL (ref 5–40)
WBC NRBC COR # BLD AUTO: 6.96 10*3/MM3 (ref 3.4–10.8)

## 2024-11-01 PROCEDURE — 80061 LIPID PANEL: CPT

## 2024-11-01 PROCEDURE — 84443 ASSAY THYROID STIM HORMONE: CPT

## 2024-11-01 PROCEDURE — 36415 COLL VENOUS BLD VENIPUNCTURE: CPT

## 2024-11-01 PROCEDURE — 84439 ASSAY OF FREE THYROXINE: CPT

## 2024-11-01 PROCEDURE — 80053 COMPREHEN METABOLIC PANEL: CPT

## 2024-11-01 PROCEDURE — 85027 COMPLETE CBC AUTOMATED: CPT

## 2024-11-13 ENCOUNTER — TELEPHONE (OUTPATIENT)
Dept: FAMILY MEDICINE CLINIC | Facility: CLINIC | Age: 79
End: 2024-11-13

## 2024-11-13 RX ORDER — AZITHROMYCIN 250 MG/1
TABLET, FILM COATED ORAL
Qty: 6 TABLET | Refills: 0 | Status: SHIPPED | OUTPATIENT
Start: 2024-11-13

## 2024-11-13 RX ORDER — BROMPHENIRAMINE MALEATE, PSEUDOEPHEDRINE HYDROCHLORIDE, AND DEXTROMETHORPHAN HYDROBROMIDE 2; 30; 10 MG/5ML; MG/5ML; MG/5ML
5 SYRUP ORAL 4 TIMES DAILY PRN
Qty: 180 ML | Refills: 0 | Status: SHIPPED | OUTPATIENT
Start: 2024-11-13

## 2024-11-13 NOTE — TELEPHONE ENCOUNTER
Caller: Bushra Vaz    Relationship: Self    Best call back number:  [phone] (Home)     What medication are you requesting:  MEDICATION     What are your current symptoms:  COUGH, SORE THROAT, SPITTING GREEN MUCUS      How long have you been experiencing symptoms:  A COUPLE OF DAY     Have you had these symptoms before:    [x] Yes  [] No    Have you been treated for these symptoms before:   [x] Yes  [] No    If a prescription is needed, what is your preferred pharmacy and phone number:   [facility]     Additional notes:

## 2025-01-07 RX ORDER — AZITHROMYCIN 250 MG/1
TABLET, FILM COATED ORAL
Qty: 6 TABLET | Refills: 0 | Status: SHIPPED | OUTPATIENT
Start: 2025-01-07

## 2025-01-07 RX ORDER — BROMPHENIRAMINE MALEATE, PSEUDOEPHEDRINE HYDROCHLORIDE, AND DEXTROMETHORPHAN HYDROBROMIDE 2; 30; 10 MG/5ML; MG/5ML; MG/5ML
5 SYRUP ORAL 4 TIMES DAILY PRN
Qty: 180 ML | Refills: 0 | Status: SHIPPED | OUTPATIENT
Start: 2025-01-07

## 2025-04-25 ENCOUNTER — TRANSCRIBE ORDERS (OUTPATIENT)
Dept: ADMINISTRATIVE | Facility: HOSPITAL | Age: 80
End: 2025-04-25
Payer: MEDICARE

## 2025-04-25 DIAGNOSIS — Z12.31 ENCOUNTER FOR SCREENING MAMMOGRAM FOR MALIGNANT NEOPLASM OF BREAST: Primary | ICD-10-CM

## 2025-04-28 ENCOUNTER — OFFICE VISIT (OUTPATIENT)
Dept: OBSTETRICS AND GYNECOLOGY | Facility: CLINIC | Age: 80
End: 2025-04-28
Payer: MEDICARE

## 2025-04-28 VITALS
DIASTOLIC BLOOD PRESSURE: 70 MMHG | BODY MASS INDEX: 32.78 KG/M2 | RESPIRATION RATE: 16 BRPM | WEIGHT: 197 LBS | SYSTOLIC BLOOD PRESSURE: 126 MMHG

## 2025-04-28 DIAGNOSIS — N95.8 GENITOURINARY SYNDROME OF MENOPAUSE: Primary | ICD-10-CM

## 2025-04-28 PROCEDURE — 99459 PELVIC EXAMINATION: CPT

## 2025-04-28 PROCEDURE — 99213 OFFICE O/P EST LOW 20 MIN: CPT

## 2025-04-28 NOTE — PROGRESS NOTES
Subjective   Chief Complaint   Patient presents with    Follow-up     MEDICARE GYN     Bushra Vaz is a 79 y.o. year old  presenting to be seen in follow up regarding genitourinary syndrome of menopause. She is overall feeling well.     This past year she has not on hormone replacement therapy.  There has not been vaginal bleeding in the last 12 months.  Menopausal symptoms are not present.    SEXUAL Hx:  She is not currently sexually active.  In the past year there there has been NO new sexual partners.    Condoms are not needed because she is not sexually active.  She would not like to be screened for STD's at today's exam.  [place] is painful: not asked  HEALTH Hx:  She exercises regularly: no (and has no plans to become more active).   She wears her seat belt: yes.  She has concerns about domestic violence: not asked.  She has noticed changes in height: no  OTHER THINGS SHE WANTS TO DISCUSS TODAY:  Nothing else    The following portions of the patient's history were reviewed and updated as appropriate:problem list, current medications, allergies, past family history, past medical history, past social history, and past surgical history.    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     Osteoarthritis     Osteopenia 2020    FRAX risk 15% / 4.6%    Vitamin B12 deficiency     Vitamin D deficiency       Past Surgical History:   Procedure Laterality Date    APPENDECTOMY  2010    during hysterectomy    BREAST EXCISIONAL BIOPSY Right     CATARACT EXTRACTION, BILATERAL  2002    COLONOSCOPY  04/2018    COLONOSCOPY W/ POLYPECTOMY  2013    HIP TROCHANTERIC NAILING WITH INTRAMEDULLARY HIP SCREW Left 5/16/2022    Procedure: HIP TROCHANTERIC NAILING LEFT;  Surgeon: Satya Contreras MD;  Location: [facility];  Service: Orthopedics;  Laterality: Left;    REPLACEMENT TOTAL KNEE BILATERAL Bilateral 2012    TOTAL ABDOMINAL HYSTERECTOMY WITH SALPINGO OOPHORECTOMY Bilateral 2010    Stage IIIa adeno squamous carcinoma         Review of Systems   Constitutional: Negative.  Negative for appetite change and diaphoresis.   Respiratory: Negative.     Cardiovascular: Negative.    Gastrointestinal: Negative.  Negative for abdominal distention, blood in stool, GERD and indigestion.   Endocrine: Negative.    Genitourinary:  Positive for dysuria. Negative for breast discharge, breast lump, breast pain, hematuria and urinary incontinence.   Skin: Negative.           Objective   /70   Resp 16   Wt 89.4 kg (197 lb)   BMI 32.78 kg/m²     Physical Exam  Vitals and nursing note reviewed. Exam conducted with a chaperone present.   Constitutional:       Appearance: Normal appearance.   Cardiovascular:      Rate and Rhythm: Normal rate and regular rhythm.      Heart sounds: Normal heart sounds.   Pulmonary:      Effort: Pulmonary effort is normal.      Breath sounds: Normal breath sounds.   Abdominal:      Palpations: Abdomen is soft.   Genitourinary:     General: Normal vulva.      Rectum: Normal.   Neurological:      Mental Status: She is alert and oriented to person, place, and time.   Psychiatric:         Mood and Affect: Mood normal.         Behavior: Behavior normal.         Thought Content: Thought content normal.         Judgment: Judgment normal.             Assessment & Plan   Diagnoses and all orders for this visit:    1.  Genitourinary syndrome of menopause (Primary)    Other orders  -     Cancel: Urinalysis With Culture If Indicated - Urine, Clean Catch; Future    With discussion, she decided she did not want to do UA. She will let us know if symptoms worsen or persist.     She declines DEXA scan today. Discussed being intentional regarding calcium intake. She voiced understanding and appreciation.     The importance of keeping all planned follow-up and taking all medications as prescribed was emphasized.    Return in about 1 year (around 4/28/2026) for Annual physical.    No orders of the defined types were placed in this encounter.                This note was electronically signed.    Jessica Walker, APRN  April 28, 2025

## 2025-06-10 ENCOUNTER — TELEPHONE (OUTPATIENT)
Dept: FAMILY MEDICINE CLINIC | Facility: CLINIC | Age: 80
End: 2025-06-10

## 2025-06-10 NOTE — TELEPHONE ENCOUNTER
Caller: Bushra Vaz    Relationship to patient: Self    Best call back number: [phone]     Chief complaint: STINGING, BURNING SENSATION UNDER BOTH ARMPITS    Type of visit: OFFICE    Requested date: ASAP     Additional notes:PATIENT ONLY WANTS TO SEE PCP

## 2025-06-13 ENCOUNTER — OFFICE VISIT (OUTPATIENT)
Dept: FAMILY MEDICINE CLINIC | Facility: CLINIC | Age: 80
End: 2025-06-13
Payer: MEDICARE

## 2025-06-13 VITALS
SYSTOLIC BLOOD PRESSURE: 152 MMHG | HEART RATE: 82 BPM | DIASTOLIC BLOOD PRESSURE: 88 MMHG | OXYGEN SATURATION: 95 % | RESPIRATION RATE: 18 BRPM | BODY MASS INDEX: 30.66 KG/M2 | HEIGHT: 65 IN | TEMPERATURE: 98 F | WEIGHT: 184 LBS

## 2025-06-13 DIAGNOSIS — M79.2 NEURALGIA OF CHEST: Primary | ICD-10-CM

## 2025-06-13 PROCEDURE — 99213 OFFICE O/P EST LOW 20 MIN: CPT | Performed by: FAMILY MEDICINE

## 2025-06-13 PROCEDURE — 1126F AMNT PAIN NOTED NONE PRSNT: CPT | Performed by: FAMILY MEDICINE

## 2025-07-06 PROBLEM — M79.2 NEURALGIA OF CHEST: Status: ACTIVE | Noted: 2025-07-06

## 2025-07-06 NOTE — PROGRESS NOTES
Subjective   Bushra Vaz is a 79 y.o. female    Chief Complaint    Chest wall pain    Chest Pain   Pertinent negatives include no abdominal pain, back pain, cough, fever, nausea, palpitations or shortness of breath.     History of Present Illness  The patient is an 79-year-old female who presents today complaining of discomfort and burning type pain in both axilla areas and along the anterior bra line bilaterally. No rash present.  No prior history of shingles    She reports experiencing a stinging, burning sensation under both arms, with one side being more severe than the other. She does not have any associated rash. She has a mammogram scheduled for the end of July 2025 and has expressed concerns about potential lymphoma. She also mentions feeling mentally and physically exhausted.  She is recently  and admits to ongoing grief.      The following portions of the patient's history were reviewed and updated as appropriate: allergies, current medications, past social history and problem list    Review of Systems   Constitutional:  Negative for fatigue and fever.   HENT:  Negative for congestion and trouble swallowing.    Respiratory:  Negative for cough, choking, chest tightness and shortness of breath.    Cardiovascular:  Positive for chest pain. Negative for palpitations.   Gastrointestinal:  Negative for abdominal distention, abdominal pain and nausea.   Musculoskeletal:  Negative for back pain.   Neurological:  Negative for syncope.   Psychiatric/Behavioral:  Negative for dysphoric mood. The patient is not nervous/anxious.        Objective     Vitals:    06/13/25 1455   BP: 152/88   Pulse: 82   Resp: 18   Temp: 98 °F (36.7 °C)   SpO2: 95%       Physical Exam  Vitals and nursing note reviewed.   Constitutional:       Appearance: Normal appearance.   HENT:      Head: Normocephalic and atraumatic.   Eyes:      Conjunctiva/sclera: Conjunctivae normal.      Pupils: Pupils are equal, round, and reactive to  light.   Cardiovascular:      Rate and Rhythm: Normal rate and regular rhythm.   Pulmonary:      Effort: Pulmonary effort is normal. No respiratory distress.      Breath sounds: Normal breath sounds. No wheezing, rhonchi or rales.   Abdominal:      General: Bowel sounds are normal. There is no distension.      Palpations: Abdomen is soft.      Tenderness: There is no abdominal tenderness.   Musculoskeletal:      Cervical back: Neck supple.   Lymphadenopathy:      Cervical: No cervical adenopathy.   Skin:     General: Skin is warm and dry.      Findings: No erythema or rash.   Neurological:      Mental Status: She is alert and oriented to person, place, and time.      Sensory: No sensory deficit.      Motor: No weakness.   Psychiatric:         Mood and Affect: Mood is depressed. Affect is labile.     Physical Exam  Breast: No masses or tenderness noted in the axillae.    Assessment & Plan   Assessment & Plan  1. Bilateral axillary discomfort.  - Discomfort and burning pain in both axilla areas and along the bra line.  - No rash present; possible nerve irritation.  - Discussed the potential nerve involvement, possibly originating from the spinal region.  - Prescribed OTC topical medication for application on the affected areas.    Problems Addressed this Visit          Neuro    Neuralgia of chest - Primary     Diagnoses         Codes Comments      Neuralgia of chest    -  Primary ICD-10-CM: M79.2  ICD-9-CM: 353.8           Recommend trial of over-the-counter topical pain relief options.    I spent 20 minutes in patient care: Reviewing records prior to the visit, examining the patient, entering orders and documentation    Part of this note may be an electronic transcription/translation of spoken language to printed text using the Dragon Dictation System.

## 2025-07-23 ENCOUNTER — HOSPITAL ENCOUNTER (OUTPATIENT)
Dept: MAMMOGRAPHY | Facility: HOSPITAL | Age: 80
Discharge: HOME | End: 2025-07-23
Payer: MEDICARE

## 2025-07-23 DIAGNOSIS — Z12.31 ENCOUNTER FOR SCREENING MAMMOGRAM FOR MALIGNANT NEOPLASM OF BREAST: ICD-10-CM

## 2025-07-23 PROCEDURE — 77067 SCR MAMMO BI INCL CAD: CPT

## 2025-07-23 PROCEDURE — 77063 BREAST TOMOSYNTHESIS BI: CPT

## 2025-08-11 RX ORDER — CLOTRIMAZOLE AND BETAMETHASONE DIPROPIONATE 10; .64 MG/G; MG/G
1 CREAM TOPICAL 2 TIMES DAILY
Qty: 45 G | Refills: 2 | Status: SHIPPED | OUTPATIENT
Start: 2025-08-11

## (undated) DEVICE — GLV SURG SENSICARE PI ORTHO SZ8 LF STRL

## (undated) DEVICE — DRSNG WND BORDR/ADHS NONADHR/GZ LF 4X4IN STRL

## (undated) DEVICE — GLV SURG SENSICARE PI MIC PF SZ9 LF STRL

## (undated) DEVICE — DRILL, AO, STERILE

## (undated) DEVICE — DRSNG WND BORDR/ADHS NONADHR/GZ LF 4X10IN STRL

## (undated) DEVICE — UNDERGLV SURG BIOGEL INDICAT PI SZ8 BLU

## (undated) DEVICE — DRSNG GZ CURAD XEROFORM NONADHS 5X9IN STRL

## (undated) DEVICE — SPNG GZ WOVN 4X4IN 12PLY 10/BX STRL

## (undated) DEVICE — C-ARM: Brand: UNBRANDED

## (undated) DEVICE — DRSNG WND GZ PAD BORDERED 4X8IN STRL

## (undated) DEVICE — SUT ETHLN 3/0 FSLX 30IN 1673H

## (undated) DEVICE — DRSNG GZ PETROLTM XEROFORM CURAD 1X8IN STRL

## (undated) DEVICE — SPK10295 ORTHOPEDIC FRACTURE KIT: Brand: SPK10295 ORTHOPEDIC FRACTURE KIT

## (undated) DEVICE — ANTIBACTERIAL UNDYED BRAIDED (POLYGLACTIN 910), SYNTHETIC ABSORBABLE SUTURE: Brand: COATED VICRYL

## (undated) DEVICE — PK MAJ FX HIP 10

## (undated) DEVICE — LEGGINGS, PAIR, 29X43, STERILE: Brand: MEDLINE

## (undated) DEVICE — GUIDE WIRE, BALL-TIPPED, STERILE

## (undated) DEVICE — CVR HNDL LIGHT RIGID

## (undated) DEVICE — REAMER SHAFT, MOD.TRINKLE: Brand: BIXCUT